# Patient Record
Sex: FEMALE | Race: BLACK OR AFRICAN AMERICAN | Employment: FULL TIME | URBAN - METROPOLITAN AREA
[De-identification: names, ages, dates, MRNs, and addresses within clinical notes are randomized per-mention and may not be internally consistent; named-entity substitution may affect disease eponyms.]

---

## 2017-10-03 ENCOUNTER — OFFICE VISIT (OUTPATIENT)
Dept: NEUROLOGY | Age: 42
End: 2017-10-03

## 2017-10-03 VITALS
HEIGHT: 60 IN | WEIGHT: 132 LBS | SYSTOLIC BLOOD PRESSURE: 122 MMHG | DIASTOLIC BLOOD PRESSURE: 84 MMHG | OXYGEN SATURATION: 98 % | HEART RATE: 76 BPM | BODY MASS INDEX: 25.91 KG/M2

## 2017-10-03 DIAGNOSIS — R20.0 LEFT LEG NUMBNESS: ICD-10-CM

## 2017-10-03 DIAGNOSIS — G43.009 MIGRAINE WITHOUT AURA AND WITHOUT STATUS MIGRAINOSUS, NOT INTRACTABLE: ICD-10-CM

## 2017-10-03 DIAGNOSIS — I49.8 FLUTTERING HEART: ICD-10-CM

## 2017-10-03 DIAGNOSIS — R00.2 PALPITATIONS: ICD-10-CM

## 2017-10-03 DIAGNOSIS — R20.0 FACIAL NUMBNESS: ICD-10-CM

## 2017-10-03 DIAGNOSIS — G43.719 INTRACTABLE CHRONIC MIGRAINE WITHOUT AURA AND WITHOUT STATUS MIGRAINOSUS: Primary | ICD-10-CM

## 2017-10-03 RX ORDER — PROPRANOLOL HYDROCHLORIDE 60 MG/1
120 CAPSULE, EXTENDED RELEASE ORAL DAILY
Qty: 60 CAP | Refills: 11 | Status: SHIPPED | OUTPATIENT
Start: 2017-10-10 | End: 2018-10-03 | Stop reason: SDUPTHER

## 2017-10-03 NOTE — MR AVS SNAPSHOT
Visit Information Date & Time Provider Department Dept. Phone Encounter #  
 10/3/2017 11:40 AM Jarad Del Valle MD [facility] at [facility] [phone] [id number] Follow-up Instructions Return in about 1 year (around 10/3/2018). Upcoming Health Maintenance Date Due DTaP/Tdap/Td series (1 - Tdap) 3/5/1996 PAP AKA CERVICAL CYTOLOGY 3/5/1996 INFLUENZA AGE 9 TO ADULT 8/1/2017 Allergies as of 10/3/2017  Review Complete On: 10/3/2017 By: Jarad Del Valle MD  
 No Known Allergies Current Immunizations  Never Reviewed No immunizations on file. Not reviewed this visit You Were Diagnosed With   
  
 Codes Comments Intractable chronic migraine without aura and without status migrainosus    -  Primary ICD-10-CM: H77.898 ICD-9-CM: 346.71 Fluttering heart     ICD-10-CM: I49.8 ICD-9-CM: 427.42 Palpitations     ICD-10-CM: R00.2 ICD-9-CM: 785.1 Migraine without aura and without status migrainosus, not intractable     ICD-10-CM: X16.382 ICD-9-CM: 346.10 Facial numbness     ICD-10-CM: R20.0 ICD-9-CM: 782.0 Left leg numbness     ICD-10-CM: R20.0 ICD-9-CM: 101. 0 Vitals BP Pulse Height(growth percentile) Weight(growth percentile) SpO2 BMI  
 122/84 76 5' (1.524 m) 132 lb (59.9 kg) 98% 25.78 kg/m2 OB Status Smoking Status Having regular periods Never Smoker Vitals History BMI and BSA Data Body Mass Index Body Surface Area 25.78 kg/m 2 1.59 m 2 Preferred Pharmacy Pharmacy Name Phone [facility] [address] [place] [address] [phone] Your Updated Medication List  
  
   
This list is accurate as of: 10/3/17 12:59 PM.  Always use your most recent med list.  
  
  
  
  
 norgestrel-ethinyl estradiol 0.3-30 mg-mcg Tab Commonly known as:  LO/OVRAL Take 1 Tab by mouth daily. propranolol LA 60 mg SR capsule Commonly known as:  INDERAL LA Take 2 Caps by mouth daily. Start taking on:  10/10/2017 Prescriptions Sent to Pharmacy Refills  
 propranolol LA (INDERAL LA) 60 mg SR capsule 11 Starting on: 10/10/2017 Sig: Take 2 Caps by mouth daily. Class: Normal  
 Pharmacy: [address] #: [phone] Route: Oral  
  
Follow-up Instructions Return in about 1 year (around 10/3/2018). Patient Instructions PRESCRIPTION REFILL POLICY [facility] Statement to Patients April 1, 2014 In an effort to ensure the large volume of patient prescription refills is processed in the most efficient and expeditious manner, we are asking our patients to assist us by calling your Pharmacy for all prescription refills, this will include also your  Mail Order Pharmacy. The pharmacy will contact our office electronically to continue the refill process. Please do not wait until the last minute to call your pharmacy. We need at least 48 hours (2days) to fill prescriptions. We also encourage you to call your pharmacy before going to  your prescription to make sure it is ready. With regard to controlled substance prescription refill requests (narcotic refills) that need to be picked up at our office, we ask your cooperation by providing us with at least 72 hours (3days) notice that you will need a refill. We will not refill narcotic prescription refill requests after 4:00pm on any weekday, Monday through Thursday, or after 2:00pm on Fridays, or on the weekends. We encourage everyone to explore another way of getting your prescription refill request processed using orderTopia, our patient web portal through our electronic medical record system.  Riskifiedt is an efficient and effective way to communicate your medication request directly to the office and downloadable as an henry on your smart phone . Evri also features a review functionality that allows you to view your medication list as well as leave messages for your physician. Are you ready to get connected? If so please review the attatched instructions or speak to any of our staff to get you set up right away! Thank you so much for your cooperation. Should you have any questions please contact our Practice Administrator. The Physicians and Staff,  [facility] A Healthy Lifestyle: Care Instructions Your Care Instructions A healthy lifestyle can help you feel good, stay at a healthy weight, and have plenty of energy for both work and play. A healthy lifestyle is something you can share with your whole family. A healthy lifestyle also can lower your risk for serious health problems, such as high blood pressure, heart disease, and diabetes. You can follow a few steps listed below to improve your health and the health of your family. Follow-up care is a key part of your treatment and safety. Be sure to make and go to all appointments, and call your doctor if you are having problems. Its also a good idea to know your test results and keep a list of the medicines you take. How can you care for yourself at home? · Do not eat too much sugar, fat, or fast foods. You can still have dessert and treats now and then. The goal is moderation. · Start small to improve your eating habits. Pay attention to portion sizes, drink less juice and soda pop, and eat more fruits and vegetables. ¨ Eat a healthy amount of food. A 3-ounce serving of meat, for example, is about the size of a deck of cards. Fill the rest of your plate with vegetables and whole grains. ¨ Limit the amount of soda and sports drinks you have every day. Drink more water when you are thirsty. ¨ Eat at least 5 servings of fruits and vegetables every day.  It may seem like a lot, but it is not hard to reach this goal. A serving or helping is 1 piece of fruit, 1 cup of vegetables, or 2 cups of leafy, raw vegetables. Have an apple or some carrot sticks as an afternoon snack instead of a candy bar. Try to have fruits and/or vegetables at every meal. 
· Make exercise part of your daily routine. You may want to start with simple activities, such as walking, bicycling, or slow swimming. Try to be active 30 to 60 minutes every day. You do not need to do all 30 to 60 minutes all at once. For example, you can exercise 3 times a day for 10 or 20 minutes. Moderate exercise is safe for most people, but it is always a good idea to talk to your doctor before starting an exercise program. 
· Keep moving. Nikolay Jimthers the lawn, work in the garden, or [employer]. Take the stairs instead of the elevator at work. · If you smoke, quit. People who smoke have an increased risk for heart attack, stroke, cancer, and other lung illnesses. Quitting is hard, but there are ways to boost your chance of quitting tobacco for good. ¨ Use nicotine gum, patches, or lozenges. ¨ Ask your doctor about stop-smoking programs and medicines. ¨ Keep trying. In addition to reducing your risk of diseases in the future, you will notice some benefits soon after you stop using tobacco. If you have shortness of breath or asthma symptoms, they will likely get better within a few weeks after you quit. · Limit how much alcohol you drink. Moderate amounts of alcohol (up to 2 drinks a day for men, 1 drink a day for women) are okay. But drinking too much can lead to liver problems, high blood pressure, and other health problems. Family health If you have a family, there are many things you can do together to improve your health. · Eat meals together as a family as often as possible. · Eat healthy foods. This includes fruits, vegetables, lean meats and dairy, and whole grains. · Include your family in your fitness plan. Most people think of activities such as jogging or tennis as the way to fitness, but there are many ways you and your family can be more active. Anything that makes you breathe hard and gets your heart pumping is exercise. Here are some tips: 
¨ Walk to do errands or to take your child to school or the bus. ¨ Go for a family bike ride after dinner instead of watching TV. Where can you learn more? Go to http://keily-tyson.info/. Enter G932 in the search box to learn more about \"A Healthy Lifestyle: Care Instructions. \" Current as of: July 26, 2016 Content Version: 11.3 © 5852-0048 [employer]. Care instructions adapted under license by [employer]Home (which disclaims liability or warranty for this information). If you have questions about a medical condition or this instruction, always ask your healthcare professional. [address] any warranty or liability for your use of this information. Introducing [facility] & HEALTH SERVICES! Araceli Jesus introduces WinBuyer patient portal. Now you can access parts of your medical record, email your doctor's office, and request medication refills online. 1. In your internet browser, go to [email or web address]/Osprey Spill Control 2. Click on the First Time User? Click Here link in the Sign In box. You will see the New Member Sign Up page. 3. Enter your WinBuyer Access Code exactly as it appears below. You will not need to use this code after youve completed the sign-up process. If you do not sign up before the expiration date, you must request a new code. · WinBuyer Access Code: [email or web address] Expires: 12/6/2017  9:47 AM 
 
4. Enter the last four digits of your Social Security Number (xxxx) and Date of Birth (mm/dd/yyyy) as indicated and click Submit. You will be taken to the next sign-up page. 5. Create a SafeBoot ID. This will be your SafeBoot login ID and cannot be changed, so think of one that is secure and easy to remember. 6. Create a SafeBoot password. You can change your password at any time. 7. Enter your Password Reset Question and Answer. This can be used at a later time if you forget your password. 8. Enter your e-mail address. You will receive e-mail notification when new information is available in [address] 9. Click Sign Up. You can now view and download portions of your medical record. 10. Click the Download Summary menu link to download a portable copy of your medical information. If you have questions, please visit the Frequently Asked Questions section of the SafeBoot website. Remember, SafeBoot is NOT to be used for urgent needs. For medical emergencies, dial 911. Now available from your iPhone and Android! Please provide this summary of care documentation to your next provider. Your primary care clinician is listed as POLLO Tavarez. If you have any questions after today's visit, please call [phone].

## 2017-10-03 NOTE — PATIENT INSTRUCTIONS
[facility]   Statement to Patients  April 1, 2014      In an effort to ensure the large volume of patient prescription refills is processed in the most efficient and expeditious manner, we are asking our patients to assist us by calling your Pharmacy for all prescription refills, this will include also your  Mail Order Pharmacy. The pharmacy will contact our office electronically to continue the refill process. Please do not wait until the last minute to call your pharmacy. We need at least 48 hours (2days) to fill prescriptions. We also encourage you to call your pharmacy before going to  your prescription to make sure it is ready. With regard to controlled substance prescription refill requests (narcotic refills) that need to be picked up at our office, we ask your cooperation by providing us with at least 72 hours (3days) notice that you will need a refill. We will not refill narcotic prescription refill requests after 4:00pm on any weekday, Monday through Thursday, or after 2:00pm on Fridays, or on the weekends. We encourage everyone to explore another way of getting your prescription refill request processed using [employer], our patient web portal through our electronic medical record system. [employer] is an efficient and effective way to communicate your medication request directly to the office and  downloadable as an henry on your smart phone . [employer] also features a review functionality that allows you to view your medication list as well as leave messages for your physician. Are you ready to get connected? If so please review the attatched instructions or speak to any of our staff to get you set up right away! Thank you so much for your cooperation. Should you have any questions please contact our Practice Administrator.     The Physicians and Staff,  [facility]          A Healthy Lifestyle: Care Instructions  Your Care Instructions  A healthy lifestyle can help you feel good, stay at a healthy weight, and have plenty of energy for both work and play. A healthy lifestyle is something you can share with your whole family. A healthy lifestyle also can lower your risk for serious health problems, such as high blood pressure, heart disease, and diabetes. You can follow a few steps listed below to improve your health and the health of your family. Follow-up care is a key part of your treatment and safety. Be sure to make and go to all appointments, and call your doctor if you are having problems. Its also a good idea to know your test results and keep a list of the medicines you take. How can you care for yourself at home? · Do not eat too much sugar, fat, or fast foods. You can still have dessert and treats now and then. The goal is moderation. · Start small to improve your eating habits. Pay attention to portion sizes, drink less juice and soda pop, and eat more fruits and vegetables. ¨ Eat a healthy amount of food. A 3-ounce serving of meat, for example, is about the size of a deck of cards. Fill the rest of your plate with vegetables and whole grains. ¨ Limit the amount of soda and sports drinks you have every day. Drink more water when you are thirsty. ¨ Eat at least 5 servings of fruits and vegetables every day. It may seem like a lot, but it is not hard to reach this goal. A serving or helping is 1 piece of fruit, 1 cup of vegetables, or 2 cups of leafy, raw vegetables. Have an apple or some carrot sticks as an afternoon snack instead of a candy bar. Try to have fruits and/or vegetables at every meal.  · Make exercise part of your daily routine. You may want to start with simple activities, such as walking, bicycling, or slow swimming. Try to be active 30 to 60 minutes every day. You do not need to do all 30 to 60 minutes all at once. For example, you can exercise 3 times a day for 10 or 20 minutes.  Moderate exercise is safe for most people, but it is always a good idea to talk to your doctor before starting an exercise program.  · Keep moving. Pinky Boys the lawn, work in the garden, or "[employer]. Take the stairs instead of the elevator at work. · If you smoke, quit. People who smoke have an increased risk for heart attack, stroke, cancer, and other lung illnesses. Quitting is hard, but there are ways to boost your chance of quitting tobacco for good. ¨ Use nicotine gum, patches, or lozenges. ¨ Ask your doctor about stop-smoking programs and medicines. ¨ Keep trying. In addition to reducing your risk of diseases in the future, you will notice some benefits soon after you stop using tobacco. If you have shortness of breath or asthma symptoms, they will likely get better within a few weeks after you quit. · Limit how much alcohol you drink. Moderate amounts of alcohol (up to 2 drinks a day for men, 1 drink a day for women) are okay. But drinking too much can lead to liver problems, high blood pressure, and other health problems. Family health  If you have a family, there are many things you can do together to improve your health. · Eat meals together as a family as often as possible. · Eat healthy foods. This includes fruits, vegetables, lean meats and dairy, and whole grains. · Include your family in your fitness plan. Most people think of activities such as jogging or tennis as the way to fitness, but there are many ways you and your family can be more active. Anything that makes you breathe hard and gets your heart pumping is exercise. Here are some tips:  ¨ Walk to do errands or to take your child to school or the bus. ¨ Go for a family bike ride after dinner instead of watching TV. Where can you learn more? Go to http://keily-tyson.info/. Enter J239 in the search box to learn more about \"A Healthy Lifestyle: Care Instructions. \"  Current as of: July 26, 2016  Content Version: 11.3  © 4902-4130 [employer]. Care instructions adapted under license by [employer] (which disclaims liability or warranty for this information). If you have questions about a medical condition or this instruction, always ask your healthcare professional. [address] any warranty or liability for your use of this information.

## 2017-10-03 NOTE — PROGRESS NOTES
Name: Ramone Feng    Chief Complaint: migraines    Mrs Alfred Oneal returns for a follow up visit. Since last being seen she  has been compliant with medications. No new health conditions have arisen. No new medications have been  prescribed. She  has no new symptoms to address today. Migraines are under good control. Assesment and Plan    1. Intractable chronic migraine without aura and without status migrainosus  proponalol    2. Fluttering heart  Advised to remain vigilant      Allergies  Review of patient's allergies indicates no known allergies. Medications  Current Outpatient Prescriptions   Medication Sig    propranolol LA (INDERAL LA) 60 mg SR capsule Take 2 Caps by mouth daily.  norgestrel-ethinyl estradiol (LO/OVRAL) 0.3-30 mg-mcg tab Take 1 Tab by mouth daily.  atenolol (TENORMIN) 50 mg tablet Take  by mouth daily. No current facility-administered medications for this visit. Medical History  Past Medical History:   Diagnosis Date    Headache      Review of Systems   Constitutional: Negative for chills and fever. HENT: Negative for ear pain. Eyes: Negative for pain and discharge. Respiratory: Negative for cough and hemoptysis. Cardiovascular: Negative for chest pain and claudication. Gastrointestinal: Negative for constipation and diarrhea. Genitourinary: Negative for flank pain and hematuria. Musculoskeletal: Negative for back pain and myalgias. Skin: Negative for itching and rash. Neurological: Negative for headaches. Endo/Heme/Allergies: Negative for environmental allergies. Does not bruise/bleed easily. Psychiatric/Behavioral: Negative for depression and hallucinations. Exam:    Visit Vitals    /84    Pulse 76    Ht 5' (1.524 m)    Wt 132 lb (59.9 kg)    SpO2 98%    BMI 25.78 kg/m2      General: Well developed, well nourished.  Patient in no apparent distress   Head: Normocephalic, atraumatic, anicteric sclera   Neck Normal ROM, No thyromegally   Lungs:  Clear to auscultation bilaterally, No wheezes or rubs   Cardiac: Regular rate and rhythm with no murmurs. Abd: Bowel sounds were audible. No tenderness on palpation   Ext: No pedal edema   Skin: Supple no rash     NeurologicExam:  Mental Status: Alert and oriented to person place and time   Speech: Fluent no aphasia or dysarthria. Cranial Nerves:  II - XII Inact   Motor:  Full and symmetric strength of upper and lower proximal and distal muscles. Normal bulk and tone. Reflexes:   Deep tendon reflexes 2+/4 and symmetric. Sensory:   Symmetric and intact with no perceived deficits modalities involving small or large fibers. Gait:  Gait is balanced and fluid with normal arm swing. Tremor:   No tremor noted. Cerebellar:  Coordination intact. Neurovascular: No carotid bruits.  No JVD

## 2018-02-14 ENCOUNTER — TELEPHONE (OUTPATIENT)
Dept: SURGERY | Age: 43
End: 2018-02-14

## 2018-02-14 ENCOUNTER — DOCUMENTATION ONLY (OUTPATIENT)
Dept: SURGERY | Age: 43
End: 2018-02-14

## 2018-02-14 ENCOUNTER — OFFICE VISIT (OUTPATIENT)
Dept: SURGERY | Age: 43
End: 2018-02-14

## 2018-02-14 VITALS
BODY MASS INDEX: 23.56 KG/M2 | SYSTOLIC BLOOD PRESSURE: 140 MMHG | HEIGHT: 60 IN | WEIGHT: 120 LBS | HEART RATE: 84 BPM | DIASTOLIC BLOOD PRESSURE: 77 MMHG

## 2018-02-14 DIAGNOSIS — Z17.0 MALIGNANT NEOPLASM OF UPPER-OUTER QUADRANT OF RIGHT BREAST IN FEMALE, ESTROGEN RECEPTOR POSITIVE (HCC): Primary | ICD-10-CM

## 2018-02-14 DIAGNOSIS — C50.411 MALIGNANT NEOPLASM OF UPPER-OUTER QUADRANT OF RIGHT BREAST IN FEMALE, ESTROGEN RECEPTOR POSITIVE (HCC): Primary | ICD-10-CM

## 2018-02-14 NOTE — PROGRESS NOTES
HISTORY OF PRESENT ILLNESS  America Zambrano is a 43 y.o. female. HPI NEW Patient presents for consultation at the request of Dr. Joslyn Plaza for newly diagnosed RIGHT breast cancer. The patient was able to feel a lump which led to diagnostic mammogram, ultrasound, and biopsy. This was the first time she had a breast biopsy done. Has minimal pain at biopsy site. FH: Maternal cousin diagnosed with breast cancer in her 66's. Screening mammogram in 11/2017: BI-RADS 1. Diagnostic pablo and US done 2/1/18 at [facility]: BI-RADS 4B. 1.2 cm hypoechoic mass 10:00 upper outer quadrant. No abnormal nodes. RIGHT breast biopsy done 2/6/18. Pathology revealed RIGHT breast IDC, grade 2, ER positive 80%, MD positive 5%, HER 2 equivocal on ihc    Past Medical History:   Diagnosis Date    Gestational diabetes     Headache     Palpitations     Prediabetes      Past Surgical History:   Procedure Laterality Date    HX GYN       Social History     Social History    Marital status:      Spouse name: N/A    Number of children: N/A    Years of education: N/A     Occupational History    Not on file. Social History Main Topics    Smoking status: Never Smoker    Smokeless tobacco: Never Used    Alcohol use No    Drug use: No    Sexual activity: Not on file     Other Topics Concern    Not on file     Social History Narrative     OB History     Obstetric Comments    Menarche:  15. LMP: 2/8/18. # of Children:  1. Age at Delivery of First Child:  25.   Hysterectomy/oophorectomy:  NO/NO. Breast Bx:  yes. Hx of Breast Feeding:  no. BCP:  yes. Hormone therapy:  no.               Current Outpatient Prescriptions:     propranolol LA (INDERAL LA) 60 mg SR capsule, Take 2 Caps by mouth daily. , Disp: 60 Cap, Rfl: 11    norgestrel-ethinyl estradiol (LO/OVRAL) 0.3-30 mg-mcg tab, Take 1 Tab by mouth daily. , Disp: , Rfl:   No Known Allergies      Review of Systems   Constitutional: Negative.          Heavy periods  Frequent yeast infection  Bleeding between periods  Irregular periods  Breast pain with periods   HENT: Negative. Eyes: Negative. Respiratory: Negative. Cardiovascular: Positive for palpitations. Gastrointestinal: Negative. Genitourinary: Negative. Skin: Negative. Neurological: Negative. Endo/Heme/Allergies: Negative. Psychiatric/Behavioral: Negative. Physical Exam   Constitutional: She is oriented to person, place, and time. She appears well-developed and well-nourished. HENT:   Head: Normocephalic. Eyes: EOM are normal.   Neck: Neck supple. No thyromegaly present. Cardiovascular: Normal rate, regular rhythm, normal heart sounds and intact distal pulses. Pulmonary/Chest: Effort normal and breath sounds normal. Right breast exhibits mass. Right breast exhibits no nipple discharge, no skin change and no tenderness. Inverted nipple: 1.2 cm mass tail right breast. Left breast exhibits no inverted nipple, no mass, no nipple discharge, no skin change and no tenderness. Breasts are symmetrical.       Abdominal: Soft. Musculoskeletal: Normal range of motion. Lymphadenopathy:     She has no cervical adenopathy. She has no axillary adenopathy. Neurological: She is alert and oriented to person, place, and time. Skin: Skin is warm and dry. Psychiatric: She has a normal mood and affect. Nursing note and vitals reviewed. ASSESSMENT and PLAN    ICD-10-CM ICD-9-CM    1. Malignant neoplasm of upper-outer quadrant of right breast in female, estrogen receptor positive [facility] C50.411 174.4     Z17.0 V86.0      42 yo female here today for second opinion. She has a T1 N0 IDC Grade 2 Er/Pr+ Her 2 foreign eq right breast cancer. She is here with her . I have reviewed the imaging and pathology with her and she was given copies of these reports.     90 minutes were spent face-to-face with the patient during this encounter and 90% of that time was spent on counseling and coordination of care. 1. Discussed lumpectomy and radiation vs mastectomy. Discussed reconstruction. MRI ordered to see if patient is a candidate for a lumpectomy. The was ordered already at [address] [place] [address]. 2. Discussed sentinel lymph node biopsy. 3. Discussed external beam radiation. 4. Discussed hormone therapy. 5. Discussed the possibility of chemotherapy. Her 2 foreign fish pending. 6. Discussed genetic testing. Had gene panel sent. She will let me know who she wants to do her breast surgery. Gene panel , mri pending. She has appt with Dr. Manjinder Baez to discuss reconstruction since the patient wants bilateral mastectomies. I think this is reasonable. She will let us know when mri is scheduled so we can f/u on that. She was happy with our discussion.

## 2018-02-14 NOTE — PATIENT INSTRUCTIONS
Breast Cancer: Care Instructions  Your Care Instructions    Breast cancer occurs when abnormal cells grow out of control in the breast. These cancer cells can spread within the breast, to nearby lymph nodes and other tissues, and to other parts of the body. Being treated for cancer can weaken your body, and you may feel very tired. Get the rest your body needs so you can feel better. Finding out that you have cancer is scary. You may feel many emotions and may need some help coping. Seek out family, friends, and counselors for support. You also can do things at home to make yourself feel better while you go through treatment. Call the [employer] (9-775.163.8517) or visit its website at "[employer] for more information. Follow-up care is a key part of your treatment and safety. Be sure to make and go to all appointments, and call your doctor if you are having problems. It's also a good idea to know your test results and keep a list of the medicines you take. How can you care for yourself at home? · Take your medicines exactly as prescribed. Call your doctor if you think you are having a problem with your medicine. You may get medicine for nausea and vomiting if you have these side effects. · Follow your doctor's instructions to relieve pain. Pain from cancer and surgery can almost always be controlled. Use pain medicine when you first notice pain, before it becomes severe. · Eat healthy food. If you do not feel like eating, try to eat food that has protein and extra calories to keep up your strength and prevent weight loss. Drink liquid meal replacements for extra calories and protein. Try to eat your main meal early. · Get some physical activity every day, but do not get too tired. Keep doing the hobbies you enjoy as your energy allows. · Do not smoke. Smoking can make your cancer worse. If you need help quitting, talk to your doctor about stop-smoking programs and medicines.  These can increase your chances of quitting for good. · Take steps to control your stress and workload. Learn relaxation techniques. ¨ Share your feelings. Stress and tension affect our emotions. By expressing your feelings to others, you may be able to understand and cope with them. ¨ Consider joining a support group. Talking about a problem with your spouse, a good friend, or other people with similar problems is a good way to reduce tension and stress. ¨ Express yourself through art. Try writing, crafts, dance, or art to relieve stress. Some dance, writing, or art groups may be available just for people who have cancer. ¨ Be kind to your body and mind. Getting enough sleep, eating a healthy diet, and taking time to do things you enjoy can contribute to an overall feeling of balance in your life and can help reduce stress. ¨ Get help if you need it. Discuss your concerns with your doctor or counselor. · If you are vomiting or have diarrhea:  ¨ Drink plenty of fluids (enough so that your urine is light yellow or clear like water) to prevent dehydration. Choose water and other caffeine-free clear liquids. If you have kidney, heart, or liver disease and have to limit fluids, talk with your doctor before you increase the amount of fluids you drink. ¨ When you are able to eat, try clear soups, mild foods, and liquids until all symptoms are gone for 12 to 48 hours. Other good choices include dry toast, crackers, cooked cereal, and gelatin dessert, such as Jell-O.  · If you have not already done so, prepare a list of advance directives. Advance directives are instructions to your doctor and family members about what kind of care you want if you become unable to speak or express yourself. When should you call for help? Call 911 anytime you think you may need emergency care. For example, call if:  ? · You passed out (lost consciousness). ?Call your doctor now or seek immediate medical care if:  ? · You have a fever. ? · You have abnormal bleeding. ? · You think you have an infection. ? · You have new or worse pain. ? · You have new symptoms, such as a cough, belly pain, vomiting, diarrhea, or a rash. ? Watch closely for changes in your health, and be sure to contact your doctor if:  ? · You are much more tired than usual.   ? · You have swollen glands in your armpits, groin, or neck. ? · You do not get better as expected. Where can you learn more? Go to http://keily-tyson.info/. Enter V321 in the search box to learn more about \"Breast Cancer: Care Instructions. \"  Current as of: May 12, 2017  Content Version: 11.4  © 6243-6774 [employer]. Care instructions adapted under license by [employer] (which disclaims liability or warranty for this information). If you have questions about a medical condition or this instruction, always ask your healthcare professional. [address] any warranty or liability for your use of this information.

## 2018-02-14 NOTE — LETTER
2/14/2018 1:09 PM 
 
Patient: Kary Curran YOB: 1975 Date of Visit: 2/14/2018 Dear Rashad Littlejohn MD 
[facility] [address] [id number] [facility] [phone] [id number] VIA In Basket 
 : Thank you for referring Ms. Tatiana Fay to me for evaluation/treatment. Below are the relevant portions of my assessment and plan of care. HISTORY OF PRESENT ILLNESS Kary Curran is a 43 y.o. female. HPI NEW Patient presents for consultation at the request of Dr. Acosta Prarish for newly diagnosed RIGHT breast cancer. The patient was able to feel a lump which led to diagnostic mammogram, ultrasound, and biopsy. This was the first time she had a breast biopsy done. Has minimal pain at biopsy site. FH: Maternal cousin diagnosed with breast cancer in her 66's. Screening mammogram in 11/2017: BI-RADS 1. Diagnostic pablo and US done 2/1/18 at [facility]: BI-RADS 4B. 1.2 cm hypoechoic mass 10:00 upper outer quadrant. No abnormal nodes. RIGHT breast biopsy done 2/6/18. Pathology revealed RIGHT breast IDC, grade 2, ER positive 80%, CO positive 5%, HER 2 equivocal on ihc 
 
Past Medical History:  
Diagnosis Date  Gestational diabetes  Headache  Palpitations  Prediabetes Past Surgical History:  
Procedure Laterality Date  HX GYN Social History Social History  Marital status:  Spouse name: N/A  
 Number of children: N/A  
 Years of education: N/A Occupational History  Not on file. Social History Main Topics  Smoking status: Never Smoker  Smokeless tobacco: Never Used  Alcohol use No  
 Drug use: No  
 Sexual activity: Not on file Other Topics Concern  Not on file Social History Narrative OB History Obstetric Comments Menarche:  15. LMP: 2/8/18. # of Children:  1. Age at Delivery of First Child:  25.   Hysterectomy/oophorectomy:  NO/NO. Breast Bx:  yes. Hx of Breast Feeding:  no. BCP:  yes. Hormone therapy:  no.  
 
  
  
 
 
Current Outpatient Prescriptions:  
  propranolol LA (INDERAL LA) 60 mg SR capsule, Take 2 Caps by mouth daily. , Disp: 60 Cap, Rfl: 11 
  norgestrel-ethinyl estradiol (LO/OVRAL) 0.3-30 mg-mcg tab, Take 1 Tab by mouth daily. , Disp: , Rfl:  
No Known Allergies Review of Systems Constitutional: Negative. Heavy periods Frequent yeast infection Bleeding between periods Irregular periods Breast pain with periods HENT: Negative. Eyes: Negative. Respiratory: Negative. Cardiovascular: Positive for palpitations. Gastrointestinal: Negative. Genitourinary: Negative. Skin: Negative. Neurological: Negative. Endo/Heme/Allergies: Negative. Psychiatric/Behavioral: Negative. Physical Exam  
Constitutional: She is oriented to person, place, and time. She appears well-developed and well-nourished. HENT:  
Head: Normocephalic. Eyes: EOM are normal.  
Neck: Neck supple. No thyromegaly present. Cardiovascular: Normal rate, regular rhythm, normal heart sounds and intact distal pulses. Pulmonary/Chest: Effort normal and breath sounds normal. Right breast exhibits mass. Right breast exhibits no nipple discharge, no skin change and no tenderness. Inverted nipple: 1.2 cm mass tail right breast. Left breast exhibits no inverted nipple, no mass, no nipple discharge, no skin change and no tenderness. Breasts are symmetrical.  
 
 
Abdominal: Soft. Musculoskeletal: Normal range of motion. Lymphadenopathy:  
  She has no cervical adenopathy. She has no axillary adenopathy. Neurological: She is alert and oriented to person, place, and time. Skin: Skin is warm and dry. Psychiatric: She has a normal mood and affect. Nursing note and vitals reviewed. ASSESSMENT and PLAN 
  ICD-10-CM ICD-9-CM 1. Malignant neoplasm of upper-outer quadrant of right breast in female, estrogen receptor positive (HCC) C50.411 174.4   
 Z17.0 V86.0   
 
44 yo female here today for second opinion. She has a T1 N0 IDC Grade 2 Er/Pr+ Her 2 foreign eq right breast cancer. She is here with her . I have reviewed the imaging and pathology with her and she was given copies of these reports. 90 minutes were spent face-to-face with the patient during this encounter and 90% of that time was spent on counseling and coordination of care. 1. Discussed lumpectomy and radiation vs mastectomy. Discussed reconstruction. MRI ordered to see if patient is a candidate for a lumpectomy. The was ordered already at [address] [place] [address]. 2. Discussed sentinel lymph node biopsy. 3. Discussed external beam radiation. 4. Discussed hormone therapy. 5. Discussed the possibility of chemotherapy. Her 2 foreign fish pending. 6. Discussed genetic testing. Had gene panel sent. She will let me know who she wants to do her breast surgery. Gene panel , mri pending. She has appt with Dr. Raquel Borja to discuss reconstruction since the patient wants bilateral mastectomies. I think this is reasonable. She will let us know when mri is scheduled so we can f/u on that. She was happy with our discussion. If you have questions, please do not hesitate to call me. I look forward to following Ms. Leona Best along with you. Sincerely, Cookie Morris MD

## 2018-02-14 NOTE — PROGRESS NOTES
Type of Film: [x] CD [] FILMS  Type of Test: [] MRI [x] MAMMO  From: [facility]  Given to: Dr. Aida Renner will bring CDs to [facility].   LOCATION  To be Downloaded into PACS:  YES

## 2018-02-14 NOTE — COMMUNICATION BODY
HISTORY OF PRESENT ILLNESS  America Lehman is a 43 y.o. female. HPI NEW Patient presents for consultation at the request of Dr. Glo Tsai for newly diagnosed RIGHT breast cancer. The patient was able to feel a lump which led to diagnostic mammogram, ultrasound, and biopsy. This was the first time she had a breast biopsy done. Has minimal pain at biopsy site. FH: Maternal cousin diagnosed with breast cancer in her 66's. Screening mammogram in 11/2017: BI-RADS 1. Diagnostic pablo and US done 2/1/18 at [address]: BI-RADS 4B. 1.2 cm hypoechoic mass 10:00 upper outer quadrant. No abnormal nodes. RIGHT breast biopsy done 2/6/18. Pathology revealed RIGHT breast IDC, grade 2, ER positive 80%, MI positive 5%, HER 2 equivocal on ihc    Past Medical History:   Diagnosis Date    Gestational diabetes     Headache     Palpitations     Prediabetes      Past Surgical History:   Procedure Laterality Date    HX GYN       Social History     Social History    Marital status:      Spouse name: N/A    Number of children: N/A    Years of education: N/A     Occupational History    Not on file. Social History Main Topics    Smoking status: Never Smoker    Smokeless tobacco: Never Used    Alcohol use No    Drug use: No    Sexual activity: Not on file     Other Topics Concern    Not on file     Social History Narrative     OB History     Obstetric Comments    Menarche:  15. LMP: 2/8/18. # of Children:  1. Age at Delivery of First Child:  25.   Hysterectomy/oophorectomy:  NO/NO. Breast Bx:  yes. Hx of Breast Feeding:  no. BCP:  yes. Hormone therapy:  no.               Current Outpatient Prescriptions:     propranolol LA (INDERAL LA) 60 mg SR capsule, Take 2 Caps by mouth daily. , Disp: 60 Cap, Rfl: 11    norgestrel-ethinyl estradiol (LO/OVRAL) 0.3-30 mg-mcg tab, Take 1 Tab by mouth daily. , Disp: , Rfl:   No Known Allergies      Review of Systems   Constitutional: Negative.          Heavy periods  Frequent yeast infection  Bleeding between periods  Irregular periods  Breast pain with periods   HENT: Negative. Eyes: Negative. Respiratory: Negative. Cardiovascular: Positive for palpitations. Gastrointestinal: Negative. Genitourinary: Negative. Skin: Negative. Neurological: Negative. Endo/Heme/Allergies: Negative. Psychiatric/Behavioral: Negative. Physical Exam   Constitutional: She is oriented to person, place, and time. She appears well-developed and well-nourished. HENT:   Head: Normocephalic. Eyes: EOM are normal.   Neck: Neck supple. No thyromegaly present. Cardiovascular: Normal rate, regular rhythm, normal heart sounds and intact distal pulses. Pulmonary/Chest: Effort normal and breath sounds normal. Right breast exhibits mass. Right breast exhibits no nipple discharge, no skin change and no tenderness. Inverted nipple: 1.2 cm mass tail right breast. Left breast exhibits no inverted nipple, no mass, no nipple discharge, no skin change and no tenderness. Breasts are symmetrical.       Abdominal: Soft. Musculoskeletal: Normal range of motion. Lymphadenopathy:     She has no cervical adenopathy. She has no axillary adenopathy. Neurological: She is alert and oriented to person, place, and time. Skin: Skin is warm and dry. Psychiatric: She has a normal mood and affect. Nursing note and vitals reviewed. ASSESSMENT and PLAN    ICD-10-CM ICD-9-CM    1. Malignant neoplasm of upper-outer quadrant of right breast in female, estrogen receptor positive [facility] C50.411 174.4     Z17.0 V86.0      42 yo female here today for second opinion. She has a T1 N0 IDC Grade 2 Er/Pr+ Her 2 foreign eq right breast cancer. She is here with her . I have reviewed the imaging and pathology with her and she was given copies of these reports.     90 minutes were spent face-to-face with the patient during this encounter and 90% of that time was spent on counseling and coordination of care. 1. Discussed lumpectomy and radiation vs mastectomy. Discussed reconstruction. MRI ordered to see if patient is a candidate for a lumpectomy. The was ordered already at [address] [place] [address]. 2. Discussed sentinel lymph node biopsy. 3. Discussed external beam radiation. 4. Discussed hormone therapy. 5. Discussed the possibility of chemotherapy. Her 2 foregin fish pending. 6. Discussed genetic testing. Had gene panel sent. She will let me know who she wants to do her breast surgery. Gene panel , mri pending. She has appt with Dr. Olga Groves to discuss reconstruction since the patient wants bilateral mastectomies. I think this is reasonable. She will let us know when mri is scheduled so we can f/u on that. She was happy with our discussion.

## 2018-02-14 NOTE — TELEPHONE ENCOUNTER
Patient wants to know fax # for fmla ppw. I called back and provided fax # [phone] Attn: Ej Romero.

## 2018-02-16 ENCOUNTER — TELEPHONE (OUTPATIENT)
Dept: SURGERY | Age: 43
End: 2018-02-16

## 2018-02-16 NOTE — TELEPHONE ENCOUNTER
Called patient after she left a message asking Amanda Gardner to call her. Gave her the fax # for [facility] so she can send her her LA ppw. She would like Hoskins to call her back and let her know she received the ppw. She wanted to let Dr. Palak Chamberlain and Amanda Gardner know when she is having her MRI and appointment with Dr. Dee Corbett. The MRI is at [facility] office on 2/26/18 at 7:30 am. Her appointment with Dr. Dee Corbett is the same day 2/26/18, at 10am.      She was appreciative of return phone call.

## 2018-02-19 ENCOUNTER — DOCUMENTATION ONLY (OUTPATIENT)
Dept: SURGERY | Age: 43
End: 2018-02-19

## 2018-02-19 NOTE — PROGRESS NOTES
[facility] have been uploaded into pacs. These cds will be placed into the shredder.

## 2018-02-26 DIAGNOSIS — C50.411 MALIGNANT NEOPLASM OF UPPER-OUTER QUADRANT OF RIGHT BREAST IN FEMALE, ESTROGEN RECEPTOR POSITIVE (HCC): Primary | ICD-10-CM

## 2018-02-26 DIAGNOSIS — Z17.0 MALIGNANT NEOPLASM OF UPPER-OUTER QUADRANT OF RIGHT BREAST IN FEMALE, ESTROGEN RECEPTOR POSITIVE (HCC): Primary | ICD-10-CM

## 2018-02-27 ENCOUNTER — DOCUMENTATION ONLY (OUTPATIENT)
Dept: SURGERY | Age: 43
End: 2018-02-27

## 2018-02-27 ENCOUNTER — TELEPHONE (OUTPATIENT)
Dept: SURGERY | Age: 43
End: 2018-02-27

## 2018-02-27 NOTE — TELEPHONE ENCOUNTER
Patient called wanting to know update and plan. I called patient back. She had breast MRI yesterday (2/26/18) at [facility]. I called [facility] and report has not been signed out yet. They requested films/cd from outside imaging for comparison and states this usually take a week to complete. She saw Dr. Aissatou Sorensen yesterday (2/26/18) and they discussed having bilateral mastectomy with implants. She states she wants to have surgery with Dr. Hubert Ramirez and Dr. Aissatou Sorensen. She does not want to go with Dr. Becky Goodson. She has not heard any update from Dr. Daryle Brunet office in regards to genetic testing results. I told her I would route this to Dr. Hubert Ramirez and I would let her know she was asking for an update. Answered many questions to the best of my ability.

## 2018-02-27 NOTE — TELEPHONE ENCOUNTER
Patient called and left a message asking for Samanta Stallworth and any updates. I called patient to see how I could help but there was no answer. I left a message asking her to call us back.

## 2018-02-27 NOTE — PROGRESS NOTES
Patient's  left voicemail for me. I called patient back and talked with her. I saw Joe Foote has also talked with her today. Patient would love to get a phone call from Dr. Phoebe García or nurse to go over a few things with her so she can get \"things straight in her head\". She feels Dr. Gary Sheridan office has worked too quickly and is worried they will be upset when she calls to tell them she is going with another doctor. She has questions regarding chemo, will she get it before surgery, after surgery, who will it be with? What happens about genetic testing cause she already had it done, what is the next step? She became very tearful as she spoke about her daughter coming home from school for Spring Break and needs to sit down and tell her about her cancer. She wants to be able to tell daughter what is going to be happening and when to help ease any anxiety it may cause to her daughter. She wants Dr. Phoebe García doing her surgery along with Dr. Santa Haji. Etc. Patient can be reached at [phone]. I told her Dr. Phoebe García was in surgery today, but assured her she would get a call from somebody tomorrow, 2/28/17. Patient is anxious and needs reassurance. She thanked me profusely for letting her talk.

## 2018-02-28 ENCOUNTER — TELEPHONE (OUTPATIENT)
Dept: SURGERY | Age: 43
End: 2018-02-28

## 2018-02-28 NOTE — TELEPHONE ENCOUNTER
I called the patient in response to her calls to our office yesterday and I explained to her that Dr. Tawanda Bennett would like to see the results of her breast MRI before scheduling surgery. The breast MRI results are not back yet, in fact, the facility in which she had it done [facility]) had to request prior mammo films [facility]) which may take more time for a read. Patient met with Dr. Joseph Fair on Monday 2/26. The patient says that she is \"scheduled for surgery\" with Dr. Aide Josue on 3/15/18. When I asked her what kind of surgery, she just said \"breast surgery. \" When I asked further: lumpectomy or mastectomy? She said that she didn't know. I advised that she call Dr. Nancy Zuluaga office to find out and also cancel the surgery if she wants to go with Dr. Tawanda Bennett. Patient also waiting to hear back from Dr. Nancy Zuluaga office about results of genetic test. She has not heard from them yet. I called [facility] and discovered that this result is still pending. I did get final result of HER 2 hormone result which is POSITIVE. I will have a copy of this scanned into chart and notify Dr. Tawanda Bennett.

## 2018-03-01 ENCOUNTER — TELEPHONE (OUTPATIENT)
Dept: SURGERY | Age: 43
End: 2018-03-01

## 2018-03-01 NOTE — TELEPHONE ENCOUNTER
I called the patient in response to a voicemail that I received from the patient's  this morning, asking for result of breast MRI. I called the patient to notify her that she needs to contact Dr. Bartolome Gomez office for this result. By the time I called her this afternoon, Dr. Claudette Rolon had already called the patient and told her that \"nothing else was seen besides the already known cancer. \" I will work on obtaining a copy of the breast MRI report. Additionally, the patient says that the result of her genetic test is back. I will obtain this from Dr. Bartolome Gomez office as well. First I need the signed release form from the patient who apparently refused to sign it when she was at our office and is going to fax us a copy this evening.

## 2018-03-02 ENCOUNTER — TELEPHONE (OUTPATIENT)
Dept: SURGERY | Age: 43
End: 2018-03-02

## 2018-03-02 NOTE — TELEPHONE ENCOUNTER
Received breast MRI report and Dr. Qamar Munoz reviewed this. She would like the patient to come into the office next week and view the actual images. I called the patient. Offered her next Wed. 3/7 at 4pm. Didn't answer. Requested that she call our office back to confirm this appointment date. Patient is aware that she needs to request breast mri CD and pick it up to bring with her to appointment with Dr. Qamar Munoz. Additionally, patient asked about vaginal yeast infection (since she stopped birth control pill). Per Dr. Qamar Munoz, patient is to ask her obgyn for recommendations about this. Note: Per [facility], genetic test results are still pending.

## 2018-03-07 ENCOUNTER — OFFICE VISIT (OUTPATIENT)
Dept: SURGERY | Age: 43
End: 2018-03-07

## 2018-03-07 ENCOUNTER — DOCUMENTATION ONLY (OUTPATIENT)
Dept: SURGERY | Age: 43
End: 2018-03-07

## 2018-03-07 VITALS
BODY MASS INDEX: 23.56 KG/M2 | WEIGHT: 120 LBS | HEIGHT: 60 IN | DIASTOLIC BLOOD PRESSURE: 77 MMHG | HEART RATE: 86 BPM | SYSTOLIC BLOOD PRESSURE: 135 MMHG

## 2018-03-07 DIAGNOSIS — C50.411 MALIGNANT NEOPLASM OF UPPER-OUTER QUADRANT OF RIGHT BREAST IN FEMALE, ESTROGEN RECEPTOR POSITIVE (HCC): Primary | ICD-10-CM

## 2018-03-07 DIAGNOSIS — Z17.0 MALIGNANT NEOPLASM OF UPPER-OUTER QUADRANT OF RIGHT BREAST IN FEMALE, ESTROGEN RECEPTOR POSITIVE (HCC): Primary | ICD-10-CM

## 2018-03-07 NOTE — PATIENT INSTRUCTIONS
Breast Cancer: Care Instructions  Your Care Instructions    Breast cancer occurs when abnormal cells grow out of control in the breast. These cancer cells can spread within the breast, to nearby lymph nodes and other tissues, and to other parts of the body. Being treated for cancer can weaken your body, and you may feel very tired. Get the rest your body needs so you can feel better. Finding out that you have cancer is scary. You may feel many emotions and may need some help coping. Seek out family, friends, and counselors for support. You also can do things at home to make yourself feel better while you go through treatment. Call the [facility] (2-947.725.4894) or visit its website at [employer] for more information. Follow-up care is a key part of your treatment and safety. Be sure to make and go to all appointments, and call your doctor if you are having problems. It's also a good idea to know your test results and keep a list of the medicines you take. How can you care for yourself at home? · Take your medicines exactly as prescribed. Call your doctor if you think you are having a problem with your medicine. You may get medicine for nausea and vomiting if you have these side effects. · Follow your doctor's instructions to relieve pain. Pain from cancer and surgery can almost always be controlled. Use pain medicine when you first notice pain, before it becomes severe. · Eat healthy food. If you do not feel like eating, try to eat food that has protein and extra calories to keep up your strength and prevent weight loss. Drink liquid meal replacements for extra calories and protein. Try to eat your main meal early. · Get some physical activity every day, but do not get too tired. Keep doing the hobbies you enjoy as your energy allows. · Do not smoke. Smoking can make your cancer worse. If you need help quitting, talk to your doctor about stop-smoking programs and medicines.  These can increase your chances of quitting for good. · Take steps to control your stress and workload. Learn relaxation techniques. ¨ Share your feelings. Stress and tension affect our emotions. By expressing your feelings to others, you may be able to understand and cope with them. ¨ Consider joining a support group. Talking about a problem with your spouse, a good friend, or other people with similar problems is a good way to reduce tension and stress. ¨ Express yourself through art. Try writing, crafts, dance, or art to relieve stress. Some dance, writing, or art groups may be available just for people who have cancer. ¨ Be kind to your body and mind. Getting enough sleep, eating a healthy diet, and taking time to do things you enjoy can contribute to an overall feeling of balance in your life and can help reduce stress. ¨ Get help if you need it. Discuss your concerns with your doctor or counselor. · If you are vomiting or have diarrhea:  ¨ Drink plenty of fluids (enough so that your urine is light yellow or clear like water) to prevent dehydration. Choose water and other caffeine-free clear liquids. If you have kidney, heart, or liver disease and have to limit fluids, talk with your doctor before you increase the amount of fluids you drink. ¨ When you are able to eat, try clear soups, mild foods, and liquids until all symptoms are gone for 12 to 48 hours. Other good choices include dry toast, crackers, cooked cereal, and gelatin dessert, such as Jell-O.  · If you have not already done so, prepare a list of advance directives. Advance directives are instructions to your doctor and family members about what kind of care you want if you become unable to speak or express yourself. When should you call for help? Call 911 anytime you think you may need emergency care. For example, call if:  ? · You passed out (lost consciousness). ?Call your doctor now or seek immediate medical care if:  ? · You have a fever. ? · You have abnormal bleeding. ? · You think you have an infection. ? · You have new or worse pain. ? · You have new symptoms, such as a cough, belly pain, vomiting, diarrhea, or a rash. ? Watch closely for changes in your health, and be sure to contact your doctor if:  ? · You are much more tired than usual.   ? · You have swollen glands in your armpits, groin, or neck. ? · You do not get better as expected. Where can you learn more? Go to http://keily-tyson.info/. Enter V321 in the search box to learn more about \"Breast Cancer: Care Instructions. \"  Current as of: May 12, 2017  Content Version: 11.4  © 4273-1214 [employer]. Care instructions adapted under license by [employer] (which disclaims liability or warranty for this information). If you have questions about a medical condition or this instruction, always ask your healthcare professional. [address] any warranty or liability for your use of this information.

## 2018-03-07 NOTE — MR AVS SNAPSHOT
[address] [facility] 
[phone] Patient: Venkata Canales MRN: [id number] SOV:0/2/6403 Visit Information Date & Time Provider Department Dept. Phone Encounter #  
 3/7/2018  4:00 PM Jone Huffman  E [facility] [id number] Your Appointments 3/7/2018  4:00 PM  
BREAST TALK 30 with MD Ely Norton 22 ([facility] Appt Note: discuss surgical options/ [address] [address]  
[facility] [facility]  
  
    
 4/10/2018  8:30 AM  
SURGERY with MD Ely Norton 22 [facility]) Appt Note: [facility]-BILATERAL BREAST MASTECTOMIES AND RIGHT BREAST SNBX-LYMPHO ON 4-9 AT 3 PM//RECON [address] [place] [facility]  
  
    
 4/19/2018  8:30 AM  
New Patient with Kylie Nj MD  
[facility]) Appt Note: NP, breast cancer, diabetes 02/19/18 [facility]  
 [address] [place] [address] [address] [id number] 145 [facility] Ii [address] [place] [address]  
  
    
 4/23/2018 11:20 AM  
POST OP with Jone Huffman MD  
[address] [facility]) Appt Note: BILATERAL Breast post op Cp$0 3/2/18 TT  
 [address] [facility] [address]  
[phone]  
  
    
 10/3/2018 11:40 AM  
Follow Up with Miguel Avalos MD  
[facility] Appt Note: f/u migraine,lsw,10/3/17  
 [address] [place] [address], 
[address] [place] [address], [address] [facility]  
[phone] [address], [address] [place] [address]. Upcoming Health Maintenance Date Due Pneumococcal 19-64 Highest Risk (1 of 3 - PCV13) 3/5/1994 DTaP/Tdap/Td series (1 - Tdap) 3/5/1996 PAP AKA CERVICAL CYTOLOGY 3/5/1996 Influenza Age 5 to Adult 8/1/2017 Allergies as of 3/7/2018  Review Complete On: 2/14/2018 By: Cookie Morris MD  
 No Known Allergies Current Immunizations  Never Reviewed No immunizations on file. Not reviewed this visit Vitals LMP OB Status Smoking Status 02/08/2018 Having regular periods Never Smoker Your Updated Medication List  
  
   
This list is accurate as of 3/7/18  3:57 PM.  Always use your most recent med list.  
  
  
  
  
 norgestrel-ethinyl estradiol 0.3-30 mg-mcg Tab Commonly known as:  LO/OVRAL Take 1 Tab by mouth daily. propranolol LA 60 mg SR capsule Commonly known as:  INDERAL LA Take 2 Caps by mouth daily. To-Do List   
 04/03/2018 11:30 AM  
  Appointment with [facility] PAT EXAM RM 2 at [address] [place] [address] ([phone]) 04/09/2018 3:00 PM  
  Appointment with [facility] NM RM 3 at [address] ([phone]) Please bring any recent X-rays with you to the procedure. You must bring a LIST or BAG of all current medication you are taking with you to your appointment. Introducing [facility]! Dusty Valle introduces [employer] patient portal. Now you can access parts of your medical record, email your doctor's office, and request medication refills online. 1. In your internet browser, go to [email or web address]. [employer]Note[email or web address] 2. Click on the First Time User? Click Here link in the Sign In box. You will see the New Member Sign Up page. 3. Enter your [employer] Access Code exactly as it appears below. You will not need to use this code after youve completed the sign-up process.  If you do not sign up before the expiration date, you must request a new code. · [employer] Access Code: [email or web address] Expires: 5/15/2018 10:57 AM 
 
4. Enter the last four digits of your Social Security Number (xxxx) and Date of Birth (mm/dd/yyyy) as indicated and click Submit. You will be taken to the next sign-up page. 5. Create a [employer] ID. This will be your [employer] login ID and cannot be changed, so think of one that is secure and easy to remember. 6. Create a [employer] password. You can change your password at any time. 7. Enter your Password Reset Question and Answer. This can be used at a later time if you forget your password. 8. Enter your e-mail address. You will receive e-mail notification when new information is available in [address] 9. Click Sign Up. You can now view and download portions of your medical record. 10. Click the Download Summary menu link to download a portable copy of your medical information. If you have questions, please visit the Frequently Asked Questions section of the [employer] website. Remember, [employer] is NOT to be used for urgent needs. For medical emergencies, dial 911. Now available from your iPhone and Android! Please provide this summary of care documentation to your next provider. Your primary care clinician is listed as POLLO Valdivia 21. If you have any questions after today's visit, please call [phone].

## 2018-03-07 NOTE — PROGRESS NOTES
Patient is scheduled for a PRE-ADMISSION TESTING ON 04/03/18 @ 11:30 AM.  She should arrive at 11:00 am. report to [address] [place] [address], [address]; [address] [place] [address] [phone] following instructions:  NPO after Midnight the night before  Shower in AM, no lotion, deodorant, powder,perfume or makeup  Will need  morning of the surgery

## 2018-03-07 NOTE — PROGRESS NOTES
HISTORY OF PRESENT ILLNESS  America Aleman is a 37 y.o. female. HPI  ESTABLISHED patient here to discuss plan of care for RIGHT breast cancer. She and her  have multiple questions about her care. Denies pain. 44 yo female here today for second opinion. She has a T1 N0 IDC Grade 2 Er/Pr+ Her 2 foreign eq right breast cancer. HER2 positive by FISH    FH: Maternal cousin diagnosed with breast cancer in her 66's.     Screening mammogram in 11/2017: BI-RADS 1. Diagnostic pablo and US done 2/1/18 at [address]: BI-RADS 4B. 1.2 cm hypoechoic mass 10:00 upper outer quadrant. No abnormal nodes. Breast MRI, [facility], 2/26/18 BIRADS 6. No additional disease. Nodes normal.   Review of Systems   All other systems reviewed and are negative. Physical Exam   Pulmonary/Chest:   Exam unchanged. Nursing note and vitals reviewed. ASSESSMENT and PLAN    ICD-10-CM ICD-9-CM    1. Malignant neoplasm of upper-outer quadrant of right breast in female, estrogen receptor positive (HCC) C50.411 174.4     Z17.0 V86.0      38 yo female with T1 N0 IDC Grade 2 Er/Pr+ Her 2 foreign eq right breast cancer. HER2 positive by FISH  Gene panel negative for mutation. She has elected for bilateral mastectomies, right sln biopsy, reconstruction. Since her Her 2 is positive will have her see medical oncology preop for discussion of adjuvant chemo and her 2 blockade. Will get her this appointment. Will place port at surgery. She is already scheduled. I have answered all of her questions regarding postop care and activities/restrictions. They were happy with plan. Total discussion time 30 minutes.

## 2018-03-09 DIAGNOSIS — Z17.0 MALIGNANT NEOPLASM OF UPPER-OUTER QUADRANT OF RIGHT BREAST IN FEMALE, ESTROGEN RECEPTOR POSITIVE (HCC): Primary | ICD-10-CM

## 2018-03-09 DIAGNOSIS — C50.411 MALIGNANT NEOPLASM OF UPPER-OUTER QUADRANT OF RIGHT BREAST IN FEMALE, ESTROGEN RECEPTOR POSITIVE (HCC): Primary | ICD-10-CM

## 2018-03-13 ENCOUNTER — TELEPHONE (OUTPATIENT)
Dept: SURGERY | Age: 43
End: 2018-03-13

## 2018-03-13 NOTE — TELEPHONE ENCOUNTER
Patient L/M with the answering service asking about her appointment with Dr. Indu Dubois. I checked the chart and saw that her records have been sent to Dr. Indu Dubois, however there is no appointment in the chart. I called Dr. Valerie Arriola office. They do have her records, but an appointment has not been made yet. They will call her in the next day or so. I called patient to let her know this. I asked her to call us back in a couple of days if she still has not heard from them. She was very appreciative of the phone call.

## 2018-03-14 ENCOUNTER — DOCUMENTATION ONLY (OUTPATIENT)
Dept: SURGERY | Age: 43
End: 2018-03-14

## 2018-03-14 NOTE — PROGRESS NOTES
PATIENT LEFT [facility] MESSAGE THAT HER PAPERWORK FOR Talya Rockwell HAD NOT BEEN FILLED OUT COMPLETELY AND WAS DUE BY 3-18-18. I COMPLETED THE FORM AND FAXED TODAY. COMPLETED. ALSO RECEIVED DISABILITY PAPERWORK VIA FAX YESTERDAY WHICH IS DUE BY April 30. I RETURNED PATIENTS PHONE CALL AND LEFT A VOICEMAIL THAT EVERYTHING HAD BEEN TAKEN CARE OF AND THAT THE 2ND SET OF PAPERS WOULD BE DONE WHEN TOSIN GOT BACK FROM HER TIME OFF AND BEFORE April 30.

## 2018-03-16 ENCOUNTER — DOCUMENTATION ONLY (OUTPATIENT)
Dept: SURGERY | Age: 43
End: 2018-03-16

## 2018-03-16 NOTE — PROGRESS NOTES
Received a call from Fransico Sauer, nurse navigator at [facility]. She ordered the patient a camisole and bra from when she had attended a class with her and needs for us to send a prescription to [facility] in order for them to get paid. I faxed a prescription along with an office note to [facility] with confirmation.

## 2018-03-22 DIAGNOSIS — C50.411 MALIGNANT NEOPLASM OF UPPER-OUTER QUADRANT OF RIGHT BREAST IN FEMALE, ESTROGEN RECEPTOR POSITIVE (HCC): Primary | ICD-10-CM

## 2018-03-22 DIAGNOSIS — Z17.0 MALIGNANT NEOPLASM OF UPPER-OUTER QUADRANT OF RIGHT BREAST IN FEMALE, ESTROGEN RECEPTOR POSITIVE (HCC): Primary | ICD-10-CM

## 2018-04-03 ENCOUNTER — HOSPITAL ENCOUNTER (OUTPATIENT)
Dept: PREADMISSION TESTING | Age: 43
Discharge: HOME | End: 2018-04-03
Payer: COMMERCIAL

## 2018-04-03 ENCOUNTER — TELEPHONE (OUTPATIENT)
Dept: SURGERY | Age: 43
End: 2018-04-03

## 2018-04-03 ENCOUNTER — HOSPITAL ENCOUNTER (OUTPATIENT)
Dept: GENERAL RADIOLOGY | Age: 43
Discharge: HOME | End: 2018-04-03
Payer: COMMERCIAL

## 2018-04-03 VITALS
HEART RATE: 87 BPM | TEMPERATURE: 98 F | SYSTOLIC BLOOD PRESSURE: 144 MMHG | WEIGHT: 114.5 LBS | BODY MASS INDEX: 22.48 KG/M2 | RESPIRATION RATE: 20 BRPM | DIASTOLIC BLOOD PRESSURE: 79 MMHG | HEIGHT: 60 IN

## 2018-04-03 LAB
ANION GAP SERPL CALC-SCNC: 6 MMOL/L (ref 5–15)
ATRIAL RATE: 70 BPM
BASOPHILS # BLD: 0 K/UL (ref 0–0.1)
BASOPHILS NFR BLD: 0 % (ref 0–1)
BUN SERPL-MCNC: 12 MG/DL (ref 6–20)
BUN/CREAT SERPL: 14 (ref 12–20)
CALCIUM SERPL-MCNC: 9.5 MG/DL (ref 8.5–10.1)
CALCULATED P AXIS, ECG09: 64 DEGREES
CALCULATED R AXIS, ECG10: 25 DEGREES
CALCULATED T AXIS, ECG11: 18 DEGREES
CHLORIDE SERPL-SCNC: 106 MMOL/L (ref 97–108)
CO2 SERPL-SCNC: 26 MMOL/L (ref 21–32)
CREAT SERPL-MCNC: 0.85 MG/DL (ref 0.55–1.02)
DIAGNOSIS, 93000: NORMAL
DIFFERENTIAL METHOD BLD: ABNORMAL
EOSINOPHIL # BLD: 0 K/UL (ref 0–0.4)
EOSINOPHIL NFR BLD: 1 % (ref 0–7)
ERYTHROCYTE [DISTWIDTH] IN BLOOD BY AUTOMATED COUNT: 12.5 % (ref 11.5–14.5)
GLUCOSE SERPL-MCNC: 106 MG/DL (ref 65–100)
HCT VFR BLD AUTO: 39.1 % (ref 35–47)
HGB BLD-MCNC: 13.6 G/DL (ref 11.5–16)
IMM GRANULOCYTES # BLD: 0 K/UL (ref 0–0.04)
IMM GRANULOCYTES NFR BLD AUTO: 1 % (ref 0–0.5)
LYMPHOCYTES # BLD: 1.1 K/UL (ref 0.8–3.5)
LYMPHOCYTES NFR BLD: 17 % (ref 12–49)
MCH RBC QN AUTO: 31.4 PG (ref 26–34)
MCHC RBC AUTO-ENTMCNC: 34.8 G/DL (ref 30–36.5)
MCV RBC AUTO: 90.3 FL (ref 80–99)
MONOCYTES # BLD: 0.4 K/UL (ref 0–1)
MONOCYTES NFR BLD: 5 % (ref 5–13)
NEUTS SEG # BLD: 5 K/UL (ref 1.8–8)
NEUTS SEG NFR BLD: 76 % (ref 32–75)
NRBC # BLD: 0 K/UL (ref 0–0.01)
NRBC BLD-RTO: 0 PER 100 WBC
P-R INTERVAL, ECG05: 154 MS
PLATELET # BLD AUTO: 133 K/UL (ref 150–400)
PMV BLD AUTO: 12.9 FL (ref 8.9–12.9)
POTASSIUM SERPL-SCNC: 4.2 MMOL/L (ref 3.5–5.1)
Q-T INTERVAL, ECG07: 374 MS
QRS DURATION, ECG06: 70 MS
QTC CALCULATION (BEZET), ECG08: 403 MS
RBC # BLD AUTO: 4.33 M/UL (ref 3.8–5.2)
SODIUM SERPL-SCNC: 138 MMOL/L (ref 136–145)
VENTRICULAR RATE, ECG03: 70 BPM
WBC # BLD AUTO: 6.5 K/UL (ref 3.6–11)

## 2018-04-03 PROCEDURE — 93005 ELECTROCARDIOGRAM TRACING: CPT

## 2018-04-03 PROCEDURE — 85025 COMPLETE CBC W/AUTO DIFF WBC: CPT | Performed by: SURGERY

## 2018-04-03 PROCEDURE — 71046 X-RAY EXAM CHEST 2 VIEWS: CPT

## 2018-04-03 PROCEDURE — 80048 BASIC METABOLIC PNL TOTAL CA: CPT | Performed by: SURGERY

## 2018-04-03 RX ORDER — ASPIRIN 325 MG
325 TABLET ORAL AS NEEDED
COMMUNITY
End: 2018-04-11

## 2018-04-03 RX ORDER — BISMUTH SUBSALICYLATE 262 MG
1 TABLET,CHEWABLE ORAL DAILY
COMMUNITY

## 2018-04-03 NOTE — TELEPHONE ENCOUNTER
Corin from Dr. Kwong [facility] office called and left message on nurse voicemail stating that patient's needs a port-a-cath.

## 2018-04-04 DIAGNOSIS — Z17.0 MALIGNANT NEOPLASM OF UPPER-OUTER QUADRANT OF RIGHT BREAST IN FEMALE, ESTROGEN RECEPTOR POSITIVE (HCC): Primary | ICD-10-CM

## 2018-04-04 DIAGNOSIS — C50.411 MALIGNANT NEOPLASM OF UPPER-OUTER QUADRANT OF RIGHT BREAST IN FEMALE, ESTROGEN RECEPTOR POSITIVE (HCC): Primary | ICD-10-CM

## 2018-04-04 NOTE — TELEPHONE ENCOUNTER
Elvira-I just noticed the patient is having surgery on April 10th with you, did you want the port placement added to that surgery? Or does she need it put in before that surgery?

## 2018-04-09 ENCOUNTER — ANESTHESIA EVENT (OUTPATIENT)
Dept: MEDSURG UNIT | Age: 43
End: 2018-04-09
Payer: COMMERCIAL

## 2018-04-09 ENCOUNTER — HOSPITAL ENCOUNTER (OUTPATIENT)
Dept: NUCLEAR MEDICINE | Age: 43
Discharge: HOME | End: 2018-04-09
Attending: SURGERY
Payer: COMMERCIAL

## 2018-04-09 DIAGNOSIS — C50.919 BREAST CANCER (HCC): ICD-10-CM

## 2018-04-09 PROCEDURE — 78195 LYMPH SYSTEM IMAGING: CPT

## 2018-04-10 ENCOUNTER — APPOINTMENT (OUTPATIENT)
Dept: GENERAL RADIOLOGY | Age: 43
End: 2018-04-10
Attending: SURGERY
Payer: COMMERCIAL

## 2018-04-10 ENCOUNTER — ANESTHESIA (OUTPATIENT)
Dept: MEDSURG UNIT | Age: 43
End: 2018-04-10
Payer: COMMERCIAL

## 2018-04-10 ENCOUNTER — HOSPITAL ENCOUNTER (OUTPATIENT)
Age: 43
Setting detail: OBSERVATION
Discharge: HOME | End: 2018-04-11
Attending: SURGERY | Admitting: PLASTIC SURGERY
Payer: COMMERCIAL

## 2018-04-10 DIAGNOSIS — Z17.0 MALIGNANT NEOPLASM OF UPPER-OUTER QUADRANT OF RIGHT BREAST IN FEMALE, ESTROGEN RECEPTOR POSITIVE (HCC): ICD-10-CM

## 2018-04-10 DIAGNOSIS — C50.411 MALIGNANT NEOPLASM OF UPPER-OUTER QUADRANT OF RIGHT BREAST IN FEMALE, ESTROGEN RECEPTOR POSITIVE (HCC): ICD-10-CM

## 2018-04-10 LAB — HCG UR QL: NEGATIVE

## 2018-04-10 PROCEDURE — 77030011264 HC ELECTRD BLD EXT COVD -A: Performed by: SURGERY

## 2018-04-10 PROCEDURE — 77030002916 HC SUT ETHLN J&J -A: Performed by: SURGERY

## 2018-04-10 PROCEDURE — 74011000258 HC RX REV CODE- 258: Performed by: PLASTIC SURGERY

## 2018-04-10 PROCEDURE — 88331 PATH CONSLTJ SURG 1 BLK 1SPC: CPT | Performed by: SURGERY

## 2018-04-10 PROCEDURE — 74011250636 HC RX REV CODE- 250/636

## 2018-04-10 PROCEDURE — 77030010514 HC APPL CLP LIG COVD -B: Performed by: SURGERY

## 2018-04-10 PROCEDURE — 99218 HC RM OBSERVATION: CPT

## 2018-04-10 PROCEDURE — 77030012935 HC DRSG AQUACEL BMS -B: Performed by: SURGERY

## 2018-04-10 PROCEDURE — C9290 INJ, BUPIVACAINE LIPOSOME: HCPCS | Performed by: SURGERY

## 2018-04-10 PROCEDURE — C1788 PORT, INDWELLING, IMP: HCPCS | Performed by: SURGERY

## 2018-04-10 PROCEDURE — 77030012407 HC DRN WND BARD -B: Performed by: SURGERY

## 2018-04-10 PROCEDURE — 88307 TISSUE EXAM BY PATHOLOGIST: CPT | Performed by: SURGERY

## 2018-04-10 PROCEDURE — 77030013674 HC FIL EXPND TISS ALGN -A: Performed by: SURGERY

## 2018-04-10 PROCEDURE — 77030031139 HC SUT VCRL2 J&J -A: Performed by: SURGERY

## 2018-04-10 PROCEDURE — 77030020255 HC SOL INJ LR 1000ML BG: Performed by: SURGERY

## 2018-04-10 PROCEDURE — 74011000258 HC RX REV CODE- 258

## 2018-04-10 PROCEDURE — 77030032490 HC SLV COMPR SCD KNE COVD -B: Performed by: SURGERY

## 2018-04-10 PROCEDURE — 77030026438 HC STYL ET INTUB CARD -A: Performed by: NURSE ANESTHETIST, CERTIFIED REGISTERED

## 2018-04-10 PROCEDURE — 74011250636 HC RX REV CODE- 250/636: Performed by: ANESTHESIOLOGY

## 2018-04-10 PROCEDURE — 76030000009 HC AMB SURG OR TIME 4.5 TO 5: Performed by: SURGERY

## 2018-04-10 PROCEDURE — 74011250637 HC RX REV CODE- 250/637: Performed by: PLASTIC SURGERY

## 2018-04-10 PROCEDURE — 77030010507 HC ADH SKN DERMBND J&J -B: Performed by: SURGERY

## 2018-04-10 PROCEDURE — 77030034850: Performed by: SURGERY

## 2018-04-10 PROCEDURE — 77030033138 HC SUT PGA STRATFX J&J -B: Performed by: SURGERY

## 2018-04-10 PROCEDURE — 88342 IMHCHEM/IMCYTCHM 1ST ANTB: CPT | Performed by: SURGERY

## 2018-04-10 PROCEDURE — 77030019908 HC STETH ESOPH SIMS -A: Performed by: NURSE ANESTHETIST, CERTIFIED REGISTERED

## 2018-04-10 PROCEDURE — 77030013567 HC DRN WND RESERV BARD -A: Performed by: SURGERY

## 2018-04-10 PROCEDURE — 81025 URINE PREGNANCY TEST: CPT

## 2018-04-10 PROCEDURE — 74011000250 HC RX REV CODE- 250: Performed by: SURGERY

## 2018-04-10 PROCEDURE — 71045 X-RAY EXAM CHEST 1 VIEW: CPT

## 2018-04-10 PROCEDURE — 77030011825 HC SUPP SURG PSTOP S2SG -B: Performed by: SURGERY

## 2018-04-10 PROCEDURE — 74011250636 HC RX REV CODE- 250/636: Performed by: PLASTIC SURGERY

## 2018-04-10 PROCEDURE — 77030020782 HC GWN BAIR PAWS FLX 3M -B

## 2018-04-10 PROCEDURE — 77030008684 HC TU ET CUF COVD -B: Performed by: NURSE ANESTHETIST, CERTIFIED REGISTERED

## 2018-04-10 PROCEDURE — 77030008467 HC STPLR SKN COVD -B: Performed by: SURGERY

## 2018-04-10 PROCEDURE — 77030002996 HC SUT SLK J&J -A: Performed by: SURGERY

## 2018-04-10 PROCEDURE — 77030002966 HC SUT PDS J&J -A: Performed by: SURGERY

## 2018-04-10 PROCEDURE — 76210000037 HC AMBSU PH I REC 2 TO 2.5 HR: Performed by: SURGERY

## 2018-04-10 PROCEDURE — 77030002933 HC SUT MCRYL J&J -A: Performed by: SURGERY

## 2018-04-10 PROCEDURE — 76060000069 HC AMB SURG ANES 4.5 TO 5 HR: Performed by: SURGERY

## 2018-04-10 PROCEDURE — 77030018836 HC SOL IRR NACL ICUM -A: Performed by: SURGERY

## 2018-04-10 PROCEDURE — 77030019702 HC WRP THER MENM -C: Performed by: SURGERY

## 2018-04-10 PROCEDURE — 77030011640 HC PAD GRND REM COVD -A: Performed by: SURGERY

## 2018-04-10 PROCEDURE — 74011000250 HC RX REV CODE- 250

## 2018-04-10 PROCEDURE — 74011250636 HC RX REV CODE- 250/636: Performed by: SURGERY

## 2018-04-10 PROCEDURE — 76000 FLUOROSCOPY <1 HR PHYS/QHP: CPT

## 2018-04-10 PROCEDURE — 77030018719 HC DRSG PTCH ANTIMIC J&J -A: Performed by: SURGERY

## 2018-04-10 PROCEDURE — 77030011267 HC ELECTRD BLD COVD -A: Performed by: SURGERY

## 2018-04-10 PROCEDURE — C1789 PROSTHESIS, BREAST, IMP: HCPCS | Performed by: SURGERY

## 2018-04-10 PROCEDURE — 77030034626 HC LIGASURE SM JAW SEAL OPN SURG COVD -E: Performed by: SURGERY

## 2018-04-10 DEVICE — Z DISCONTINUED SEE COMMENTS IMPLANT HUM TISS W96XH1.04-2.28XL193MM ACELLULAR DERM TISS: Type: IMPLANTABLE DEVICE | Site: BREAST | Status: FUNCTIONAL

## 2018-04-10 DEVICE — TEXTURED, HIGH PROFILE, SUTURE TABS, INTEGRAL INJECTION DOME, 475CC
Type: IMPLANTABLE DEVICE | Site: BREAST | Status: FUNCTIONAL
Brand: ARTOURA BREAST TISSUE EXPANDER

## 2018-04-10 DEVICE — SYSTEM INFUS PRT CATH 8FR L66CM INTRO 8FR CHST TI SGL LUMN: Type: IMPLANTABLE DEVICE | Site: CHEST | Status: FUNCTIONAL

## 2018-04-10 RX ORDER — BUPIVACAINE HYDROCHLORIDE 5 MG/ML
INJECTION, SOLUTION EPIDURAL; INTRACAUDAL AS NEEDED
Status: DISCONTINUED | OUTPATIENT
Start: 2018-04-10 | End: 2018-04-10 | Stop reason: HOSPADM

## 2018-04-10 RX ORDER — MIDAZOLAM HYDROCHLORIDE 1 MG/ML
1 INJECTION, SOLUTION INTRAMUSCULAR; INTRAVENOUS AS NEEDED
Status: DISCONTINUED | OUTPATIENT
Start: 2018-04-10 | End: 2018-04-10 | Stop reason: HOSPADM

## 2018-04-10 RX ORDER — HYDROMORPHONE HYDROCHLORIDE 2 MG/1
2 TABLET ORAL
Status: DISCONTINUED | OUTPATIENT
Start: 2018-04-10 | End: 2018-04-11 | Stop reason: HOSPADM

## 2018-04-10 RX ORDER — ROCURONIUM BROMIDE 10 MG/ML
INJECTION, SOLUTION INTRAVENOUS AS NEEDED
Status: DISCONTINUED | OUTPATIENT
Start: 2018-04-10 | End: 2018-04-10 | Stop reason: HOSPADM

## 2018-04-10 RX ORDER — DEXAMETHASONE SODIUM PHOSPHATE 4 MG/ML
INJECTION, SOLUTION INTRA-ARTICULAR; INTRALESIONAL; INTRAMUSCULAR; INTRAVENOUS; SOFT TISSUE AS NEEDED
Status: DISCONTINUED | OUTPATIENT
Start: 2018-04-10 | End: 2018-04-10 | Stop reason: HOSPADM

## 2018-04-10 RX ORDER — ACETAMINOPHEN 10 MG/ML
INJECTION, SOLUTION INTRAVENOUS AS NEEDED
Status: DISCONTINUED | OUTPATIENT
Start: 2018-04-10 | End: 2018-04-10 | Stop reason: HOSPADM

## 2018-04-10 RX ORDER — PROPRANOLOL HYDROCHLORIDE 120 MG/1
120 CAPSULE, EXTENDED RELEASE ORAL
Status: DISCONTINUED | OUTPATIENT
Start: 2018-04-10 | End: 2018-04-11 | Stop reason: HOSPADM

## 2018-04-10 RX ORDER — ONDANSETRON 2 MG/ML
INJECTION INTRAMUSCULAR; INTRAVENOUS AS NEEDED
Status: DISCONTINUED | OUTPATIENT
Start: 2018-04-10 | End: 2018-04-10 | Stop reason: HOSPADM

## 2018-04-10 RX ORDER — NALOXONE HYDROCHLORIDE 0.4 MG/ML
0.4 INJECTION, SOLUTION INTRAMUSCULAR; INTRAVENOUS; SUBCUTANEOUS AS NEEDED
Status: DISCONTINUED | OUTPATIENT
Start: 2018-04-10 | End: 2018-04-11 | Stop reason: HOSPADM

## 2018-04-10 RX ORDER — DIPHENHYDRAMINE HYDROCHLORIDE 50 MG/ML
12.5 INJECTION, SOLUTION INTRAMUSCULAR; INTRAVENOUS
Status: ACTIVE | OUTPATIENT
Start: 2018-04-10 | End: 2018-04-11

## 2018-04-10 RX ORDER — SODIUM CHLORIDE 0.9 % (FLUSH) 0.9 %
5-10 SYRINGE (ML) INJECTION EVERY 8 HOURS
Status: DISCONTINUED | OUTPATIENT
Start: 2018-04-10 | End: 2018-04-11 | Stop reason: HOSPADM

## 2018-04-10 RX ORDER — SODIUM CHLORIDE 0.9 % (FLUSH) 0.9 %
5-10 SYRINGE (ML) INJECTION AS NEEDED
Status: DISCONTINUED | OUTPATIENT
Start: 2018-04-10 | End: 2018-04-10 | Stop reason: HOSPADM

## 2018-04-10 RX ORDER — GLYCOPYRROLATE 0.2 MG/ML
INJECTION INTRAMUSCULAR; INTRAVENOUS AS NEEDED
Status: DISCONTINUED | OUTPATIENT
Start: 2018-04-10 | End: 2018-04-10 | Stop reason: HOSPADM

## 2018-04-10 RX ORDER — SODIUM CHLORIDE, SODIUM LACTATE, POTASSIUM CHLORIDE, CALCIUM CHLORIDE 600; 310; 30; 20 MG/100ML; MG/100ML; MG/100ML; MG/100ML
50 INJECTION, SOLUTION INTRAVENOUS CONTINUOUS
Status: DISCONTINUED | OUTPATIENT
Start: 2018-04-10 | End: 2018-04-11 | Stop reason: HOSPADM

## 2018-04-10 RX ORDER — PROPOFOL 10 MG/ML
INJECTION, EMULSION INTRAVENOUS AS NEEDED
Status: DISCONTINUED | OUTPATIENT
Start: 2018-04-10 | End: 2018-04-10 | Stop reason: HOSPADM

## 2018-04-10 RX ORDER — LIDOCAINE HYDROCHLORIDE 20 MG/ML
INJECTION, SOLUTION EPIDURAL; INFILTRATION; INTRACAUDAL; PERINEURAL AS NEEDED
Status: DISCONTINUED | OUTPATIENT
Start: 2018-04-10 | End: 2018-04-10 | Stop reason: HOSPADM

## 2018-04-10 RX ORDER — FENTANYL CITRATE 50 UG/ML
50 INJECTION, SOLUTION INTRAMUSCULAR; INTRAVENOUS AS NEEDED
Status: DISCONTINUED | OUTPATIENT
Start: 2018-04-10 | End: 2018-04-10 | Stop reason: HOSPADM

## 2018-04-10 RX ORDER — FENTANYL CITRATE 50 UG/ML
25 INJECTION, SOLUTION INTRAMUSCULAR; INTRAVENOUS
Status: DISCONTINUED | OUTPATIENT
Start: 2018-04-10 | End: 2018-04-10 | Stop reason: HOSPADM

## 2018-04-10 RX ORDER — MIDAZOLAM HYDROCHLORIDE 1 MG/ML
INJECTION, SOLUTION INTRAMUSCULAR; INTRAVENOUS AS NEEDED
Status: DISCONTINUED | OUTPATIENT
Start: 2018-04-10 | End: 2018-04-10 | Stop reason: HOSPADM

## 2018-04-10 RX ORDER — SODIUM CHLORIDE 9 MG/ML
25 INJECTION, SOLUTION INTRAVENOUS CONTINUOUS
Status: DISCONTINUED | OUTPATIENT
Start: 2018-04-10 | End: 2018-04-10 | Stop reason: HOSPADM

## 2018-04-10 RX ORDER — SODIUM CHLORIDE 0.9 % (FLUSH) 0.9 %
5-10 SYRINGE (ML) INJECTION AS NEEDED
Status: DISCONTINUED | OUTPATIENT
Start: 2018-04-10 | End: 2018-04-11 | Stop reason: HOSPADM

## 2018-04-10 RX ORDER — DIPHENHYDRAMINE HYDROCHLORIDE 50 MG/ML
12.5 INJECTION, SOLUTION INTRAMUSCULAR; INTRAVENOUS AS NEEDED
Status: DISCONTINUED | OUTPATIENT
Start: 2018-04-10 | End: 2018-04-10 | Stop reason: HOSPADM

## 2018-04-10 RX ORDER — HYDROMORPHONE HYDROCHLORIDE 2 MG/ML
INJECTION, SOLUTION INTRAMUSCULAR; INTRAVENOUS; SUBCUTANEOUS AS NEEDED
Status: DISCONTINUED | OUTPATIENT
Start: 2018-04-10 | End: 2018-04-10 | Stop reason: HOSPADM

## 2018-04-10 RX ORDER — ACETAMINOPHEN 325 MG/1
650 TABLET ORAL
Status: DISCONTINUED | OUTPATIENT
Start: 2018-04-10 | End: 2018-04-11 | Stop reason: HOSPADM

## 2018-04-10 RX ORDER — NEOSTIGMINE METHYLSULFATE 1 MG/ML
INJECTION INTRAVENOUS AS NEEDED
Status: DISCONTINUED | OUTPATIENT
Start: 2018-04-10 | End: 2018-04-10 | Stop reason: HOSPADM

## 2018-04-10 RX ORDER — LIDOCAINE HYDROCHLORIDE 10 MG/ML
0.1 INJECTION, SOLUTION EPIDURAL; INFILTRATION; INTRACAUDAL; PERINEURAL AS NEEDED
Status: DISCONTINUED | OUTPATIENT
Start: 2018-04-10 | End: 2018-04-10 | Stop reason: HOSPADM

## 2018-04-10 RX ORDER — EPHEDRINE SULFATE 50 MG/ML
INJECTION, SOLUTION INTRAVENOUS AS NEEDED
Status: DISCONTINUED | OUTPATIENT
Start: 2018-04-10 | End: 2018-04-10 | Stop reason: HOSPADM

## 2018-04-10 RX ORDER — ONDANSETRON 2 MG/ML
4 INJECTION INTRAMUSCULAR; INTRAVENOUS
Status: DISCONTINUED | OUTPATIENT
Start: 2018-04-10 | End: 2018-04-11 | Stop reason: HOSPADM

## 2018-04-10 RX ORDER — SODIUM CHLORIDE, SODIUM LACTATE, POTASSIUM CHLORIDE, CALCIUM CHLORIDE 600; 310; 30; 20 MG/100ML; MG/100ML; MG/100ML; MG/100ML
125 INJECTION, SOLUTION INTRAVENOUS CONTINUOUS
Status: DISPENSED | OUTPATIENT
Start: 2018-04-10 | End: 2018-04-11

## 2018-04-10 RX ORDER — ONDANSETRON 2 MG/ML
4 INJECTION INTRAMUSCULAR; INTRAVENOUS AS NEEDED
Status: DISCONTINUED | OUTPATIENT
Start: 2018-04-10 | End: 2018-04-10 | Stop reason: HOSPADM

## 2018-04-10 RX ORDER — SODIUM CHLORIDE, SODIUM LACTATE, POTASSIUM CHLORIDE, CALCIUM CHLORIDE 600; 310; 30; 20 MG/100ML; MG/100ML; MG/100ML; MG/100ML
125 INJECTION, SOLUTION INTRAVENOUS CONTINUOUS
Status: DISCONTINUED | OUTPATIENT
Start: 2018-04-10 | End: 2018-04-10 | Stop reason: HOSPADM

## 2018-04-10 RX ORDER — SODIUM CHLORIDE, SODIUM LACTATE, POTASSIUM CHLORIDE, CALCIUM CHLORIDE 600; 310; 30; 20 MG/100ML; MG/100ML; MG/100ML; MG/100ML
INJECTION, SOLUTION INTRAVENOUS
Status: DISCONTINUED | OUTPATIENT
Start: 2018-04-10 | End: 2018-04-10 | Stop reason: HOSPADM

## 2018-04-10 RX ORDER — CEFAZOLIN SODIUM IN 0.9 % NACL 2 G/100 ML
PLASTIC BAG, INJECTION (ML) INTRAVENOUS AS NEEDED
Status: DISCONTINUED | OUTPATIENT
Start: 2018-04-10 | End: 2018-04-10 | Stop reason: HOSPADM

## 2018-04-10 RX ORDER — MIDAZOLAM HYDROCHLORIDE 1 MG/ML
0.5 INJECTION, SOLUTION INTRAMUSCULAR; INTRAVENOUS
Status: DISCONTINUED | OUTPATIENT
Start: 2018-04-10 | End: 2018-04-10 | Stop reason: HOSPADM

## 2018-04-10 RX ORDER — DIAZEPAM 5 MG/1
5 TABLET ORAL
Status: DISCONTINUED | OUTPATIENT
Start: 2018-04-10 | End: 2018-04-11 | Stop reason: HOSPADM

## 2018-04-10 RX ORDER — MORPHINE SULFATE 10 MG/ML
2 INJECTION, SOLUTION INTRAMUSCULAR; INTRAVENOUS
Status: DISCONTINUED | OUTPATIENT
Start: 2018-04-10 | End: 2018-04-10 | Stop reason: HOSPADM

## 2018-04-10 RX ORDER — PROPOFOL 10 MG/ML
INJECTION, EMULSION INTRAVENOUS
Status: DISCONTINUED | OUTPATIENT
Start: 2018-04-10 | End: 2018-04-10 | Stop reason: HOSPADM

## 2018-04-10 RX ORDER — HYDROMORPHONE HYDROCHLORIDE 2 MG/ML
0.5 INJECTION, SOLUTION INTRAMUSCULAR; INTRAVENOUS; SUBCUTANEOUS
Status: DISCONTINUED | OUTPATIENT
Start: 2018-04-10 | End: 2018-04-11 | Stop reason: HOSPADM

## 2018-04-10 RX ORDER — OXYCODONE AND ACETAMINOPHEN 5; 325 MG/1; MG/1
1 TABLET ORAL AS NEEDED
Status: DISCONTINUED | OUTPATIENT
Start: 2018-04-10 | End: 2018-04-10 | Stop reason: HOSPADM

## 2018-04-10 RX ADMIN — ROCURONIUM BROMIDE 20 MG: 10 INJECTION, SOLUTION INTRAVENOUS at 10:43

## 2018-04-10 RX ADMIN — Medication 10 ML: at 20:43

## 2018-04-10 RX ADMIN — EPHEDRINE SULFATE 10 MG: 50 INJECTION, SOLUTION INTRAVENOUS at 11:09

## 2018-04-10 RX ADMIN — GLYCOPYRROLATE 0.3 MG: 0.2 INJECTION INTRAMUSCULAR; INTRAVENOUS at 13:09

## 2018-04-10 RX ADMIN — SODIUM CHLORIDE, SODIUM LACTATE, POTASSIUM CHLORIDE, CALCIUM CHLORIDE: 600; 310; 30; 20 INJECTION, SOLUTION INTRAVENOUS at 11:22

## 2018-04-10 RX ADMIN — NEOSTIGMINE METHYLSULFATE 3 MG: 1 INJECTION INTRAVENOUS at 13:09

## 2018-04-10 RX ADMIN — ONDANSETRON 4 MG: 2 INJECTION INTRAMUSCULAR; INTRAVENOUS at 17:48

## 2018-04-10 RX ADMIN — DEXAMETHASONE SODIUM PHOSPHATE 12 MG: 4 INJECTION, SOLUTION INTRA-ARTICULAR; INTRALESIONAL; INTRAMUSCULAR; INTRAVENOUS; SOFT TISSUE at 09:16

## 2018-04-10 RX ADMIN — Medication 2 G: at 09:25

## 2018-04-10 RX ADMIN — EPHEDRINE SULFATE 10 MG: 50 INJECTION, SOLUTION INTRAVENOUS at 11:47

## 2018-04-10 RX ADMIN — SODIUM CHLORIDE, SODIUM LACTATE, POTASSIUM CHLORIDE, CALCIUM CHLORIDE: 600; 310; 30; 20 INJECTION, SOLUTION INTRAVENOUS at 10:13

## 2018-04-10 RX ADMIN — Medication 2 G: at 13:14

## 2018-04-10 RX ADMIN — MIDAZOLAM HYDROCHLORIDE 2 MG: 1 INJECTION, SOLUTION INTRAMUSCULAR; INTRAVENOUS at 09:14

## 2018-04-10 RX ADMIN — EPHEDRINE SULFATE 10 MG: 50 INJECTION, SOLUTION INTRAVENOUS at 12:51

## 2018-04-10 RX ADMIN — PROPOFOL 150 MG: 10 INJECTION, EMULSION INTRAVENOUS at 09:16

## 2018-04-10 RX ADMIN — SODIUM CHLORIDE 1000 MG: 900 INJECTION, SOLUTION INTRAVENOUS at 20:41

## 2018-04-10 RX ADMIN — EPHEDRINE SULFATE 10 MG: 50 INJECTION, SOLUTION INTRAVENOUS at 10:14

## 2018-04-10 RX ADMIN — EPHEDRINE SULFATE 20 MG: 50 INJECTION, SOLUTION INTRAVENOUS at 13:23

## 2018-04-10 RX ADMIN — MIDAZOLAM HYDROCHLORIDE 3 MG: 1 INJECTION, SOLUTION INTRAMUSCULAR; INTRAVENOUS at 09:08

## 2018-04-10 RX ADMIN — HYDROMORPHONE HYDROCHLORIDE 0.5 MG: 2 INJECTION INTRAMUSCULAR; INTRAVENOUS; SUBCUTANEOUS at 22:45

## 2018-04-10 RX ADMIN — HYDROMORPHONE HYDROCHLORIDE 2 MG: 2 INJECTION, SOLUTION INTRAMUSCULAR; INTRAVENOUS; SUBCUTANEOUS at 09:16

## 2018-04-10 RX ADMIN — SODIUM CHLORIDE, SODIUM LACTATE, POTASSIUM CHLORIDE, AND CALCIUM CHLORIDE 125 ML/HR: 600; 310; 30; 20 INJECTION, SOLUTION INTRAVENOUS at 08:20

## 2018-04-10 RX ADMIN — PROPRANOLOL HYDROCHLORIDE 120 MG: 120 CAPSULE, EXTENDED RELEASE ORAL at 20:41

## 2018-04-10 RX ADMIN — SODIUM CHLORIDE, SODIUM LACTATE, POTASSIUM CHLORIDE, CALCIUM CHLORIDE: 600; 310; 30; 20 INJECTION, SOLUTION INTRAVENOUS at 13:20

## 2018-04-10 RX ADMIN — ROCURONIUM BROMIDE 50 MG: 10 INJECTION, SOLUTION INTRAVENOUS at 09:17

## 2018-04-10 RX ADMIN — ACETAMINOPHEN 1000 MG: 10 INJECTION, SOLUTION INTRAVENOUS at 09:37

## 2018-04-10 RX ADMIN — PROCHLORPERAZINE EDISYLATE 5 MG: 5 INJECTION INTRAMUSCULAR; INTRAVENOUS at 18:52

## 2018-04-10 RX ADMIN — ONDANSETRON 4 MG: 2 INJECTION INTRAMUSCULAR; INTRAVENOUS at 09:16

## 2018-04-10 RX ADMIN — EPHEDRINE SULFATE 10 MG: 50 INJECTION, SOLUTION INTRAVENOUS at 10:33

## 2018-04-10 RX ADMIN — SODIUM CHLORIDE, SODIUM LACTATE, POTASSIUM CHLORIDE, CALCIUM CHLORIDE: 600; 310; 30; 20 INJECTION, SOLUTION INTRAVENOUS at 08:00

## 2018-04-10 RX ADMIN — EPHEDRINE SULFATE 10 MG: 50 INJECTION, SOLUTION INTRAVENOUS at 12:00

## 2018-04-10 RX ADMIN — EPHEDRINE SULFATE 20 MG: 50 INJECTION, SOLUTION INTRAVENOUS at 13:32

## 2018-04-10 RX ADMIN — Medication 10 ML: at 17:53

## 2018-04-10 RX ADMIN — PROPOFOL 25 MCG/KG/MIN: 10 INJECTION, EMULSION INTRAVENOUS at 09:31

## 2018-04-10 RX ADMIN — LIDOCAINE HYDROCHLORIDE 40 MG: 20 INJECTION, SOLUTION EPIDURAL; INFILTRATION; INTRACAUDAL; PERINEURAL at 09:16

## 2018-04-10 NOTE — H&P
HISTORY OF PRESENT ILLNESS  America Ceron is a 37 y.o. female. HPI  ESTABLISHED patient here to discuss plan of care for RIGHT breast cancer. She and her  have multiple questions about her care. Denies pain. 44 yo female here today for second opinion. She has a T1 N0 IDC Grade 2 Er/Pr+ Her 2 foreign eq right breast cancer. HER2 positive by FISH     FH: Maternal cousin diagnosed with breast cancer in her 66's.      Screening mammogram in 11/2017: BI-RADS 1. Diagnostic pablo and US done 2/1/18 at [facility]: BI-RADS 4B. 1.2 cm hypoechoic mass 10:00 upper outer quadrant. No abnormal nodes.     Breast MRI, [facility], 2/26/18 BIRADS 6. No additional disease. Nodes normal.   Review of Systems   All other systems reviewed and are negative.        Physical Exam   Pulmonary/Chest:   Exam unchanged. Nursing note and vitals reviewed.        ASSESSMENT and PLAN      ICD-10-CM ICD-9-CM     1. Malignant neoplasm of upper-outer quadrant of right breast in female, estrogen receptor positive ([facility] C50.411 174. 4       Z17.0 V86.0        36 yo female with T1 N0 IDC Grade 2 Er/Pr+ Her 2 foreign eq right breast cancer. HER2 positive by FISH  Gene panel negative for mutation. She has elected for bilateral mastectomies, right sln biopsy, reconstruction. Since her Her 2 is positive will have her see medical oncology preop for discussion of adjuvant chemo and her 2 blockade. Will get her this appointment. Will place port at surgery. She is already scheduled. I have answered all of her questions regarding postop care and activities/restrictions. They were happy with plan. Total discussion time 30 minutes.

## 2018-04-10 NOTE — PROGRESS NOTES
TRANSFER - IN REPORT:    Verbal report received from Jose F Gómez RN(name) on Indra Baker  being received from Ambulatory Surg.(unit) for routine post - op      Report consisted of patients Situation, Background, Assessment and   Recommendations(SBAR). Information from the following report(s) SBAR, Kardex, OR Summary, Procedure Summary, Intake/Output, MAR, Accordion, Recent Results and Med Rec Status was reviewed with the receiving nurse. Opportunity for questions and clarification was provided. Assessment completed upon patients arrival to unit and care assumed.

## 2018-04-10 NOTE — PROGRESS NOTES
TRANSFER - OUT REPORT:    Verbal report given to Evon(name) on [facility] Room  being transferred to [address] [place] [address](unit) for routine post - op       Report consisted of patients Situation, Background, Assessment and   Recommendations(SBAR). Information from the following report(s) SBAR, Kardex and MAR was reviewed with the receiving nurse. Lines:   Peripheral IV 04/10/18 Left Hand (Active)   Site Assessment Clean, dry, & intact 4/10/2018  1:45 PM   Phlebitis Assessment 0 4/10/2018  1:45 PM   Infiltration Assessment 0 4/10/2018  1:45 PM   Dressing Status Clean, dry, & intact 4/10/2018  1:45 PM   Dressing Type Transparent 4/10/2018  1:45 PM   Hub Color/Line Status Infusing 4/10/2018  1:45 PM        Opportunity for questions and clarification was provided.       Patient transported with:   Registered Nurse

## 2018-04-10 NOTE — OP NOTES
[address] [place]   ACUTE CARE OP NOTE    Parrish Nazario  MR#: [id number]  : 1975  ACCOUNT #: [de-identified]   DATE OF SERVICE: 04/10/2018    PREOPERATIVE DIAGNOSIS:  Right breast cancer. POSTOPERATIVE DIAGNOSIS:  Right breast cancer. PROCEDURE PERFORMED:  By Dr. Sailaja Gordon is bilateral skin-sparing mastectomies and right sentinel lymph node biopsy, Port-A-Cath placement, as well as completion right lymphadenectomy. This was all dictated in a separate note. The procedure performed by Dr. Julia Simons is placement of immediate bilateral breast reconstruction using prepectoral tissue expanders and AlloDerm x4. SURGEON:  Julia Simons MD    ASSISTANT:  OR staff    ANESTHESIA:  General.    ESTIMATED BLOOD LOSS:  For my portion is minimal.    DRAINS AND TUBES:  Rolando Valdes drain x3; 2 in the right breast and 1 in the left breast.    SPECIMENS REMOVED: none    COMPLICATIONS: none    IMPLANTS:  artoura tissue expanders      INDICATIONS:  The patient is a 70-year-old -American female who has right-sided breast cancer. She is here to have bilateral mastectomies. DESCRIPTION OF PROCEDURE:  Prior to procedure, risks and benefits were discussed with the patient. Informed consent was obtained. I saw the patient yesterday in my office and marked her and had informed consent done at that time. She was taken this morning to the operating room by Dr. Igor Worrell, who performed bilateral total mastectomies, placed a Port-A-Cath and did lymph node surgeries. Upon completion, the case was turned over to me. The chest was reprepped and draped. Following this, I began the case, starting on the right side. I placed a tissue expander in place, I measured it out and the best size fitting was a Pr-21 [place] . I marked the midline as well as the lateral where the lateral and medial suture tabs would be.   I then secured a sheet of AlloDerm along the inframammary fold with interrupted and running 2-0 PDS and did the same thing along the cranial margin. I then injected the chest wall with Exparel on both sides. Following this, I placed two drains on the right side because an axillary dissection had taken place. I then brought the 475 mL tissue expander onto the field deflated, secured it with suture tabs on the midline in the bottom part of it, as well as the medial and lateral aspects. These were secured to the chest wall with 2-0 PDS. I then secured the AlloDerm to itself, the cranial and caudal pieces with a running 2-0 PDS. This whole construct was irrigated out copiously with triple antibiotic irrigation. I then placed 2 drains and closed the mastectomy flaps in layers with 3-0 Vicryl subcuticular for the deep tissue as well as a 2-0 Stratafix for the skin. I then performed the same procedure on the left side, namely injecting the chest wall with Exparel, securing the AlloDerm along the caudal and cranial borders of the inframammary fold and the infraclavicular space with interrupted running 2-0 PDS suture, placing the tissue expander and securing to the chest wall with 2-0 PDS, closing the cranial and caudal pieces of AlloDerm with a running 2-0 PDS, placing a drain and closing in layers. Both tissue expanders were insufflated to 175 mL. Symmetry was good. Dressings were applied. The patient was then placed in a postoperative bra and brought from the operating room to the PACU in stable condition.       Bradley Ha MD       [facility].Speak  D: 04/10/2018 13:24     T: 04/10/2018 14:15  JOB #: [id number]

## 2018-04-10 NOTE — IP AVS SNAPSHOT
[address] [place] [address] 
[phone] Patient: Lenis Homans MRN: [id number] BON:[birth year] A check rachell indicates which time of day the medication should be taken. My Medications START taking these medications Instructions Each Dose to Equal  
 Morning Noon Evening Bedtime  
 cephALEXin 250 mg capsule Commonly known as:  Geoffrey Chaidez Your last dose was: Your next dose is: Take 1 Cap by mouth three (3) times daily for 10 days. Indications: tissue expanders 250 mg HYDROmorphone 2 mg tablet Commonly known as:  DILAUDID Your last dose was: Your next dose is: Take 1 Tab by mouth every four (4) hours as needed for Pain. Max Daily Amount: 12 mg.  
 2 mg CHANGE how you take these medications Instructions Each Dose to Equal  
 Morning Noon Evening Bedtime  
 propranolol LA 60 mg SR capsule Commonly known as:  INDERAL LA What changed:  when to take this Your last dose was: Your next dose is: Take 2 Caps by mouth daily. 120 mg CONTINUE taking these medications Instructions Each Dose to Equal  
 Morning Noon Evening Bedtime  
 multivitamin tablet Commonly known as:  ONE A DAY Your last dose was: Your next dose is: Take 1 Tab by mouth daily. 1 Tab STOP taking these medications   
 aspirin 325 mg tablet Commonly known as:  ASPIRIN Where to Get Your Medications These medications were sent to [address] [place] [address] 12, [address] [place] [address] Phone:  [phone]  
  cephALEXin 250 mg capsule Information on where to get these meds will be given to you by the nurse or doctor. ! Ask your nurse or doctor about these medications HYDROmorphone 2 mg tablet

## 2018-04-10 NOTE — DISCHARGE INSTRUCTIONS
Brandee Jones. Fahad Melendez M.D. POSTOPERATIVE INSTRUCTIONS    BREAST RECONSTRUCTION    1. You must have a responsible adult with you for the first 24 hours after surgery. 2. Please wear comfortable clothing the day of surgery. Do not wear clothing that pulls over your head. 3. You may start clear liquids once you have arrived home and continue until any nausea has subsided. Then you may advance to resume a normal diet. 4. Bed rest is recommended for the first 24 hours. You may be up for meals or go to the bathroom with assistance. Minimize arm extension and lifting for the first two weeks. 5. Wear the JULIETH hose until you are moving about normally, in 2-3 days. 6. Take your prescription medications as directed for the first 2-3 days. Then you may take it as needed. You have been prescribed________________________. Do not use any prescriptions with Aspirin or ibuprofen for 2 wdays. You may take non aspirin or non ibuprofen headache medications such as Tylenol Extra Strength. You may resume normal medications. 7. Do not drive or operate machinery while on pain medication. You may return to work ___________. You may drive___________. 8. Do not drink alcohol or take tranquilizers or take sleeping medications while on pain medication. 9. You may remove the bra and dressings in 24 hours and may shower. Wear the bra 24 hours a day for _____ days. You may remove it and wash and dry it and then reapply. 10. No lifting over 10 pounds for 2 weeks. No underwire bras. 11. You may find it more comfortable to sleep in a recliner for the first couple of nights. Do not sleep on your sides until your postop visit. 12. You may apply ice to the incision for the first 24 hours. If you have increasing pain and pain on one side or the other, with the feeling of tightness call the office at [phone].  You will need to return to the office for your postop appointment and suture removal. Your appointment is ___________. 14. You will be asked to begin massaging your breasts daily starting two weeks after surgery to keep the implant pockets loose and implants soft. This gives a good self exam also. Your physician will explain this procedure to you on postop visit. 15. Please call the office at [phone] if you have any questions after your surgery. 16. COMPLICATIONS:  Call the office at [phone] if any of the following occur-  Fever over 101 degrees taken by mouth or 100 degrees rectally. Pain not relieved by medications prescribed. Blood soaked dressing (small amounts of oozing may be normal.)  Unable to urinate or completely empty your bladder. Nausea or vomiting lasting longer than 24 hours. Numbness, tingling or cold fingers or toes. Swelling around incision. Increasing and progressive drainage from incision or exam site. Increased redness, warmth or hardness around incision or exam site  If you experience shortness of breath, chest pain or swelling of legs or feet, go immediately to the emergency room    17. Make sure you stay hydrated and take stool softeners while you are on the narcotic pain medications  Take a Colace daily and you may supplement with Miralax to help you have a bowel movement. Increase the miralax if you haven't had a bowel movement in 2 days    IF YOU NEED IMMEDIATE ATTENTION, GO TO [address]. I acknowledge receipt of the discharge instructions:    Patient/Guardian Signature _______________________________________ Date___________________    Tin Raring Signature_______________________________________________ Date___________________          [facility]    HOME CARE INSTRUCTIONS             You are going home with a drain called a Telly (sometimes called a J. P.). You will need to empty and measure the drainage during the first few days of recovery.   The following instructions reinforce the information you received prior to your discharge from our office-based surgical center. In the picture below, # 1 indicates where your drain is connected to the reservoir. You will not need to work with this part of the drain. See the instructions below for references to # 3 and #4. Empty the drain whenever it is close to full or at least 3 times per day  [facility] your hands prior to emptying the drain    Un-pin the drainage reservoir (the lined container) from your   clothing     Hold the reservoir with the emptying port at the top (#3)     Remove the drainage plug (#4) from the emptying port (#3)     Estimate the amount of drainage by the gradation   marks on the side of the reservoir     Record the drainage amount on the Drainage Record  on the reverse side of this form     Empty the drainage into the toilet     Squeeze the reservoir and replug it (recharge)     Remember, every time the drainage reservoir is fully expanded it is considered uncharged and it should be emptied, squeezed and replugged (recharged)     To prevent infection, avoid touching the end of the emptying port (#3) and the drainage plug (#4) with your hands (see diagram). For the drain to work it needs to be in the [address] position. Keep the area around the tube dry and covered with a dressing. The color and amount of drainage may change. When you return for your post operative visit please bring the Drainage Record       with you.   When the drainage is less than 30 - 40 cc in a 24-hour period OR when the drains have been in for 2 weeks, please call the office ([phone]) to schedule removal.  If you have any questions or concerns about your drain or the above instructions       please call our office at [address]    Patient Name:  __________________________________________________    Adolfo Pillow # 1    Date   Amount   Amount   Amount Daily Total Amount Drain # 2    Date   Amount   Amount   Amount Daily Total Amount

## 2018-04-10 NOTE — BRIEF OP NOTE
BRIEF OPERATIVE NOTE    Date of Procedure: 4/10/2018   Preoperative Diagnosis: RIGHT BREAST CANCER UPPER OUTER QUADRANT  Postoperative Diagnosis: SAME  Procedure(s):  BILATERAL SKIN SPARING MASTECTOMIES  RIGHT SLN BIOPSY  LEFT SUBCLAVIAN PORT PLACEMENT  Completion alnd right   Surgeon(s) and Role:  Panel 1:     * Elvira Taylor MD - Primary    Panel 2:     * Yaquelin Cuadra MD - Primary         Surgical Assistant: Mike Humphreys    Surgical Staff:  Circ-1: Carroll Bo RN  Radiology Technician: Kiko Guerrero RT  Registered Nurse Assistant: eBnny Crump RN  Scrub Tech-1: Keri Hernandez  Event Time In   Incision Start 4487   Incision Close 1320     Anesthesia: General   Estimated Blood Loss: 200 ml  Specimens:   ID Type Source Tests Collected by Time Destination   1 : LEFT BREAST PROPHYLACTIC MASTECTOMY Fresh Breast  Juhi Verdugo MD 4/10/2018 1037 Pathology   2 : SENTINEL NODE #1 Shay Mandujano MD 4/10/2018 1046 Pathology   3 : SENTINEL NODE #2 RIGHT AXILLA Frozen Section Lymph Node  Juhi Verdugo MD 4/10/2018 1105 Pathology   4 : RIGHT BREAST MASTECTOMY Fresh Breast  Juhi Verdugo MD 4/10/2018 1112 Pathology   5 : RIGHT AXILLARY CONTENTS Fresh Tissue  Juhi Verdugo MD 4/10/2018 1140 Pathology      Findings: 1 sln + for tumor  Complications: none  Implants:   Implant Name Type Inv.  Item Serial No.  Lot No. LRB No. Used Action   PORT VASC INFUS SET 8FR TI -- SMART PORT CT - SNONE  PORT VASC INFUS SET 8FR TI -- SMART PORT CT NONE ANGIODYNAMICS [id number] Left 1 Implanted   EXPNDR BRST TISS HP 475ML --  - [id number]  EXPNDR BRST TISS HP 475ML --  [id number] [employer] [id number] Right 1 Implanted   EXPNDR BRST TISS HP 475ML --  - [id number]  EXPNDR BRST TISS HP 475ML --  [id number] [employer] [id number] Left 1 Implanted   GRAFT TISS ALLODERM PERF MED -- 9.6X19.3CM 132SQ CM - [id number]  GRAFT TISS ALLODERM PERF MED -- 9.6X19.3CM 132SQ CM [id number] Alley Wilson [id number] Right 1 Implanted   GRAFT TISS ALLODERM PERF MED -- 9.6X19.3CM 132SQ CM - [id number]  GRAFT TISS ALLODERM PERF MED -- 9.6X19.3CM 132SQ CM [id number] FlowBelow Aero [id number] Right 1 Implanted   GRAFT TISS ALLODERM PERF MED -- 9.6X19.3CM 132SQ CM - [id number]  GRAFT TISS ALLODERM PERF MED -- 9.6X19.3CM 132SQ CM [id number] FlowBelow Aero [id number] Right 1 Implanted   GRAFT TISS ALLODERM PERF MED -- 9.6X19.3CM 132SQ CM - [id number]   GRAFT TISS ALLODERM PERF MED -- 9.6X19.3CM 132SQ CM [id number] FlowBelow Aero [id number] Left 1 Implanted       Dictated [id number]

## 2018-04-10 NOTE — ANESTHESIA POSTPROCEDURE EVALUATION
Post-Anesthesia Evaluation and Assessment    Patient: Ramone Dobbs MRN: [id number]  SSN: [id number]    YOB: 1975  Age: 37 y.o. Sex: female       Cardiovascular Function/Vital Signs  Visit Vitals    /68    Pulse 74    Temp 36.4 °C (97.6 °F)    Resp 10    Ht 5' (1.524 m)    Wt (P) 51.7 kg (114 lb)    SpO2 100%    BMI (P) 22.26 kg/m2       Patient is status post general anesthesia for Procedure(s):  BILATERAL BREAST MASTECTOMIES, RIGHT BREAST SENTINEL NODE BIOPSY (SN 4-9 3:00), BILATERAL PRE-PEC VS SUB-PEC TISSUE EXPANDERS AND ALLODERM WITH DERMAL MATRIX, PORTACATH INSERTION  SENTINEL NODE BIOPSY  INFUSAPORT INSERTION  BREAST RECONSTRUCTION. Nausea/Vomiting: None    Postoperative hydration reviewed and adequate. Pain:  Pain Scale 1: Numeric (0 - 10) (04/10/18 0740)  Pain Intensity 1: 0 (04/10/18 0740)   Managed    Neurological Status:   Neuro (WDL): Within Defined Limits (04/10/18 0826)   At baseline    Mental Status and Level of Consciousness: Arousable    Pulmonary Status:   O2 Device: Nasal cannula;Nasal airway (04/10/18 1350)   Adequate oxygenation and airway patent    Complications related to anesthesia: None    Post-anesthesia assessment completed.  No concerns    Signed By: Faby Orta DO     April 10, 2018

## 2018-04-10 NOTE — OP NOTES
[address]   ACUTE CARE OP NOTE    Ciro Ramirez  MR#: [id number]  : 1975  ACCOUNT #: [de-identified]   DATE OF SERVICE: 04/10/2018    PREOPERATIVE DIAGNOSIS:  Right breast cancer, upper outer quadrant. POSTOPERATIVE DIAGNOSIS:  Right breast cancer, upper outer quadrant. PROCEDURES PERFORMED:  Bilateral skin-sparing mastectomies, right sentinel lymph node biopsy, left subclavian port placement, completion axillary lymph node dissection on the right. SURGEON:  Mary Gonzalez MD.      ASSISTANT:  Margarita Keller. ANESTHESIA:  General.    ESTIMATED BLOOD LOSS:  200 mL. SPECIMENS REMOVED:  1. Left breast prophylactic mastectomy. 2.  Right axillary sentinel node #1.  3.  Right axillary sentinel node #2.  4.  Right breast mastectomy. 5.  Right axillary contents. FINDINGS:  One sentinel lymph node positive for tumor. COMPLICATIONS:  None. IMPLANTS:  Per Dr. Susi Ramirez. INDICATIONS FOR PROCEDURE:  This is a 45-year-old female who was found to have a right breast T1N0 cancer that was HER2 positive. She was evaluated by medical oncology. Preoperative decision was made to give adjuvant HER2 blockade and chemotherapy. The patient opted to have mastectomies with reconstruction. She was scheduled for left subclavian port placement, bilateral skin-sparing mastectomies and right sentinel node biopsy. PROCEDURE IN DETAIL:  The patient initially went to nuclear medicine, where technetium 99 was injected into the right breast.  She tolerated this well. She went to the preop holding area, where surgical site was marked by surgeons and informed consent was obtained. She was taken to the operating room and laid in supine position, where general endotracheal anesthesia was induced. A Nuno catheter was placed without complication. A timeout was performed.   The patient's arms were tucked in preparation for the port to be done first.  This was prepped and draped in a sterile manner. The patient was placed in Trendelenburg position. The left axillary vein was accessed with an 18 gauge introducer needle. This syringe was removed and the wire was threaded through the needle, shown to go toward the SVC and right atrium on fluoroscopy. A 15 blade was used to make an incision at the wire. Bovie cautery was used to create a port pocket. The dilator sheath was threaded over the wire. The inner cannula and wire were removed. The port catheter was clamped and placed into the chest wall at 18 cm. This was cut and placed on the port, and the port was placed in the port pocket. The tip of the port was at the junction of SVC and right atrium. Next, the port was secured on either side with 3-0 Prolene. The injectable saline aspirated and flushed well through the port as well as the final heparin solution flush. The incision was closed with interrupted 3-0 Vicryl and 4-0 subcuticular Monocryl, and Dermabond was placed on the skin. The patient was then undraped, and arms were placed down and she was reprepped and draped for the mastectomies and reconstructive part of the procedure. Attention was turned to a left prophylactic mastectomy, and a Wheeler pattern incision was made with a 10 blade around the nipple areolar complex. A plasma blade was used to dissect superior, medial, inferior and lateral skin flaps. The breast was removed in total from the chest wall in a medial to lateral fashion using Bovie cautery. This was marked short superior and long stitch lateral.  Attention was turned now to the right axilla, where an inferior axillary hairline incision was made with a 10 blade. Bovie cautery was used to dissect down to the axillary fascia. [place] node #1 was identified at this point using Neoprobe guidance and found to be negative. Next, attention was turned to the right breast and a Wise pattern incision was made with a 10 blade.   Bovie cautery was used to dissect superior, medial, inferior and lateral skin flaps. The breast was removed in total from the chest wall in a medial to lateral fashion. This was marked short superior and long stitch lateral.  While doing the lateral skin flap dissection, I came across what was palpated to almost feel like a lymph node and had some radioactivity in it and I excised this with a LigaSure and sent this for frozen section labeled sentinel node #2, and it was found to be positive. Attention was turned to the right axilla, and a completion lymphadenectomy was performed as follows:  Bovie cautery was used to dissect free the pectoralis minor and latissimus muscle. Blunt dissection was used to identify the axillary vein. The thoracodorsal bundle and long thoracic nerve were identified and preserved. Axillary contents were removed using a LigaSure device. A moist lap was packed in each breast cavity in preparation for Dr. Gunnar Gan portion of the procedure. All sponge, needle and instrument counts were correct. The patient went to recovery in stable condition.       MD MARTINEZ Ahuja / MN  D: 04/10/2018 14:17     T: 04/10/2018 15:36  JOB #: [id number]

## 2018-04-10 NOTE — PERIOP NOTES
Patient: Caty Zamudio MRN: [id number]  SSN: [id number]   YOB: 1975  Age: 37 y.o. Sex: female     Patient is status post Procedure(s):  BILATERAL BREAST MASTECTOMIES, RIGHT BREAST SENTINEL NODE BIOPSY (SN 4-9 3:00), BILATERAL PRE-PEC VS SUB-PEC TISSUE EXPANDERS AND ALLODERM WITH DERMAL MATRIX, PORTACATH INSERTION  SENTINEL NODE BIOPSY  INFUSAPORT INSERTION  BREAST RECONSTRUCTION.     Surgeon(s) and Role:  Panel 1:     * Jone Huffman MD - Primary    Panel 2:     * Remy Dey MD - Primary    Local/Dose/Irrigation:  [facility]                  Peripheral IV 04/10/18 Left Hand (Active)            Airway - Endotracheal Tube 04/10/18 Oral (Active)                   Dressing/Packing:  Wound Chest Left-DRESSING TYPE: Topical skin adhesive/glue (04/10/18 0900)  Splint/Cast:  ]    Other:

## 2018-04-10 NOTE — ANESTHESIA PREPROCEDURE EVALUATION
Anesthetic History   No history of anesthetic complications            Review of Systems / Medical History  Patient summary reviewed, nursing notes reviewed and pertinent labs reviewed    Pulmonary  Within defined limits                 Neuro/Psych   Within defined limits           Cardiovascular    Hypertension        Dysrhythmias            GI/Hepatic/Renal  Within defined limits              Endo/Other        Cancer     Other Findings              Physical Exam    Airway  Mallampati: II  TM Distance: > 6 cm  Neck ROM: normal range of motion   Mouth opening: Normal     Cardiovascular  Regular rate and rhythm,  S1 and S2 normal,  no murmur, click, rub, or gallop             Dental  No notable dental hx       Pulmonary  Breath sounds clear to auscultation               Abdominal  GI exam deferred       Other Findings            Anesthetic Plan    ASA: 2  Anesthesia type: general          Induction: Intravenous  Anesthetic plan and risks discussed with: Patient

## 2018-04-10 NOTE — BRIEF OP NOTE
BRIEF OPERATIVE NOTE    Date of Procedure: 4/10/2018   Preoperative Diagnosis: RIGHT BREAST CANCER  Postoperative Diagnosis: RIGHT BREAST CANCER    Procedure(s):  BILATERAL BREAST MASTECTOMIES, RIGHT BREAST SENTINEL NODE BIOPSY (SN 4-9 3:00), BILATERAL PRE-PEC VS SUB-PEC TISSUE EXPANDERS AND ALLODERM WITH DERMAL MATRIX, PORTACATH INSERTION  SENTINEL NODE BIOPSY  INFUSAPORT INSERTION  BREAST RECONSTRUCTION  Surgeon(s) and Role:  Panel 1:     * Narcisa Lambert MD - Primary    Panel 2:     * Liz Rees MD - Primary         Assistant Staff: None      Surgical Staff:  Circ-1: Ashley Murrieta RN  Radiology Technician: Satya Fagan RT  Registered Nurse Assistant: Sarah Key RN  Scrub Tech-1: [employer]  Event Time In   Incision Start 0421   Incision Close      Anesthesia: General   Estimated Blood Loss: minimal  Specimens:   ID Type Source Tests Collected by Time Destination   1 : LEFT BREAST PROPHYLACTIC MASTECTOMY Fresh Breast  Narcisa Lambert MD 4/10/2018 1037 Pathology   2 : SENTINEL NODE #1 RIGHT AXILLA Frozen Section Lymph Node  Narcisa Lambert MD 4/10/2018 1046 Pathology   3 : SENTINEL NODE #2 RIGHT AXILLA Frozen Section Lymph Node  Narcisa Lambert MD 4/10/2018 1105 Pathology   4 : RIGHT BREAST MASTECTOMY Fresh Breast  Narcisa Lambert MD 4/10/2018 1112 Pathology   5 : Abram Kidd MD 4/10/2018 1140 Pathology      Findings: well perfused skin flaps   Complications: none  Implants:   Implant Name Type Inv.  Item Serial No.  Lot No. LRB No. Used Action   PORT VASC INFUS SET 8FR TI -- SMART PORT CT - SNONE  PORT VASC INFUS SET 8FR TI -- SMART PORT CT NONE ANGIODYNAMICS [id number] Left 1 Implanted   EXPNDR BRST TISS HP 475ML --  - [id number]  EXPNDR BRST TISS HP 475ML --  [id number] [employer] [id number] Right 1 Implanted   EXPNDR BRST TISS HP 475ML --  - [id number]  EXPNDR BRST TISS HP 475ML --  [id number] [employer] [id number] Left 1 Implanted GRAFT TISS ALLODERM PERF MED -- 9.6X19.3CM 132SQ CM - [id number]  GRAFT TISS ALLODERM PERF MED -- 9.6X19.3CM 132SQ CM [id number] Folkstr [id number] Right 1 Implanted   GRAFT TISS ALLODERM PERF MED -- 9.6X19.3CM 132SQ CM - [id number]  GRAFT TISS ALLODERM PERF MED -- 9.6X19.3CM 132SQ CM [id number] Folkstr [id number] Right 1 Implanted   GRAFT TISS ALLODERM PERF MED -- 9.6X19.3CM 132SQ CM - [id number]  GRAFT TISS ALLODERM PERF MED -- 9.6X19.3CM 132SQ CM [id number] Folkstr [id number] Right 1 Implanted   GRAFT TISS ALLODERM PERF MED -- 9.6X19.3CM 132SQ CM - [id number]   GRAFT TISS ALLODERM PERF MED -- 9.6X19.3CM 132SQ CM [id number] Folkstr [id number] Left 1 Implanted     475 cc tissue exp filled to 175cc

## 2018-04-11 VITALS
DIASTOLIC BLOOD PRESSURE: 70 MMHG | TEMPERATURE: 98.4 F | HEIGHT: 60 IN | BODY MASS INDEX: 22.38 KG/M2 | WEIGHT: 114 LBS | OXYGEN SATURATION: 100 % | SYSTOLIC BLOOD PRESSURE: 111 MMHG | RESPIRATION RATE: 18 BRPM | HEART RATE: 95 BPM

## 2018-04-11 PROCEDURE — 99218 HC RM OBSERVATION: CPT

## 2018-04-11 PROCEDURE — 74011000258 HC RX REV CODE- 258: Performed by: PLASTIC SURGERY

## 2018-04-11 PROCEDURE — 77030012890

## 2018-04-11 PROCEDURE — 74011250636 HC RX REV CODE- 250/636: Performed by: PLASTIC SURGERY

## 2018-04-11 PROCEDURE — 74011250637 HC RX REV CODE- 250/637: Performed by: SURGERY

## 2018-04-11 RX ORDER — CEPHALEXIN 250 MG/1
250 CAPSULE ORAL 3 TIMES DAILY
Qty: 30 CAP | Refills: 0 | Status: SHIPPED | OUTPATIENT
Start: 2018-04-11 | End: 2018-04-21

## 2018-04-11 RX ORDER — HYDROMORPHONE HYDROCHLORIDE 2 MG/1
2 TABLET ORAL
Qty: 50 TAB | Refills: 0 | Status: SHIPPED | OUTPATIENT
Start: 2018-04-11 | End: 2019-04-08 | Stop reason: ALTCHOICE

## 2018-04-11 RX ADMIN — HYDROMORPHONE HYDROCHLORIDE 2 MG: 2 TABLET ORAL at 07:29

## 2018-04-11 RX ADMIN — SODIUM CHLORIDE 1000 MG: 900 INJECTION, SOLUTION INTRAVENOUS at 05:02

## 2018-04-11 NOTE — PROGRESS NOTES
I have reviewed discharge instructions with the patient and spouse. The patient and spouse verbalized understanding. Pt. Spouse demonstrated emptying of JAZZMINE drains. All questions reviewed and answered.

## 2018-04-11 NOTE — PROGRESS NOTES
Bedside and Verbal shift change report given to [place] (oncoming nurse) by Angelina (offgoing nurse). Report included the following information SBAR, Kardex and Intake/Output.

## 2018-04-11 NOTE — PROGRESS NOTES
Bedside and Verbal shift change report given to KARISSA Alfaro (oncoming nurse) by Kayla Paul RN (offgoing nurse). Report included the following information SBAR, Kardex, Procedure Summary, Intake/Output, MAR, Accordion, Recent Results and Med Rec Status.

## 2018-04-23 ENCOUNTER — OFFICE VISIT (OUTPATIENT)
Dept: SURGERY | Age: 43
End: 2018-04-23

## 2018-04-23 VITALS
HEIGHT: 60 IN | BODY MASS INDEX: 22.38 KG/M2 | DIASTOLIC BLOOD PRESSURE: 48 MMHG | HEART RATE: 100 BPM | WEIGHT: 114 LBS | SYSTOLIC BLOOD PRESSURE: 112 MMHG

## 2018-04-23 DIAGNOSIS — C50.411 MALIGNANT NEOPLASM OF UPPER-OUTER QUADRANT OF RIGHT BREAST IN FEMALE, ESTROGEN RECEPTOR POSITIVE (HCC): Primary | ICD-10-CM

## 2018-04-23 DIAGNOSIS — C50.411 MALIGNANT NEOPLASM OF UPPER-OUTER QUADRANT OF RIGHT FEMALE BREAST, UNSPECIFIED ESTROGEN RECEPTOR STATUS (HCC): ICD-10-CM

## 2018-04-23 DIAGNOSIS — Z17.0 MALIGNANT NEOPLASM OF UPPER-OUTER QUADRANT OF RIGHT BREAST IN FEMALE, ESTROGEN RECEPTOR POSITIVE (HCC): Primary | ICD-10-CM

## 2018-04-23 NOTE — PROGRESS NOTES
HISTORY OF PRESENT ILLNESS  Threeatt I Joice Osgood is a 37 y.o. female. HPI   ESTABLISHED patient here today for post op follow up. S/p BILATERAL mastectomy, RIGHT SLNB, tissue expanders by Dr. Marshal Rock and port-a-cath insertion on 4/10/18. Denies any breast or incisional problems. She saw Dr. Marshal Rock today and he removed 3 out of her 3 drains. She will follow up with him again on 4/30/18 to have expanders filled. She has also met with Dr. Zackery Loco and follows up with her on 5/1/18. She had her ECHO at [facility] on [address] History of breast cancer-   RIGHT T1 N0 IDC Grade 2 Er/Pr+ Her 2 foreign eq right breast cancer. HER2 positive by [facility]  4/10/18- BILATERAL mastectomy, RIGHT SLNB, tissue expanders and port insertion  Followed by Dr. Zackery Loco - med onc    FINAL PATHOLOGIC DIAGNOSIS from 4/10/18-   1. [place] node #1 right axilla, sentinel lymph node biopsy:   One benign lymph node with dermatopathic lymphadenopathy, no tumor seen (0/1)   2. Left breast, prophylactic mastectomy:   Benign nipple, no histopathologic changes   Benign breast tissue and skin   3. Right axilla sentinel node #2, sentinel lymph node biopsy:   Infiltrating duct carcinoma, Modified Estevez Atkins Grade 2 of 3 (Tubules=3, Nuclei=3, Mitoses= 1= 7) in the tail of the breast with adjacent normal breast tissue, and dense stromal lymphocytic infiltrate (see comment)   Tumor = 1.2 cm in greatest dimension within tail of breast tissue   Surgical margins are indeterminate   BREAST, INVASIVE CARCINOMA: SYNOPTIC REPORT   CLINICAL   Clinical History: Palpable mass   SPECIMEN   Procedure: Total mastectomy   Specimen Laterality: Right   TUMOR   Histologic Type:  Invasive carcinoma of no special type (ductal, not otherwise specified)   Glandular (Acinar) / Tubular Differentiation: Score 3   Nuclear Pleomorphism: Score 3   Mitotic Rate: Score 1 (<=3 mitoses per mm2)   Overall Grade: Grade 2 (scores of 6 or 7)   Tumor Size: 12 x 9 Millimeters (mm) Ductal Carcinoma In Situ (DCIS): Not identified   Tumor Extent   Nipple DCIS: DCIS does not involve the nipple epidermis   Accessory Findings   Treatment Effect in the Breast: No definite response to presurgical therapy in the invasive carcinoma   Treatment Effect in the Lymph Nodes: No definite response to presurgical therapy in metastatic carcinoma   MARGINS   Invasive Carcinoma Margins: Cannot be assessed   LYMPH NODES   Regional Lymph Nodes: Uninvolved by tumor cells   Number of Lymph Nodes Examined: 3   Number of [place] Nodes Examined: 1   PATHOLOGIC STAGE CLASSIFICATION (pTNM, AJCC 8th Edition)   Primary Tumor (Invasive Carcinoma) (pT): pT1c   Regional Lymph Nodes (pN)   Category (pN): pN0   4. Right breast, mastectomy:   Fibrocystic changes   Small fibroadenoma   Benign nipple, no histopathologic changes   Deep margin, no tumor seen   5. Right axillary contents, axillary dissection:   Two benign lymph nodes, no tumor seen (0/2)   Comment   Due to the dense lymphoid infiltrate, the presence of intermingled benign breast lobules, no definitive lymph node architecture, and a biopsy clip, the specimen labelled Right Axilla [place] Node #2 is most consistent with a primary ductal carcinoma arising in the tail of the breast, and not a lymph node. Due to the nature of the specimen, margins cannot be determined from this sample. Clinical and radiologic correlation is recommended. The case is discussed with Dr. Lucila Greenberg on 4/13/2018 at approximately 12:00pm.    FH: Maternal cousin diagnosed with breast cancer in her 66's. Patient has had genetic testing- negative    Review of Systems   All other systems reviewed and are negative. Physical Exam   Pulmonary/Chest:       Bilateral incisions c/d/i. Expanders intact. Port site healing well. Nursing note and vitals reviewed.       ASSESSMENT and PLAN    ICD-10-CM ICD-9-CM    1. Malignant neoplasm of upper-outer quadrant of right breast in female, estrogen receptor positive (HCC) C50.411 174.4     Z17.0 V86.0    2. Malignant neoplasm of upper-outer quadrant of right female breast, unspecified estrogen receptor status (HCC) C50.411 174.4    History of breast cancer-   RIGHT T1 N0 IDC Grade 2 Er/Pr+ Her 2 foreign eq right breast cancer. HER2 positive by [facility]  4/10/18- BILATERAL mastectomy, RIGHT SLNB, tissue expanders and port insertion  Followed by Dr. Giovanni Juarez - med onc    - healing well. - she has f/u appt with Dr. Giovanni Juarez to start chemo/herceptin  - no radiation needed  - f/u with me 3 months.

## 2018-04-23 NOTE — PATIENT INSTRUCTIONS
Managing Side Effects of Chemotherapy: Care Instructions  Your Care Instructions    Cancer is often treated with medicines that destroy the cancer cells (chemotherapy). These medicines may slow cancer growth and prevent or stop the spread of cancer. Chemotherapy also can affect healthy cells and cause side effects. Most people can work and do their normal activities after and even during chemotherapy, but they may need to limit their schedules. Side effects of chemotherapy may include nausea and vomiting, loss of appetite, pain, and being tired. Some medicines can cause diarrhea or mouth sores. Your doctor may prescribe medicines to treat the side effects. Your doctor will advise you to take extra care to prevent illnesses and infections, because chemotherapy weakens your natural defenses. Follow-up care is a key part of your treatment and safety. Be sure to make and go to all appointments, and call your doctor if you are having problems. It's also a good idea to know your test results and keep a list of the medicines you take. How can you care for yourself at home? Medicines  ? · Take your medicines exactly as prescribed. Call your doctor if you think you are having a problem with your medicine. You may get medicine for nausea and vomiting if you have these side effects. ?Nausea and vomiting  ? · A light meal or snack before chemotherapy may help prevent nausea. If you do have nausea during your treatment, try eating earlier-at least an hour or two before your next treatment. After your treatment, you may want to wait one or more hours before you eat again. ? · Drink fluids with your meals and an hour before or after meals. ? · After vomiting has stopped for 1 hour, sip a rehydration drink, such as Powerade or Gatorade. ? · Drink plenty of fluids to prevent dehydration. Choose water and other caffeine-free clear liquids until you feel better.  Try clear fluids, such as apple or grape juice mixed to half strength with water, rehydration drinks, weak tea with sugar, clear broth, and gelatin dessert. Do not drink citrus juices. If you have kidney, heart, or liver disease and have to limit fluids, talk with your doctor before you increase the amount of fluids you drink. ? · When you are feeling better, begin eating clear soups and mild foods until all symptoms are gone for 12 to 48 hours. Other good choices include dry toast, crackers, cooked cereal, and gelatin dessert, such as Jell-O.   ? · If your vomiting is not getting better or is getting worse, call your doctor right away. ? Loss of appetite  ? · It's important to eat healthy food. If you do not feel like eating, try to eat food that has protein and extra calories to keep up your strength and prevent weight loss. You can drink liquid meal replacements for extra calories and protein. ? · Try eating several smaller meals throughout the day. Set a schedule for meals and snacks, and plan for times when it feels best to eat. Try to eat your main meal early. ? · After treatment, you may want to wait for a while to eat. You can also try eating earlier before treatment. ? · Try to eat more of the foods you like during the days and times when your appetite is good. ? · When you don't feel like eating your normal foods, try clear broths/soups and mild foods like toast, crackers, cooked cereal like oatmeal, and gelatin dessert. Eating soft, bland foods may help. ?Pain control  ? · If your doctor prescribes medicines to control pain, take them as directed. Often your doctor will have you take these medicines regularly to keep your pain under control. Medicine for pain may cause side effects. Let your doctor know if you feel constipated, have trouble urinating, or have nausea. ? · Try using relaxation exercises to lower your anxiety and stress, which can increase pain. ? · Keep track of your pain so you can tell your doctor what your pain is like.  Write down where you feel pain, how long it lasts, what seems to bring it on, and how it feels. Also note what makes the pain feel better or worse. ? · If you have mouth pain, your doctor may prescribe a special mouth rinse that can help relieve the pain. ?Weakness and feeling tired  ? · Get extra rest. Plan ahead so you can take breaks or naps. ? · Save your energy for the most important things you want to do. ? · Try to get some exercise, such as walking, but stop if you are too tired. ? · Eat a balanced diet. Do not skip meals, especially breakfast.   ? · Do something you enjoy. Do you like to listen to music? Spend some time listening to your favorite music. Or find another way to relax by reading, watching a movie, or playing games. ? · Ask family and friends to help with home chores and other tasks. ? To prevent infections  ? · Wash your hands often during the day, especially before you eat and after you use the bathroom. ? · Stay away from people who have illnesses that you might catch, such as the flu or a cold. ? · Try to stay out of crowds. ? · Clean cuts and scrapes right away with warm water and soap. Clean them daily until they are healed. ? · Keep track of your temperature, if your doctor recommends it. You can do this by taking your temperature at regular times and writing it down. ? Hair loss  ? · Use a mild shampoo and a soft hair brush. ? · Use a low setting on your hair dryer. Do not color or perm your hair. ? · Have your hair cut short. It will look thicker and garvin, and it will not be such a shock if you lose hair. ? · Use sunscreen and a hat, scarf, or turban to protect your scalp from the sun. ? · Ask your doctor about other treatments that you may try to prevent or minimize hair loss. These may include the use of a cooling cap. When should you call for help? Call 911 anytime you think you may need emergency care.  For example, call if:  ? · You passed out (lost consciousness). ?Call your doctor now or seek immediate medical care if:  ? · You have a fever. ? · You have abnormal bleeding. ? · You have new or worse pain. ? · You think you have an infection. ? · You have new symptoms, such as a cough, belly pain, vomiting, diarrhea, or a rash. ? Watch closely for changes in your health, and be sure to contact your doctor if:  ? · You are much more tired than usual.   ? · You have swollen glands in your armpits, groin, or neck. ? · You do not get better as expected. Where can you learn more? Go to http://keily-tyson.info/. Enter [phone] in the search box to learn more about \"Managing Side Effects of Chemotherapy: Care Instructions. \"  Current as of: May 12, 2017  Content Version: 11.4  © 8309-2195 [employer]. Care instructions adapted under license by [employer] (which disclaims liability or warranty for this information). If you have questions about a medical condition or this instruction, always ask your healthcare professional. [address] any warranty or liability for your use of this information.

## 2018-07-23 ENCOUNTER — OFFICE VISIT (OUTPATIENT)
Dept: SURGERY | Age: 43
End: 2018-07-23

## 2018-07-23 VITALS
DIASTOLIC BLOOD PRESSURE: 72 MMHG | SYSTOLIC BLOOD PRESSURE: 115 MMHG | WEIGHT: 115 LBS | HEART RATE: 86 BPM | BODY MASS INDEX: 22.58 KG/M2 | HEIGHT: 60 IN

## 2018-07-23 DIAGNOSIS — Z90.13 S/P MASTECTOMY, BILATERAL: ICD-10-CM

## 2018-07-23 DIAGNOSIS — C50.411 MALIGNANT NEOPLASM OF UPPER-OUTER QUADRANT OF RIGHT BREAST IN FEMALE, ESTROGEN RECEPTOR POSITIVE (HCC): Primary | ICD-10-CM

## 2018-07-23 DIAGNOSIS — Z17.0 MALIGNANT NEOPLASM OF UPPER-OUTER QUADRANT OF RIGHT BREAST IN FEMALE, ESTROGEN RECEPTOR POSITIVE (HCC): Primary | ICD-10-CM

## 2018-07-23 DIAGNOSIS — C50.411 MALIGNANT NEOPLASM OF UPPER-OUTER QUADRANT OF RIGHT FEMALE BREAST, UNSPECIFIED ESTROGEN RECEPTOR STATUS (HCC): ICD-10-CM

## 2018-10-03 ENCOUNTER — OFFICE VISIT (OUTPATIENT)
Dept: NEUROLOGY | Age: 43
End: 2018-10-03

## 2018-10-03 VITALS
HEIGHT: 60 IN | SYSTOLIC BLOOD PRESSURE: 128 MMHG | HEART RATE: 74 BPM | DIASTOLIC BLOOD PRESSURE: 62 MMHG | WEIGHT: 127 LBS | BODY MASS INDEX: 24.94 KG/M2 | OXYGEN SATURATION: 97 %

## 2018-10-03 DIAGNOSIS — R00.2 PALPITATIONS: ICD-10-CM

## 2018-10-03 DIAGNOSIS — G43.709 CHRONIC MIGRAINE WITHOUT AURA WITHOUT STATUS MIGRAINOSUS, NOT INTRACTABLE: ICD-10-CM

## 2018-10-03 DIAGNOSIS — R20.0 LEFT LEG NUMBNESS: ICD-10-CM

## 2018-10-03 DIAGNOSIS — Z90.13 S/P MASTECTOMY, BILATERAL: Primary | ICD-10-CM

## 2018-10-03 DIAGNOSIS — R20.0 FACIAL NUMBNESS: ICD-10-CM

## 2018-10-03 DIAGNOSIS — G43.009 MIGRAINE WITHOUT AURA AND WITHOUT STATUS MIGRAINOSUS, NOT INTRACTABLE: ICD-10-CM

## 2018-10-03 RX ORDER — PROPRANOLOL HYDROCHLORIDE 60 MG/1
120 CAPSULE, EXTENDED RELEASE ORAL DAILY
Qty: 60 CAP | Refills: 11 | Status: SHIPPED | OUTPATIENT
Start: 2018-10-03 | End: 2019-02-20 | Stop reason: SDUPTHER

## 2018-10-03 NOTE — PROGRESS NOTES
Name: Venancio Yeboah    Chief Complaint: migraines  Diagnosed with breast cancer. Had a double mastectomy. The left mastectomy was elective. She underwent chemotherapy. She is currently on chemotherapy. She did not undergo radiation. She did get a some symptoms of neuropathy in her distal extremities but this has improved with time. She has no weakness. She has some fatigue. She sees her cardiologist every three months for an echo but no specific treatment has been advised. She  has no new symptoms to address today. Migraines are under good control. Assesment and Plan    1. Intractable chronic migraine without aura and without status migrainosus  proponalol - continue    2. Fluttering heart  Advised to remain vigilant    3. Breast cancer  Currently on chemotherapy  S/p double mastectomy. 4. Sensory loss  Not an issue. Allergies  Review of patient's allergies indicates no known allergies. Medications  Current Outpatient Prescriptions   Medication Sig    HYDROmorphone (DILAUDID) 2 mg tablet Take 1 Tab by mouth every four (4) hours as needed for Pain. Max Daily Amount: 12 mg.    multivitamin (ONE A DAY) tablet Take 1 Tab by mouth daily.  propranolol LA (INDERAL LA) 60 mg SR capsule Take 2 Caps by mouth daily. (Patient taking differently: Take 120 mg by mouth nightly.)     No current facility-administered medications for this visit. Medical History  Past Medical History:   Diagnosis Date    Arrhythmia     PALPITATIONS    Cancer ([facility] [address] 2018    BREAST, Right    Gestational diabetes     Headache     Hypertension     Ill-defined condition     MIGRAINES    Palpitations     Prediabetes      Review of Systems   Constitutional: Positive for malaise/fatigue. Negative for chills and fever. HENT: Negative for ear pain. Eyes: Negative for pain and discharge. Respiratory: Negative for cough and hemoptysis. Cardiovascular: Negative for chest pain and claudication. Gastrointestinal: Negative for constipation and diarrhea. Genitourinary: Negative for flank pain and hematuria. Musculoskeletal: Negative for back pain and myalgias. Skin: Negative for itching and rash. Neurological: Positive for sensory change and headaches. Endo/Heme/Allergies: Negative for environmental allergies. Does not bruise/bleed easily. Psychiatric/Behavioral: Negative for depression and hallucinations. Exam:    Visit Vitals    /62    Pulse 74    Ht 5' (1.524 m)    Wt 127 lb (57.6 kg)    SpO2 97%    BMI 24.8 kg/m2      General: Well developed, well nourished. Patient in no apparent distress   Head: Normocephalic, atraumatic, anicteric sclera   Neck Normal ROM, No thyromegally   Lungs:  Clear to auscultation bilaterally, No wheezes or rubs   Cardiac: Regular rate and rhythm with no murmurs. Abd: Bowel sounds were audible. No tenderness on palpation   Ext: No pedal edema   Skin: Supple no rash     NeurologicExam:  Mental Status: Alert and oriented to person place and time   Speech: Fluent no aphasia or dysarthria. Cranial Nerves:  II - XII Inact   Motor:  Full and symmetric strength of upper and lower proximal and distal muscles. Normal bulk and tone. Reflexes:   Deep tendon reflexes 2+/4 and symmetric. Sensory:   Symmetric and intact with no perceived deficits modalities involving small or large fibers. Gait:  Gait is balanced and fluid with normal arm swing. Tremor:   No tremor noted. Cerebellar:  Coordination intact. Neurovascular: No carotid bruits.  No JVD

## 2018-10-03 NOTE — MR AVS SNAPSHOT
[address], 
[address], [address] [facility] 
[phone] Patient: Meredith Lambert MRN: [id number] GMI:[birth year] Visit Information Date & Time Provider Department Dept. Phone Encounter #  
 10/3/2018 11:40 AM Dwayne Jo MD [facility] [phone] [id number] Follow-up Instructions Return in about 1 year (around 10/3/2019) for migraine. Your Appointments 1/21/2019  3:55 PM  
Follow Up with Hafsa Amado MD  
[address] [facility]) Appt Note: 6 month f/u cp$ 7/23/18 TT  
 [address] [facility] [address]  
  
   
 [address] Upcoming Health Maintenance Date Due Pneumococcal 19-64 Highest Risk (1 of 3 - PCV13) 3/5/1994 DTaP/Tdap/Td series (1 - Tdap) 3/5/1996 PAP AKA CERVICAL CYTOLOGY 3/5/1996 Influenza Age 5 to Adult 8/1/2018 Allergies as of 10/3/2018  Review Complete On: 10/3/2018 By: Dwayne Jo MD  
 No Known Allergies Current Immunizations  Never Reviewed No immunizations on file. Not reviewed this visit You Were Diagnosed With   
  
 Codes Comments S/P mastectomy, bilateral    -  Primary ICD-10-CM: Z90.13 ICD-9-CM: V45.71 Chronic migraine without aura without status migrainosus, not intractable     ICD-10-CM: A62.430 ICD-9-CM: 346.70 Palpitations     ICD-10-CM: R00.2 ICD-9-CM: 785.1 Migraine without aura and without status migrainosus, not intractable     ICD-10-CM: C63.606 ICD-9-CM: 346.10 Facial numbness     ICD-10-CM: R20.0 ICD-9-CM: 782.0 Left leg numbness     ICD-10-CM: R20.0 ICD-9-CM: 734. 0 Vitals BP Pulse Height(growth percentile) Weight(growth percentile) SpO2 BMI  
 128/62 74 5' (1.524 m) 127 lb (57.6 kg) 97% 24.8 kg/m2 OB Status Smoking Status Having regular periods Never Smoker Vitals History BMI and BSA Data Body Mass Index Body Surface Area  
 24.8 kg/m 2 1.56 m 2 Preferred Pharmacy Pharmacy Name Phone [facility] [address] [facility] [phone] Your Updated Medication List  
  
   
This list is accurate as of 10/3/18 12:51 PM.  Always use your most recent med list.  
  
  
  
  
 HYDROmorphone 2 mg tablet Commonly known as:  DILAUDID Take 1 Tab by mouth every four (4) hours as needed for Pain. Max Daily Amount: 12 mg.  
  
 multivitamin tablet Commonly known as:  ONE A DAY Take 1 Tab by mouth daily. propranolol LA 60 mg SR capsule Commonly known as:  INDERAL LA Take 2 Caps by mouth daily. Prescriptions Sent to Pharmacy Refills  
 propranolol LA (INDERAL LA) 60 mg SR capsule 11 Sig: Take 2 Caps by mouth daily. Class: Normal  
 Pharmacy: [facility] [address] [facility] [address] [address] [facility] #: [phone] Route: Oral  
  
Follow-up Instructions Return in about 1 year (around 10/3/2019) for migraine. To-Do List   
 02/07/2019 2:00 PM  
  Appointment with [facility] PAT EXAM RM 4 at [address] [facility] ([phone]) Patient Instructions Office Policies · Phone calls/patient messages: Please allow up to 24 hours for someone in the office to contact you about your call or message. Be mindful your provider may be out of the office or your message may require further review. We encourage you to use [email or web address] for your messages as this is a faster, more efficient way to communicate with our office · Medication Refills: 
Prescription medications require up to 48 business hours to process. We encourage you to use [email or web address] for your refills. For controlled medications: Please allow up to 72 business hours to process. Certain medications may require you to  a written prescription at our office. NO narcotic/controlled medications will be prescribed after 4pm Monday through Friday or on weekends · Form/Paperwork Completion: 
Please note there is a $25 fee for all paperwork completed by our providers. We ask that you allow 7-14 business days. Pre-payment is due prior to picking up/faxing the completed form. You may also download your forms to [employer] to have your doctor print off. Introducing \[facility] [place] [facility]\"" & HEALTH SERVICES! [facility] introduces [employer] patient portal. Now you can access parts of your medical record, email your doctor's office, and request medication refills online. 1. In your internet browser, go to [email or web address] 2. Click on the First Time User? Click Here link in the Sign In box. You will see the New Member Sign Up page. 3. Enter your [employer] Access Code exactly as it appears below. You will not need to use this code after youve completed the sign-up process. If you do not sign up before the expiration date, you must request a new code. · [employer] Access Code: [email or web address] Expires: 10/21/2018  3:36 PM 
 
4. Enter the last four digits of your Social Security Number (xxxx) and Date of Birth (mm/dd/yyyy) as indicated and click Submit. You will be taken to the next sign-up page. 5. Create a [employer] ID. This will be your [employer] login ID and cannot be changed, so think of one that is secure and easy to remember. 6. Create a [employer] password. You can change your password at any time. 7. Enter your Password Reset Question and Answer. This can be used at a later time if you forget your password. 8. Enter your e-mail address. You will receive e-mail notification when new information is available in [address] 9. Click Sign Up. You can now view and download portions of your medical record. 10. Click the Download Summary menu link to download a portable copy of your medical information. If you have questions, please visit the Frequently Asked Questions section of the [employer] website. Remember, [email or web address] is NOT to be used for urgent needs. For medical emergencies, dial 911. Now available from your iPhone and Android! Please provide this summary of care documentation to your next provider. Your primary care clinician is listed as POLLO Tavarez. If you have any questions after today's visit, please call [phone].

## 2018-10-03 NOTE — PATIENT INSTRUCTIONS

## 2018-10-03 NOTE — PROGRESS NOTES
Name: Nikole Salvador    Chief Complaint: migraines    Mrs Brandi Back returns for a follow up visit. Since last being seen she  has been compliant with medications. No new health conditions have arisen. No new medications have been  prescribed. She  has no new symptoms to address today. Migraines are under good control. Assesment and Plan    1. Intractable chronic migraine without aura and without status migrainosus  proponalol    2. Fluttering heart  Advised to remain vigilant      Allergies  Review of patient's allergies indicates no known allergies. Medications  Current Outpatient Prescriptions   Medication Sig    HYDROmorphone (DILAUDID) 2 mg tablet Take 1 Tab by mouth every four (4) hours as needed for Pain. Max Daily Amount: 12 mg.    multivitamin (ONE A DAY) tablet Take 1 Tab by mouth daily.  propranolol LA (INDERAL LA) 60 mg SR capsule Take 2 Caps by mouth daily. (Patient taking differently: Take 120 mg by mouth nightly.)     No current facility-administered medications for this visit. Medical History  Past Medical History:   Diagnosis Date    Arrhythmia     PALPITATIONS    Cancer ([facility] [address] 2018    BREAST, Right    Gestational diabetes     Headache     Hypertension     Ill-defined condition     MIGRAINES    Palpitations     Prediabetes      Review of Systems   Constitutional: Negative for chills and fever. HENT: Negative for ear pain. Eyes: Negative for pain and discharge. Respiratory: Negative for cough and hemoptysis. Cardiovascular: Negative for chest pain and claudication. Gastrointestinal: Negative for constipation and diarrhea. Genitourinary: Negative for flank pain and hematuria. Musculoskeletal: Negative for back pain and myalgias. Skin: Negative for itching and rash. Neurological: Negative for headaches. Endo/Heme/Allergies: Negative for environmental allergies. Does not bruise/bleed easily.    Psychiatric/Behavioral: Negative for depression and hallucinations. Exam:    Visit Vitals    Ht 5' (1.524 m)      General: Well developed, well nourished. Patient in no apparent distress   Head: Normocephalic, atraumatic, anicteric sclera   Neck Normal ROM, No thyromegally   Lungs:  Clear to auscultation bilaterally, No wheezes or rubs   Cardiac: Regular rate and rhythm with no murmurs. Abd: Bowel sounds were audible. No tenderness on palpation   Ext: No pedal edema   Skin: Supple no rash     NeurologicExam:  Mental Status: Alert and oriented to person place and time   Speech: Fluent no aphasia or dysarthria. Cranial Nerves:  II - XII Inact   Motor:  Full and symmetric strength of upper and lower proximal and distal muscles. Normal bulk and tone. Reflexes:   Deep tendon reflexes 2+/4 and symmetric. Sensory:   Symmetric and intact with no perceived deficits modalities involving small or large fibers. Gait:  Gait is balanced and fluid with normal arm swing. Tremor:   No tremor noted. Cerebellar:  Coordination intact. Neurovascular: No carotid bruits.  No JVD

## 2018-11-06 ENCOUNTER — DOCUMENTATION ONLY (OUTPATIENT)
Dept: ONCOLOGY | Age: 43
End: 2018-11-06

## 2018-11-06 ENCOUNTER — TELEPHONE (OUTPATIENT)
Dept: ONCOLOGY | Age: 43
End: 2018-11-06

## 2018-11-06 NOTE — TELEPHONE ENCOUNTER
I Unable to reach her today, but left message describing what is included in the survivorship care plan and encouraging her to call me with any questions she has once she has reviewed the information or to set up a more formal opportunity to go over this and receive additional resources--in a clinic visit or by phone  Also, encouraged her to take this with her for review with her doctors if she prefers. Left my contact phone #s in message for her return call now or when she has received the packet.

## 2018-12-04 ENCOUNTER — PATIENT OUTREACH (OUTPATIENT)
Dept: ONCOLOGY | Age: 43
End: 2018-12-04

## 2018-12-04 NOTE — PROGRESS NOTES
[employer] Cancer Survivorship Program 
 
 
Voicemail message from patient. She had received information from Ulises Flores NP regarding her SCP and had not received the paperwork. Patient requests a call to discuss and determine when to expect her copy of SCP. Chart reviewed NP left message for patient 11/8/18. Incomplete SCP noted in chart. Reached out to patient by phone. Left voicemail message for her explaining role and that SCP not yet complete. I assured her I would complete it and mail it to her this week. Directed patient to take SCP with her to her upcoming appointment with Dr. Obey Aguirre or Dr. Cintia Gallegos to review. Encouraged her to contact me with any further questions.

## 2019-01-18 ENCOUNTER — PATIENT OUTREACH (OUTPATIENT)
Dept: ONCOLOGY | Age: 44
End: 2019-01-18

## 2019-01-18 NOTE — PROGRESS NOTES
[facility]   Cancer Survivorship Program      Returned patient's call. She is anxious to get her SCP and information about nutrition for cancer patients. She has not completed Herceptin therapy with Dr. Wilmar Vicente yet (will complete in April). Patient is feeling well and managing work and treatment well. Patient will see Dr. Yudy Yañez next week and follows with Dr. Wilmar Vicente through Herceptin treatments. She agrees to call me once therapy is completed or if she needs resources or support. Encouraged patient to connect with PCP as they will be a resource for her with preventative care and follow up. She verbalized understanding. We also discussed online resources that are listed on her SCP. I will mail her SCP and nutrition resources.

## 2019-01-18 NOTE — PROGRESS NOTES
This Survivorship Care Plan is a cancer treatment summary and follow up plan and is provided to you to keep with your health plan records and to share with your primary care provider or any of your doctors and nurses. This summary is a brief record of major aspects of your cancer treatment and not a detailed or comprehensive record of your care. You should review this with your cancer provider. General Information   Patient Name: Yari Pina   Patient [facility] Providers (Including Names, Institution)   Primary Care Erica Pineda MD   Surgeon: Irma Zepeda MD, [id number] Oncologist: Lashay Almeida MD  [facility]   Other Providers:  Rosi Brunson MD    Treatment Summary   Diagnosis   Cancer Type/Histology Subtype:  Right Invasive Ductal Carcinoma---Breast Cancer  Receptors: ?Estrogen positive; ? Progesterone Positive; ?HER2 positive by FISH Diagnosis Date (year):  2018   Stage: I (T1 N0)    Treatment Completed   Surgery: Yes Surgery Date(s) (year): 2018   Surgical procedure/findings: BILATERAL SKIN SPARING MASTECTOMIES  RIGHT [place] Lymph Node BIOPSY  LEFT SUBCLAVIAN PORT PLACEMENT  Completion axillary lymph node dissection right   Lymph node removal: ? Axillary Dissection ? [place] Biopsy   Systemic Therapy (chemotherapy, hormonal therapy, other): Yes   ? After surgery   Names of Agents Used End Dates (year)   ? Paclitaxel  2018   ? Trastuzumab  Completes in 4/2019       Treatment Ongoing   Additional treatment name Planned duration Possible Side effects   ? Tamoxifen  Hot flashes and vaginal discharge (common); endometrial cancer, serious blood clots and eye problems (all very rare). Other rare side effects may occur.    Persistent symptoms or side effects at completion of treatment:  Fatigue: No                                                                   Menopausal symptoms: No  Numbness:No Pain:No  Psychosocial/Depression: No                                           Other (enter type(s)):joint achiness     Familial Cancer Risk Assessment   Breast and or ovarian cancer in 1st or 2nd degree relatives: Yes   Received Genetic counseling: No   Genetic testing:  Yes  Genetic testing results: Negative       Follow-up Care Plan   Your follow-up care plan is design to inform you and primary care providers regarding the recommended and required follow-up, cancer screening and routine health maintenance that is needed to maintain optimal health. Possible late- and long-term effects that someone with this type of cancer and treatment may experience:  Weakening of the heart presenting as shortness of breath and swelling of legs (rare < 5%); and bones become weak and at risk for fracture (osteoporosis). It is important to remember that these symptoms can be due to other causes like diabetes or with normal aging. If these or any other new symptoms occur bring these to attention of your health care provider. These symptoms should be brought to the attention of your provider:   1. Anything that represents a brand new symptom;  2. Anything that represents a persistent symptom;  3. Anything you are worried about that might be related to the cancer coming back. Please continue to see your primary care provider for all general health care recommended for a woman your age such as routine immunizations, and routine non-breast cancer screening like colonoscopy or bone density exams. Consult with your health care provider about prevention and screening for bone loss using bone density tests.    Schedule for Clinical Visits   Coordinating Provider When/How often   Vernell Morelos Ongoing with Herceptin will complete in April   Dr. Jeromy Kan  1/21/19 and as directed by her       Cancer Surveillance Or Other Recommended Tests   Coordinating Provider TEST How often   Dr. Jeromy Kan Mammogram Annually    MRI breast As indicated by provider   PCP/Gyn Pap/pelvic exam As indicated by provider   PCP/GI Colonoscopy As indicated by provider   Dr. Zelaya Agent Every 2 years if on an aromatase inhibitor or as indicated by your provider   Breast cancer survivors may experience issues with the areas listed below. If you have any concerns in these or other areas, please speak with your doctors or nurses to find out how you can get help with them. Anxiety or depression   Insurance     Sexual Functioning  Emotional and mental health  Memory or concentration loss         Stopping Smoking   Fatigue    Parenting     Weight changes   Fertility                Physical functioning    Other   Financial advice or assistance  School/work       A number of lifestyle/behaviors can affect your ongoing health, including the risk for the cancer coming back or developing another cancer. Discuss these recommendations with your doctor or nurse:  Alcohol use    Physical activity               Other  Diet     Sun screen use      Management of my medications  Tobacco use/cessation       Management of my other illnesses  Weight management (loss/gain)                              Resources you may be interested in:      www.cancer. net   Other: cancercare. org       Other comments:   Prepared by:   Jose Manuel Lehman RN , OCN                                                                                      Delivered on: 1/18/19

## 2019-01-21 ENCOUNTER — OFFICE VISIT (OUTPATIENT)
Dept: SURGERY | Age: 44
End: 2019-01-21

## 2019-01-21 VITALS
SYSTOLIC BLOOD PRESSURE: 148 MMHG | HEIGHT: 60 IN | DIASTOLIC BLOOD PRESSURE: 88 MMHG | BODY MASS INDEX: 24.94 KG/M2 | WEIGHT: 127 LBS | HEART RATE: 89 BPM

## 2019-01-21 DIAGNOSIS — Z85.3 HISTORY OF BREAST CANCER: Primary | ICD-10-CM

## 2019-01-21 RX ORDER — TAMOXIFEN CITRATE 20 MG/1
20 TABLET ORAL
COMMUNITY
Start: 2019-01-11

## 2019-01-21 NOTE — PATIENT INSTRUCTIONS

## 2019-01-21 NOTE — PROGRESS NOTES
HISTORY OF PRESENT ILLNESS Ramone Dobbs is a 37 y.o. female. HPI 
 
ROS Physical Exam 
 
ASSESSMENT and PLAN 
{ASSESSMENT/PLAN:75153}

## 2019-01-21 NOTE — PROGRESS NOTES
HISTORY OF PRESENT ILLNESS  America Kong is a 37 y.o. female. HPI   ESTABLISHED patient here today for follow up RIGHT breast cancer. Denies any breast concerns at this time. She is having surgery with Dr. Radha Green on 2/19/19 for removal and replacement of bilateral breast. She completes Herceptin in 4/2019. She is currently taking Tamoxifen and tolerating well. History of breast cancer-   T1 N0 IDC Grade 2 Er/Pr+ Her 2 foreign eq right breast cancer. Camella Blade positive by [facility]  4/2018- bilateral mastectomy, RIGHT SLNB with reconstruction and port insertion  Continuing chemotherapy. Completes Herceptin in 4/2019. Currently taking Tamoxifen. Followed by Dr. Em Carrasquillo. FH: Maternal cousin diagnosed with breast cancer in her 66's. Patient has had genetic testing- negative. Review of Systems   All other systems reviewed and are negative. Physical Exam   Pulmonary/Chest:       Bilateral expanders intact  No masses  Port intact   Nursing note and vitals reviewed. ASSESSMENT and PLAN    ICD-10-CM ICD-9-CM    1.  History of breast cancer Z85.3 V10.3      - doing well  - spoke with dr. Radha Green who will delay recon until herceptin finished so can remove port  - f/u 6 months

## 2019-02-20 DIAGNOSIS — G43.009 MIGRAINE WITHOUT AURA AND WITHOUT STATUS MIGRAINOSUS, NOT INTRACTABLE: ICD-10-CM

## 2019-02-20 NOTE — TELEPHONE ENCOUNTER
Received refill request for propranolol from [facility]   Last filled 10/3/18   Dr. Tirado Wilsey please refill

## 2019-02-21 RX ORDER — PROPRANOLOL HYDROCHLORIDE 60 MG/1
120 CAPSULE, EXTENDED RELEASE ORAL DAILY
Qty: 180 CAP | Refills: 3 | Status: SHIPPED | OUTPATIENT
Start: 2019-02-21 | End: 2019-10-16 | Stop reason: SDUPTHER

## 2019-02-25 ENCOUNTER — TELEPHONE (OUTPATIENT)
Dept: NEUROLOGY | Age: 44
End: 2019-02-25

## 2019-04-08 ENCOUNTER — OFFICE VISIT (OUTPATIENT)
Dept: INTERNAL MEDICINE CLINIC | Age: 44
End: 2019-04-08

## 2019-04-08 VITALS
SYSTOLIC BLOOD PRESSURE: 120 MMHG | TEMPERATURE: 98.4 F | BODY MASS INDEX: 25.8 KG/M2 | WEIGHT: 131.4 LBS | HEART RATE: 78 BPM | RESPIRATION RATE: 18 BRPM | DIASTOLIC BLOOD PRESSURE: 80 MMHG | HEIGHT: 60 IN | OXYGEN SATURATION: 100 %

## 2019-04-08 DIAGNOSIS — R51.9 NONINTRACTABLE HEADACHE, UNSPECIFIED CHRONICITY PATTERN, UNSPECIFIED HEADACHE TYPE: ICD-10-CM

## 2019-04-08 DIAGNOSIS — I10 ESSENTIAL HYPERTENSION: Primary | Chronic | ICD-10-CM

## 2019-04-08 NOTE — PROGRESS NOTES
Rosita Brown is a 40 y.o. female presenting for Elevated Blood Pressure Tiffany Carter 1. Have you been to the ER, urgent care clinic since your last visit? Hospitalized since your last visit? Yes-4/2018 double mastectomy- [facility] 2. Have you seen or consulted any other health care providers outside of the [address] [place] [address] since your last visit? Include any pap smears or colon screening. Dr Jenni Marroquin- Oncology, Dr Lavinia Zapata- Surgeon, Dr Merline Schmid No flowsheet data found. Abuse Screening Questionnaire 9/22/2016 Do you ever feel afraid of your partner? Myrtle Torres Are you in a relationship with someone who physically or mentally threatens you? Myrtle Torres Is it safe for you to go home? Y  
 
 
3 most recent PHQ Screens 9/22/2016 Little interest or pleasure in doing things Not at all Feeling down, depressed, irritable, or hopeless Not at all Total Score PHQ 2 0 There are no discontinued medications.

## 2019-04-08 NOTE — PROGRESS NOTES
This note will not be viewable in [address] Subjective:  
 
Mrs. Dalia Kelly presents to the office today as a walk-in after almost a 3-year absence for blood pressure evaluation. The patient has a history of labile hypertension. In addition she has migraine headaches and is followed by neurology. She currently takes propranolol on a twice a day basis which has helped prevent a lot of her migraine headaches. The patient notes that her blood pressure will fluctuate quite a bit especially when seen in doctor's offices. She follows a no added salt diet and her body mass index is close to 25. The patient denies any current headaches, numbness, tingling or focal neurological problems. She does note a family history of hypertension in her mother and in her maternal grandmother. Past Medical History:  
Diagnosis Date  Arrhythmia PALPITATIONS  Cancer ([address] 2018 BREAST, Right  Gestational diabetes  Headache   
 Headache 4/8/2019  Hypertension  Ill-defined condition MIGRAINES  
 Palpitations  Prediabetes Past Surgical History:  
Procedure Laterality Date  HX BREAST BIOPSY Right 02/2018  HX BREAST RECONSTRUCTION Bilateral 4/10/2018 BREAST RECONSTRUCTION performed by Angel Garcia MD at [address] [place] [address] HX HEENT    
 [address]  HX MASTECTOMY Bilateral 4/10/2018 BILATERAL BREAST MASTECTOMIES, RIGHT BREAST SENTINEL NODE BIOPSY (SN 4-9 3:00), BILATERAL PRE-PEC VS SUB-PEC TISSUE EXPANDERS AND ALLODERM WITH DERMAL MATRIX, PORTACATH INSERTION performed by Brandan Choe MD at [place] [address] No Known Allergies Current Outpatient Medications Medication Sig Dispense Refill  propranolol LA (INDERAL LA) 60 mg SR capsule Take 2 Caps by mouth daily. 180 Cap 3  
 tamoxifen (NOLVADEX) 20 mg tablet Take 20 mg by mouth daily.  multivitamin (ONE A DAY) tablet Take 1 Tab by mouth daily. Social History Socioeconomic History  Marital status:  Spouse name: Not on file  Number of children: Not on file  Years of education: Not on file  Highest education level: Not on file Tobacco Use  Smoking status: Never Smoker  Smokeless tobacco: Never Used Substance and Sexual Activity  Alcohol use: No  
 Drug use: No  
 
Family History Problem Relation Age of Onset  Diabetes Mother  Asthma Mother  Hypertension Mother  Diabetes Father  Heart Disease Father  Glaucoma Father  No Known Problems Sister  Diabetes Brother  Hypertension Brother  No Known Problems Sister  Diabetes Brother  Hypertension Brother  Breast Cancer Other   
     70's  Anesth Problems Neg Hx Review of Systems: 
GEN: no weight loss, weight gain, fatigue or night sweats CV: no PND, orthopnea, or palpitations Resp: no dyspnea on exertion, no cough Abd: no nausea, vomiting or diarrhea EXT: denies edema, claudication Endocrine: no hair loss, excessive thirst or polyuria Neurological ROS: no TIA or stroke symptoms ROS otherwise negative Objective:  
 
Visit Vitals /80 Pulse 78 Temp 98.4 °F (36.9 °C) (Oral) Resp 18 Ht 5' (1.524 m) Wt 131 lb 6.4 oz (59.6 kg) SpO2 100% BMI 25.66 kg/m² Body mass index is 25.66 kg/m². General:   alert, cooperative and no distress Eyes: conjunctivae/sclerae clear. PERRL, EOM's intact Mouth:  No oral lesions, no pharyngeal erythema, no exudates Neck: Trachea midline, no thyromegaly, no bruits Heart: S1 and S2 normal,no murmurs noted Lungs: Clear to auscultation bilaterally, no increased work of breathing Abdomen: Soft, nontender. Normal bowel sounds Extremities: No edema or cyanosis Neuro: ..alert, oriented x3,speech normal in context and clarity, cranial nerves II-XII intact,motor strength: full proximally and distally,gait: normal 
reflexes: full and symmetric Physical exam otherwise negative Assessment/Plan:  
 
Diagnoses and all orders for this visit: 
 
Essential hypertension Nonintractable headache, unspecified chronicity pattern, unspecified headache type Other instructions: The patient's medications were reviewed and reconciled. No change in her current medical regimen is made. She is taking propranolol and most likely this is helping to control her blood pressure as well. No added salt diet is enforced She is to return in early May as she has appointment for a checkup at that time. Follow-up and Dispositions · Return for As previously scheduled.  
  
 
 
Shannon Chavez MD

## 2019-04-08 NOTE — PATIENT INSTRUCTIONS
DASH Diet: Care Instructions Your Care Instructions The DASH diet is an eating plan that can help lower your blood pressure. DASH stands for Dietary Approaches to Stop Hypertension. Hypertension is high blood pressure. The DASH diet focuses on eating foods that are high in calcium, potassium, and magnesium. These nutrients can lower blood pressure. The foods that are highest in these nutrients are fruits, vegetables, low-fat dairy products, nuts, seeds, and legumes. But taking calcium, potassium, and magnesium supplements instead of eating foods that are high in those nutrients does not have the same effect. The DASH diet also includes whole grains, fish, and poultry. The DASH diet is one of several lifestyle changes your doctor may recommend to lower your high blood pressure. Your doctor may also want you to decrease the amount of sodium in your diet. Lowering sodium while following the DASH diet can lower blood pressure even further than just the DASH diet alone. Follow-up care is a key part of your treatment and safety. Be sure to make and go to all appointments, and call your doctor if you are having problems. It's also a good idea to know your test results and keep a list of the medicines you take. How can you care for yourself at home? Following the DASH diet · Eat 4 to 5 servings of fruit each day. A serving is 1 medium-sized piece of fruit, ½ cup chopped or canned fruit, 1/4 cup dried fruit, or 4 ounces (½ cup) of fruit juice. Choose fruit more often than fruit juice. · Eat 4 to 5 servings of vegetables each day. A serving is 1 cup of lettuce or raw leafy vegetables, ½ cup of chopped or cooked vegetables, or 4 ounces (½ cup) of vegetable juice. Choose vegetables more often than vegetable juice. · Get 2 to 3 servings of low-fat and fat-free dairy each day. A serving is 8 ounces of milk, 1 cup of yogurt, or 1 ½ ounces of cheese. · Eat 6 to 8 servings of grains each day. A serving is 1 slice of bread, 1 ounce of dry cereal, or ½ cup of cooked rice, pasta, or cooked cereal. Try to choose whole-grain products as much as possible. · Limit lean meat, poultry, and fish to 2 servings each day. A serving is 3 ounces, about the size of a deck of cards. · Eat 4 to 5 servings of nuts, seeds, and legumes (cooked dried beans, lentils, and split peas) each week. A serving is 1/3 cup of nuts, 2 tablespoons of seeds, or ½ cup of cooked beans or peas. · Limit fats and oils to 2 to 3 servings each day. A serving is 1 teaspoon of vegetable oil or 2 tablespoons of salad dressing. · Limit sweets and added sugars to 5 servings or less a week. A serving is 1 tablespoon jelly or jam, ½ cup sorbet, or 1 cup of lemonade. · Eat less than 2,300 milligrams (mg) of sodium a day. If you limit your sodium to 1,500 mg a day, you can lower your blood pressure even more. Tips for success · Start small. Do not try to make dramatic changes to your diet all at once. You might feel that you are missing out on your favorite foods and then be more likely to not follow the plan. Make small changes, and stick with them. Once those changes become habit, add a few more changes. · Try some of the following: ? Make it a goal to eat a fruit or vegetable at every meal and at snacks. This will make it easy to get the recommended amount of fruits and vegetables each day. ? Try yogurt topped with fruit and nuts for a snack or healthy dessert. ? Add lettuce, tomato, cucumber, and onion to sandwiches. ? Combine a ready-made pizza crust with low-fat mozzarella cheese and lots of vegetable toppings. Try using tomatoes, squash, spinach, broccoli, carrots, cauliflower, and onions. ? Have a variety of cut-up vegetables with a low-fat dip as an appetizer instead of chips and dip. ? Sprinkle sunflower seeds or chopped almonds over salads.  Or try adding chopped walnuts or almonds to cooked vegetables. ? Try some vegetarian meals using beans and peas. Add garbanzo or kidney beans to salads. Make burritos and tacos with mashed sandoval beans or black beans. Where can you learn more? Go to http://keily-tyson.info/. Enter I407 in the search box to learn more about \"DASH Diet: Care Instructions. \" Current as of: July 22, 2018 Content Version: 11.9 © 9070-1079 [employer]. Care instructions adapted under license by [employer] (which disclaims liability or warranty for this information). If you have questions about a medical condition or this instruction, always ask your healthcare professional. [address] any warranty or liability for your use of this information.

## 2019-05-01 ENCOUNTER — OFFICE VISIT (OUTPATIENT)
Dept: INTERNAL MEDICINE CLINIC | Age: 44
End: 2019-05-01

## 2019-05-01 VITALS
OXYGEN SATURATION: 100 % | WEIGHT: 131 LBS | TEMPERATURE: 98.5 F | SYSTOLIC BLOOD PRESSURE: 110 MMHG | HEART RATE: 76 BPM | HEIGHT: 60 IN | BODY MASS INDEX: 25.72 KG/M2 | DIASTOLIC BLOOD PRESSURE: 80 MMHG

## 2019-05-01 DIAGNOSIS — I10 ESSENTIAL HYPERTENSION: Chronic | ICD-10-CM

## 2019-05-01 DIAGNOSIS — R51.9 NONINTRACTABLE HEADACHE, UNSPECIFIED CHRONICITY PATTERN, UNSPECIFIED HEADACHE TYPE: ICD-10-CM

## 2019-05-01 DIAGNOSIS — C50.411 MALIGNANT NEOPLASM OF UPPER-OUTER QUADRANT OF RIGHT BREAST IN FEMALE, ESTROGEN RECEPTOR POSITIVE (HCC): ICD-10-CM

## 2019-05-01 DIAGNOSIS — Z00.00 ROUTINE PHYSICAL EXAMINATION: Primary | ICD-10-CM

## 2019-05-01 DIAGNOSIS — Z17.0 MALIGNANT NEOPLASM OF UPPER-OUTER QUADRANT OF RIGHT BREAST IN FEMALE, ESTROGEN RECEPTOR POSITIVE (HCC): ICD-10-CM

## 2019-05-01 DIAGNOSIS — Z90.13 S/P MASTECTOMY, BILATERAL: ICD-10-CM

## 2019-05-01 LAB
A-G RATIO,AGRAT: 1.4 RATIO
ALBUMIN SERPL-MCNC: 4.3 G/DL (ref 3.9–5.4)
ALP SERPL-CCNC: 44 U/L (ref 38–126)
ALT SERPL-CCNC: 20 U/L (ref 9–52)
ANION GAP SERPL CALC-SCNC: 17 MMOL/L
AST SERPL W P-5'-P-CCNC: 27 U/L (ref 14–36)
BILIRUB SERPL-MCNC: 0.6 MG/DL (ref 0.2–1.3)
BILIRUB UR QL: NEGATIVE
BUN SERPL-MCNC: 11 MG/DL (ref 7–17)
BUN/CREATININE RATIO,BUCR: 12 RATIO
CALCIUM SERPL-MCNC: 10 MG/DL (ref 8.4–10.2)
CHLORIDE SERPL-SCNC: 104 MMOL/L (ref 98–107)
CHOL/HDL RATIO,CHHD: 3 RATIO (ref 0–4)
CHOLEST SERPL-MCNC: 156 MG/DL (ref 0–200)
CLARITY: CLEAR
CO2 SERPL-SCNC: 23 MMOL/L (ref 22–32)
COLOR UR: ABNORMAL
CREAT SERPL-MCNC: 0.9 MG/DL (ref 0.7–1.2)
ERYTHROCYTE [DISTWIDTH] IN BLOOD BY AUTOMATED COUNT: 14.3 %
GLOBULIN,GLOB: 3
GLUCOSE 24H UR-MRATE: NEGATIVE G/(24.H)
GLUCOSE SERPL-MCNC: 98 MG/DL (ref 65–105)
HCT VFR BLD AUTO: 40.8 % (ref 37–51)
HDLC SERPL-MCNC: 55 MG/DL (ref 35–130)
HGB BLD-MCNC: 13.9 G/DL (ref 12–18)
HGB UR QL STRIP: ABNORMAL
KETONES UR QL STRIP.AUTO: NEGATIVE
LDL/HDL RATIO,LDHD: 1 RATIO
LDLC SERPL CALC-MCNC: 81 MG/DL (ref 0–130)
LEUKOCYTE ESTERASE: NEGATIVE
LYMPHOCYTES ABSOLUTE: 1.8 K/UL (ref 0.6–4.1)
LYMPHOCYTES NFR BLD: 25.9 % (ref 10–58.5)
MCH RBC QN AUTO: 31.7 PG (ref 26–32)
MCHC RBC AUTO-ENTMCNC: 34.1 G/DL (ref 30–36)
MCV RBC AUTO: 92.9 FL (ref 80–97)
MONOCYTES ABS-DIF,2141: 0.5 K/UL (ref 0–1.8)
MONOCYTES NFR BLD: 7.3 % (ref 0.1–24)
NEUTROPHILS # BLD: 66.8 % (ref 37–92)
NEUTROPHILS ABS,2156: 4.7 K/UL (ref 2–7.8)
NITRITE UR QL STRIP.AUTO: NEGATIVE
PH UR STRIP: 6 [PH] (ref 5–7)
PLATELET # BLD AUTO: 174 K/UL (ref 140–440)
PMV BLD AUTO: 9.8 FL
POTASSIUM SERPL-SCNC: 4.3 MMOL/L (ref 3.6–5)
PROT SERPL-MCNC: 7.3 G/DL (ref 6.3–8.2)
PROT UR STRIP-MCNC: NEGATIVE MG/DL
RBC # BLD AUTO: 4.39 M/UL (ref 4.2–6.3)
RBC #/AREA URNS HPF: ABNORMAL #/HPF
SODIUM SERPL-SCNC: 144 MMOL/L (ref 137–145)
SP GR UR REFRACTOMETRY: 1.01 (ref 1–1.03)
SQUAMOUS EPITHELIAL CELLS: ABNORMAL
TRIGL SERPL-MCNC: 98 MG/DL (ref 0–200)
UROBILINOGEN UR QL STRIP.AUTO: NEGATIVE
VLDLC SERPL CALC-MCNC: 20 MG/DL
WBC # BLD AUTO: 7.1 K/UL (ref 4.1–10.9)
WBC URNS QL MICRO: ABNORMAL #/HPF

## 2019-05-01 NOTE — PROGRESS NOTES
Subjective: This note will not be viewable in [address] 40 y.o. female for annual Comprehensive personal healthcare examination. Mrs. Vanessa Chase is a 68-year-old  female mother of 1 child who works at [employer] in [place]. She presents to the office today for complete checkup. The patient has a history of hypertension. It has been somewhat labile. She was seen a month ago and blood pressure was normal and she states her blood pressure was normal a week ago when she saw her gynecologist.  She is on propranolol LA 60 mg which she takes for migraine headache prevention. Patient denies any palpitations, headaches, numbness, tingling or focal neurological problems. The patient also has a history of right sided breast cancer for which she has undergone bilateral mastectomy with reconstruction. She is followed by Dr. Sreedhar Lee who she last saw in April and currently is on tamoxifen. She has had no recurrence of disease. History of present illness: This patient has multiple medical problems. These include:  
Patient Active Problem List  
Diagnosis Code  Fluttering heart I49.8  Malignant neoplasm of upper-outer quadrant of right breast in female, estrogen receptor positive ([facility] [address] C50.411, Z17.0  Breast cancer of upper-outer quadrant of right female breast ([address] C50.411  S/P mastectomy, bilateral Z90.13  
 Hypertension I10  
 Headache R51 Past Medical History:  
Diagnosis Date  Arrhythmia PALPITATIONS  Cancer ([address] 2018 BREAST, Right  Gestational diabetes  Headache   
 Headache 4/8/2019  Hypertension  Ill-defined condition MIGRAINES  
 Palpitations  Prediabetes Past Surgical History:  
Procedure Laterality Date  HX BREAST BIOPSY Right 02/2018  HX BREAST RECONSTRUCTION Bilateral 4/10/2018 BREAST RECONSTRUCTION performed by Jayce Randall MD at [address] HX MASTECTOMY Bilateral 4/10/2018 BILATERAL BREAST MASTECTOMIES, RIGHT BREAST SENTINEL NODE BIOPSY (SN 4-9 3:00), BILATERAL PRE-PEC VS SUB-PEC TISSUE EXPANDERS AND ALLODERM WITH DERMAL MATRIX, PORTACATH INSERTION performed by Timmie Aase, MD at [address] HX WISDOM TEETH EXTRACTION No Known Allergies Current Outpatient Medications Medication Sig Dispense Refill  propranolol LA (INDERAL LA) 60 mg SR capsule Take 2 Caps by mouth daily. 180 Cap 3  
 tamoxifen (NOLVADEX) 20 mg tablet Take 20 mg by mouth daily.  multivitamin (ONE A DAY) tablet Take 1 Tab by mouth daily. Social History Socioeconomic History  Marital status:  Spouse name: Not on file  Number of children: 1  Years of education: Not on file  Highest education level: Not on file Occupational History  Occupation:  Tobacco Use  Smoking status: Never Smoker  Smokeless tobacco: Never Used Substance and Sexual Activity  Alcohol use: No  
 Drug use: No  
 
Family History Problem Relation Age of Onset  Diabetes Mother  Asthma Mother  Hypertension Mother  Diabetes Father  Heart Disease Father  Glaucoma Father  No Known Problems Sister  Diabetes Brother  Hypertension Brother  No Known Problems Sister  Diabetes Brother  Hypertension Brother  Breast Cancer Other   
     70's  Anesth Problems Neg Hx Health Maintenance Topic Date Due  
 DTaP/Tdap/Td series (1 - Tdap) 03/05/1996  PAP AKA CERVICAL CYTOLOGY  03/05/1996  Influenza Age 5 to Adult  08/01/2019  Pneumococcal 0-64 years  Aged Out Review of Systems Constitutional:  She denies fever, weight loss, sweats or fatigue. HEENT:  No blurred or double vision, headache or dizziness. No difficulty with swallowing, taste, speech or smell. Respiratory:  No cough, wheezing or shortness of breath. No sputum production. Cardiac:  Denies chest pain, palpitations, unexplained indigestion, syncope, edema, PND or orthopnea. GI:  No changes in bowel movements, no abdominal pain, no bloating, anorexia, nausea, vomiting or heartburn. :  No frequency or dysuria. Denies incontinence. Extremities:  No joint pain, stiffness or swelling. Skin:  No recent rashes or mole changes. Neurological:  No numbness, tingling, burning paresthesias or loss of motor strength. No syncope, dizziness, frequent headaches or memory loss. ROS otherwise negative Objective:  
 
Vitals:  
 05/01/19 1406 05/01/19 1431 BP: (!) 138/96 110/80 Pulse: 76 Temp: 98.5 °F (36.9 °C) TempSrc: Oral   
SpO2: 100% Weight: 131 lb (59.4 kg) Height: 5' (1.524 m) PainSc:   0 - No pain Body mass index is 25.58 kg/m². Physical Examination:  
General Appearance:  Well-developed, well-nourished, no acute distress. Vision:  Deferred to ophthalmologist.      
HEENT:   
Ears:  The TMs and ear canals were clear. Eyes:  The pupillary responses were normal.  Extraocular muscle function intact. Lids and conjunctiva not injected. No conjunctiva redness or drainage. Pharynx:  Clear with teeth in good repair. No masses were noted. Neck:  Supple without thyromegaly or adenopathy. No JVD noted. No carotid bruits. Lungs:  Clear to auscultation and percussion. Cardiac:  Regular rate and rhythm without murmur. PMI not displaced. No gallop, rub or click. Abdomen:  Flat, soft, non-tender without palpable organomegaly or mass. No pulsatile mass was felt, and no bruit was heard. Bowel sounds were active. Breasts:  Deferred to GYN 
GYN: Deferred to GYN Rectal exam: Deferred to GYN Extremities:  No clubbing, cyanosis or edema. Pulses:  Dorsalis pedis and posterior tibial pulses felt without difficulty. Skin:  No rash or unusual mole changes noted. Lymph Nodes:  None felt in the cervical, supraclavicular, axillary or inguinal region. Neurological:  Cranial nerves II-XII grossly intact. Motor strength 5/5. DTRs 2+ and symmetric. Station and gait normal. 
Physical exam otherwise negative Assessment/Plan:  
 
Diagnoses and all orders for this visit: 
 
Routine physical examination 
-     COLLECTION VENOUS BLOOD,VENIPUNCTURE 
-     CBC WITH AUTOMATED DIFF 
-     LIPID PANEL 
-     METABOLIC PANEL, COMPREHENSIVE 
-     TSH 3RD GENERATION 
-     VITAMIN D, 25 HYDROXY 
-     URINALYSIS W/MICROSCOPIC Essential hypertension Nonintractable headache, unspecified chronicity pattern, unspecified headache type Malignant neoplasm of upper-outer quadrant of right breast in female, estrogen receptor positive ([address] S/P mastectomy, bilateral 
 
 
 
Other instructions: The patient's medications were reviewed and reconciled. No change in her current medical regimen is made. Blood pressure was initially up when she came into the office but settled down nicely. Body mass index is 25.6 and dietary counseling is given. She is to restrict her salt. Health maintenance issues were reviewed and she is up-to-date on Pap testing and I have asked for her to have a copy of this report forwarded to us for her record. Await results of multiple labs Continued follow-up with Dr. Mimi Reynoso in regards to her breast cancer Follow-up 6 months Follow-up and Dispositions · Return in about 6 months (around 11/1/2019).  
  
 
 
Misti Ro MD

## 2019-05-01 NOTE — PATIENT INSTRUCTIONS
Home Blood Pressure Test: About This Test 
What is it? A home blood pressure test allows you to keep track of your blood pressure at home. Blood pressure is a measure of the force of blood against the walls of your arteries. Blood pressure readings include two numbers, such as 130/80 (say \"130 over 80\"). The first number is the systolic pressure. The second number is the diastolic pressure. Why is this test done? You may do this test at home to: · Find out if you have high blood pressure. · Track your blood pressure if you have high blood pressure. · Track how well medicine is working to reduce high blood pressure. · Check how lifestyle changes, such as weight loss and exercise, are affecting blood pressure. How can you prepare for the test? 
· Do not use caffeine, tobacco, or medicines known to raise blood pressure (such as nasal decongestant sprays) for at least 30 minutes before taking your blood pressure. · Do not exercise for at least 30 minutes before taking your blood pressure. What happens before the test? 
Take your blood pressure while you feel comfortable and relaxed. Sit quietly with both feet on the floor for at least 5 minutes before the test. 
What happens during the test? 
· Sit with your arm slightly bent and resting on a table so that your upper arm is at the same level as your heart. · Roll up your sleeve or take off your shirt to expose your upper arm. · Wrap the blood pressure cuff around your upper arm so that the lower edge of the cuff is about 1 inch above the bend of your elbow. Proceed with the following steps depending on if you are using an automatic or manual pressure monitor. Automatic blood pressure monitors · Press the on/off button on the automatic monitor and wait until the ready-to-measure \"heart\" symbol appears next to zero in the display window. · Press the start button. The cuff will inflate and deflate by itself. · Your blood pressure numbers will appear on the screen. · Write your numbers in your log book, along with the date and time. Manual blood pressure monitors · Place the earpieces of a stethoscope in your ears, and place the bell of the stethoscope over the artery, just below the cuff. · Close the valve on the rubber inflating bulb. · Squeeze the bulb rapidly with your opposite hand to inflate the cuff until the dial or column of mercury reads about 30 mm Hg higher than your usual systolic pressure. If you do not know your usual pressure, inflate the cuff to 210 mm Hg or until the pulse at your wrist disappears. · Open the pressure valve just slightly by twisting or pressing the valve on the bulb. · As you watch the pressure slowly fall, note the level on the dial at which you first start to hear a pulsing or tapping sound through the stethoscope. This is your systolic blood pressure. · Continue letting the air out slowly. The sounds will become muffled and will finally disappear. Note the pressure when the sounds completely disappear. This is your diastolic blood pressure. Let out all the remaining air. · Write your numbers in your log book, along with the date and time. What else should you know about the test? 
It is more accurate to take the average of several readings made throughout the day than to rely on a single reading. It's normal for blood pressure to go up and down throughout the day. Follow-up care is a key part of your treatment and safety. Be sure to make and go to all appointments, and call your doctor if you are having problems. It's also a good idea to keep a list of the medicines you take. Where can you learn more? Go to http://keily-tyson.info/. Enter C427 in the search box to learn more about \"Home Blood Pressure Test: About This Test.\" Current as of: July 22, 2018 Content Version: 11.9 © 2854-0905 [employer].  Care instructions adapted under license by [address] (which disclaims liability or warranty for this information). If you have questions about a medical condition or this instruction, always ask your healthcare professional. [address] any warranty or liability for your use of this information.

## 2019-05-04 LAB
25(OH)D3 SERPL-MCNC: 83 NG/ML (ref 30–96)
TSH SERPL DL<=0.05 MIU/L-ACNC: 0.65 UIU/ML (ref 0.34–5.6)

## 2019-05-13 NOTE — IP AVS SNAPSHOT
[address] [facility] [address] [place] [address] 
[phone] Patient: Ofelia Mandujano MRN: [id number] JTU:[id number]/[id number] About your hospitalization You were admitted on:  April 10, 2018 You last received care in the:  [facility] [place] 3N TELEMETRY You were discharged on:  April 11, 2018 Why you were hospitalized Your primary diagnosis was:  Not on File Your diagnoses also included:  Breast Cancer Of Upper-Outer Quadrant Of Right Female Breast (Hcc) Follow-up Information Follow up With Details Comments Contact Info Amy Owen MD   [place] [facility] 
[phone] Antonio Parra MD Call in 1 week follow up [address] [facility] [address] [place] [address] 
[phone] Your Scheduled Appointments Thursday April 19, 2018  8:30 AM EDT New Patient with Arnulfo Brunner MD  
[facility] [address] [place] [address] [address] [address] [phone] Monday April 23, 2018 11:20 AM EDT  
POST OP with Solomon Oliva MD  
[address] at [address]  
[phone] Discharge Orders None A check rachell indicates which time of day the medication should be taken. My Medications START taking these medications Instructions Each Dose to Equal  
 Morning Noon Evening Bedtime  
 cephALEXin 250 mg capsule Commonly known as:  Ronelle Pleas Your last dose was: Your next dose is: Take 1 Cap by mouth three (3) times daily for 10 days. Indications: tissue expanders 250 mg HYDROmorphone 2 mg tablet Commonly known as:  DILAUDID Your last dose was: Your next dose is: Barretts syndrome Take 1 Tab by mouth every four (4) hours as needed for Pain. Max Daily Amount: 12 mg.  
 2 mg CHANGE how you take these medications Instructions Each Dose to Equal  
 Morning Noon Evening Bedtime  
 propranolol LA 60 mg SR capsule Commonly known as:  INDERAL LA What changed:  when to take this Your last dose was: Your next dose is: Take 2 Caps by mouth daily. 120 mg CONTINUE taking these medications Instructions Each Dose to Equal  
 Morning Noon Evening Bedtime  
 multivitamin tablet Commonly known as:  ONE A DAY Your last dose was: Your next dose is: Take 1 Tab by mouth daily. 1 Tab STOP taking these medications   
 aspirin 325 mg tablet Commonly known as:  ASPIRIN Where to Get Your Medications These medications were sent to [address] [place] [address] 12, [address] [place] [address] Phone:  [phone]  
  cephALEXin 250 mg capsule Information on where to get these meds will be given to you by the nurse or doctor. ! Ask your nurse or doctor about these medications HYDROmorphone 2 mg tablet Opioid Education Prescription Opioids: What You Need to Know: 
 
 
BREAST RECONSTRUCTION 1. You must have a responsible adult with you for the first 24 hours after surgery. 2. Please wear comfortable clothing the day of surgery. Do not wear clothing that pulls over your head.  
 
3. You may start clear liquids once you have arrived home and continue until any nausea has subsided. Then you may advance to resume a normal diet. 4. Bed rest is recommended for the first 24 hours. You may be up for meals or go to the bathroom with assistance. Minimize arm extension and lifting for the first two weeks. 5. Wear the JULIETH hose until you are moving about normally, in 2-3 days. 6. Take your prescription medications as directed for the first 2-3 days. Then you may take it as needed. You have been prescribed________________________. Do not use any prescriptions with Aspirin or ibuprofen for 2 wdays. You may take non aspirin or non ibuprofen headache medications such as Tylenol Extra Strength. You may resume normal medications. 7. Do not drive or operate machinery while on pain medication. You may return to work ___________. You may drive___________. 8. Do not drink alcohol or take tranquilizers or take sleeping medications while on pain medication. 9. You may remove the bra and dressings in 24 hours and may shower. Wear the bra 24 hours a day for _____ days. You may remove it and wash and dry it and then reapply. 10. No lifting over 10 pounds for 2 weeks. No underwire bras. 11. You may find it more comfortable to sleep in a recliner for the first couple of nights. Do not sleep on your sides until your postop visit. 12. You may apply ice to the incision for the first 24 hours. If you have increasing pain and pain on one side or the other, with the feeling of tightness call the office at [phone]. You will need to return to the office for your postop appointment and suture removal. Your appointment is ___________. 14. You will be asked to begin massaging your breasts daily starting two weeks after surgery to keep the implant pockets loose and implants soft. This gives a good self exam also. Your physician will explain this procedure to you on postop visit.  
 
15. Please call the office at [phone] if you have any questions after your surgery. 16. COMPLICATIONS: 
Call the office at [phone] if any of the following occur- 
Fever over 101 degrees taken by mouth or 100 degrees rectally. Pain not relieved by medications prescribed. Blood soaked dressing (small amounts of oozing may be normal.) Unable to urinate or completely empty your bladder. Nausea or vomiting lasting longer than 24 hours. Numbness, tingling or cold fingers or toes. Swelling around incision. Increasing and progressive drainage from incision or exam site. Increased redness, warmth or hardness around incision or exam site If you experience shortness of breath, chest pain or swelling of legs or feet, go immediately to the emergency room 17. Make sure you stay hydrated and take stool softeners while you are on the narcotic pain medications Take a Colace daily and you may supplement with Miralax to help you have a bowel movement. Increase the miralax if you haven't had a bowel movement in 2 days IF YOU NEED IMMEDIATE ATTENTION, GO TO THE EMERGENCY ROOM OF [address] [place] [address]. I acknowledge receipt of the discharge instructions: 
 
Patient/Guardian Signature _______________________________________ Date___________________ Nurses Signature_______________________________________________ Date___________________ [facility] DRAIN CARE 
 
HOME CARE INSTRUCTIONS You are going home with a drain called a Telly (sometimes called a J. P.). You will need to empty and measure the drainage during the first few days of recovery. The following instructions reinforce the information you received prior to your discharge from our office-based surgical center. In the picture below, # 1 indicates where your drain is connected to the reservoir. You will not need to work with this part of the drain. See the instructions below for references to # 3 and #4. Empty the drain whenever it is close to full or at least 3 times per day ? Wash your hands prior to emptying the drain Un-pin the drainage reservoir (the lined container) from your  
clothing 
 
? Hold the reservoir with the emptying port at the top (#3) ? Remove the drainage plug (#4) from the emptying port (#3) ? Estimate the amount of drainage by the gradation  
marks on the side of the reservoir ? Record the drainage amount on the Drainage Record 
on the reverse side of this form ? Empty the drainage into the toilet ? Squeeze the reservoir and replug it (recharge) ? Remember, every time the drainage reservoir is fully expanded it is considered uncharged and it should be emptied, squeezed and replugged (recharged) To prevent infection, avoid touching the end of the emptying port (#3) and the drainage plug (#4) with your hands (see diagram). For the drain to work it needs to be in the [address] position. Keep the area around the tube dry and covered with a dressing. The color and amount of drainage may change. When you return for your post operative visit please bring the Drainage Record       with you. When the drainage is less than 30  40 cc in a 24-hour period OR when the drains have been in for 2 weeks, please call the office ([phone]) to schedule removal. 
If you have any questions or concerns about your drain or the above instructions       please call our office at  
[address] [facility] 
[facility] Patient Name:  __________________________________________________ Drain # 1 Date Amount Amount Amount Daily Total Amount Drain # 2 Date Amount Amount Amount Daily Total Amount Introducing [facility] [place] & [facility] SERVICES! Kimberli Lees introduces 3Jamt patient portal. Now you can access parts of your medical record, email your doctor's office, and request medication refills online. 1. In your internet browser, go to [email or web address] 2. Click on the First Time User? Click Here link in the Sign In box. You will see the New Member Sign Up page. 3. Enter your BlogCN Access Code exactly as it appears below. You will not need to use this code after youve completed the sign-up process. If you do not sign up before the expiration date, you must request a new code. · BlogCN Access Code: [email or web address] Expires: 5/15/2018 11:57 AM 
 
4. Enter the last four digits of your Social Security Number (xxxx) and Date of Birth (mm/dd/yyyy) as indicated and click Submit. You will be taken to the next sign-up page. 5. Create a BlogCN ID. This will be your BlogCN login ID and cannot be changed, so think of one that is secure and easy to remember. 6. Create a BlogCN password. You can change your password at any time. 7. Enter your Password Reset Question and Answer. This can be used at a later time if you forget your password. 8. Enter your e-mail address. You will receive e-mail notification when new information is available in [address] 9. Click Sign Up. You can now view and download portions of your medical record. 10. Click the Download Summary menu link to download a portable copy of your medical information. If you have questions, please visit the Frequently Asked Questions section of the BlogCN website. Remember, BlogCN is NOT to be used for urgent needs. For medical emergencies, dial 911. Now available from your iPhone and Android! Introducing Ruben Watson As a Kimberli Lees patient, I wanted to make you aware of our electronic visit tool called Ruben Watson. Kimberli Constantinogeeangella 24/7 allows you to connect within minutes with a medical provider 24 hours a day, seven days a week via a mobile device or tablet or logging into a secure website from your computer. You can access [facility] from anywhere in the [place]. A virtual visit might be right for you when you have a simple condition and feel like you just dont want to get out of bed, or cant get away from work for an appointment, when your regular [address] [place] [address] provider is not available (evenings, weekends or holidays), or when youre out of town and need minor care. Electronic visits cost only $49 and if the [address] [place] [address] 24/7 provider determines a prescription is needed to treat your condition, one can be electronically transmitted to a nearby pharmacy*. Please take a moment to enroll today if you have not already done so. The enrollment process is free and takes just a few minutes. To enroll, please download the [employer] 24/7 henry to your tablet or phone, or visit [email or web address] to enroll on your computer. And, as an [address] [place] [address] patient with a [employer] account, the results of your visits will be scanned into your electronic medical record and your primary care provider will be able to view the scanned results. We urge you to continue to see your regular [address] [place] [address] provider for your ongoing medical care. And while your primary care provider may not be the one available when you seek a Ruben Hernandezfin virtual visit, the peace of mind you get from getting a real diagnosis real time can be priceless. For more information on Ex[employer]psaskiafin, view our Frequently Asked Questions (FAQs) at [email or web address]. Sincerely, 
 
Megan Boyd MD 
Chief Medical Officer Maryanne Thomas *:  certain medications cannot be prescribed via [facility] Unresulted Labs-Please follow up with your PCP about these lab tests Order Current Status XR FLUOROSCOPY UNDER 60 MINUTES In process Providers Seen During Your Hospitalization Provider Specialty Primary office phone Machelle Marks MD Breast Surgery [phone] León Peterson MD Plastic Surgery [phone] Your Primary Care Physician (PCP) Primary Care Physician Office Phone Office Fax Dayna Maddox [phone] You are allergic to the following No active allergies Recent Documentation Height Weight BMI OB Status Smoking Status 1.524 m 51.7 kg 22.26 kg/m2 Having regular periods Never Smoker Emergency Contacts Name Discharge Info Relation Home Work Mobile [address] [facility] CAREGIVER [3] Spouse [3] [id number] Patient Belongings The following personal items are in your possession at time of discharge: 
  Dental Appliances: None  Visual Aid: Glasses, With patient          Jewelry: None  Clothing:  (clothing in locker)    Other Valuables: Eyeglasses Please provide this summary of care documentation to your next provider. Signatures-by signing, you are acknowledging that this After Visit Summary has been reviewed with you and you have received a copy. Patient Signature:  ____________________________________________________________ Date:  ____________________________________________________________  
  
[facility] Provider Signature:  ____________________________________________________________ Date:  ____________________________________________________________

## 2019-06-04 ENCOUNTER — HOSPITAL ENCOUNTER (OUTPATIENT)
Dept: PREADMISSION TESTING | Age: 44
Discharge: HOME | End: 2019-06-04
Payer: COMMERCIAL

## 2019-06-04 VITALS
HEART RATE: 75 BPM | TEMPERATURE: 98.5 F | SYSTOLIC BLOOD PRESSURE: 135 MMHG | DIASTOLIC BLOOD PRESSURE: 86 MMHG | HEIGHT: 60 IN | BODY MASS INDEX: 25.72 KG/M2 | WEIGHT: 131 LBS

## 2019-06-04 LAB
ATRIAL RATE: 69 BPM
CALCULATED P AXIS, ECG09: 47 DEGREES
CALCULATED T AXIS, ECG11: 25 DEGREES
DIAGNOSIS, 93000: NORMAL
P-R INTERVAL, ECG05: 170 MS
Q-T INTERVAL, ECG07: 388 MS
QRS DURATION, ECG06: 70 MS
QTC CALCULATION (BEZET), ECG08: 415 MS
VENTRICULAR RATE, ECG03: 69 BPM

## 2019-06-04 PROCEDURE — 93005 ELECTROCARDIOGRAM TRACING: CPT

## 2019-06-04 RX ORDER — MULTIVIT WITH MINERALS/HERBS
1 TABLET ORAL DAILY
COMMUNITY

## 2019-06-04 RX ORDER — CHOLECALCIFEROL (VITAMIN D3) 125 MCG
5000 CAPSULE ORAL DAILY
COMMUNITY

## 2019-06-04 NOTE — PERIOP NOTES
Pre-Operative Instructions    DO NOT EAT OR DRINK ANYTHING AFTER MIDNIGHT THE NIGHT BEFORE SURGERY. Patient given surgical site infection information FAQs handout and hand hygiene tips sheet. Pre-operative instructions reviewed and patient verbalizes understanding of instructions. Patient has been given the opportunity to ask additional questions.  PT INSTRUCTED TO PURCHASE 2 4 OZ BOTTLES OF CHG SOAP,INSTRUCTIONS GIVEN ON USE.

## 2019-06-18 ENCOUNTER — ANESTHESIA EVENT (OUTPATIENT)
Dept: MEDSURG UNIT | Age: 44
End: 2019-06-18
Payer: COMMERCIAL

## 2019-06-18 ENCOUNTER — ANESTHESIA (OUTPATIENT)
Dept: MEDSURG UNIT | Age: 44
End: 2019-06-18
Payer: COMMERCIAL

## 2019-06-18 ENCOUNTER — HOSPITAL ENCOUNTER (OUTPATIENT)
Age: 44
Setting detail: OUTPATIENT SURGERY
Discharge: HOME | End: 2019-06-18
Attending: PLASTIC SURGERY | Admitting: PLASTIC SURGERY
Payer: COMMERCIAL

## 2019-06-18 VITALS
WEIGHT: 131 LBS | HEIGHT: 60 IN | SYSTOLIC BLOOD PRESSURE: 125 MMHG | BODY MASS INDEX: 25.72 KG/M2 | HEART RATE: 75 BPM | DIASTOLIC BLOOD PRESSURE: 85 MMHG | RESPIRATION RATE: 16 BRPM | OXYGEN SATURATION: 100 % | TEMPERATURE: 95.2 F

## 2019-06-18 DIAGNOSIS — C50.411 MALIGNANT NEOPLASM OF UPPER-OUTER QUADRANT OF RIGHT FEMALE BREAST, UNSPECIFIED ESTROGEN RECEPTOR STATUS (HCC): Primary | ICD-10-CM

## 2019-06-18 LAB — HCG UR QL: NEGATIVE

## 2019-06-18 PROCEDURE — 81025 URINE PREGNANCY TEST: CPT

## 2019-06-18 PROCEDURE — 77030011825 HC SUPP SURG PSTOP S2SG -B: Performed by: PLASTIC SURGERY

## 2019-06-18 PROCEDURE — 77030002966 HC SUT PDS J&J -A: Performed by: PLASTIC SURGERY

## 2019-06-18 PROCEDURE — 77030002974 HC SUT PLN J&J -A: Performed by: PLASTIC SURGERY

## 2019-06-18 PROCEDURE — 74011250636 HC RX REV CODE- 250/636: Performed by: PLASTIC SURGERY

## 2019-06-18 PROCEDURE — 77030013079 HC BLNKT BAIR HGGR 3M -A: Performed by: ANESTHESIOLOGY

## 2019-06-18 PROCEDURE — 77030039266 HC ADH SKN EXOFIN S2SG -A: Performed by: PLASTIC SURGERY

## 2019-06-18 PROCEDURE — 76210000046 HC AMBSU PH II REC FIRST 0.5 HR: Performed by: PLASTIC SURGERY

## 2019-06-18 PROCEDURE — 77030008538 HC TBNG LIPO SUC WELL -B: Performed by: PLASTIC SURGERY

## 2019-06-18 PROCEDURE — 74011000272 HC RX REV CODE- 272: Performed by: PLASTIC SURGERY

## 2019-06-18 PROCEDURE — 77030011640 HC PAD GRND REM COVD -A: Performed by: PLASTIC SURGERY

## 2019-06-18 PROCEDURE — 77030020268 HC MISC GENERAL SUPPLY: Performed by: PLASTIC SURGERY

## 2019-06-18 PROCEDURE — 77030008534 HC TBNG LIPOSUC BYRO -B: Performed by: PLASTIC SURGERY

## 2019-06-18 PROCEDURE — 77030011267 HC ELECTRD BLD COVD -A: Performed by: PLASTIC SURGERY

## 2019-06-18 PROCEDURE — 76030000005 HC AMB SURG OR TIME 2.5 TO 3: Performed by: PLASTIC SURGERY

## 2019-06-18 PROCEDURE — 77030008483: Performed by: PLASTIC SURGERY

## 2019-06-18 PROCEDURE — 77030036554: Performed by: PLASTIC SURGERY

## 2019-06-18 PROCEDURE — 77030013674 HC FIL EXPND TISS ALGN -A: Performed by: PLASTIC SURGERY

## 2019-06-18 PROCEDURE — 77030020782 HC GWN BAIR PAWS FLX 3M -B

## 2019-06-18 PROCEDURE — 76210000035 HC AMBSU PH I REC 1 TO 1.5 HR: Performed by: PLASTIC SURGERY

## 2019-06-18 PROCEDURE — 77030008684 HC TU ET CUF COVD -B: Performed by: ANESTHESIOLOGY

## 2019-06-18 PROCEDURE — 77030020263 HC SOL INJ SOD CL0.9% LFCR 1000ML: Performed by: PLASTIC SURGERY

## 2019-06-18 PROCEDURE — 77030018836 HC SOL IRR NACL ICUM -A: Performed by: PLASTIC SURGERY

## 2019-06-18 PROCEDURE — 88305 TISSUE EXAM BY PATHOLOGIST: CPT

## 2019-06-18 PROCEDURE — 74011000250 HC RX REV CODE- 250: Performed by: PLASTIC SURGERY

## 2019-06-18 PROCEDURE — 77030032490 HC SLV COMPR SCD KNE COVD -B: Performed by: PLASTIC SURGERY

## 2019-06-18 PROCEDURE — 74011250636 HC RX REV CODE- 250/636: Performed by: ANESTHESIOLOGY

## 2019-06-18 PROCEDURE — C1789 PROSTHESIS, BREAST, IMP: HCPCS | Performed by: PLASTIC SURGERY

## 2019-06-18 PROCEDURE — 77030026438 HC STYL ET INTUB CARD -A: Performed by: ANESTHESIOLOGY

## 2019-06-18 PROCEDURE — 77030031139 HC SUT VCRL2 J&J -A: Performed by: PLASTIC SURGERY

## 2019-06-18 PROCEDURE — 74011250636 HC RX REV CODE- 250/636

## 2019-06-18 PROCEDURE — 77030020255 HC SOL INJ LR 1000ML BG: Performed by: PLASTIC SURGERY

## 2019-06-18 PROCEDURE — 76060000065 HC AMB SURG ANES 2.5 TO 3 HR: Performed by: PLASTIC SURGERY

## 2019-06-18 PROCEDURE — 77030033138 HC SUT PGA STRATFX J&J -B: Performed by: PLASTIC SURGERY

## 2019-06-18 PROCEDURE — 77030011264 HC ELECTRD BLD EXT COVD -A: Performed by: PLASTIC SURGERY

## 2019-06-18 PROCEDURE — 74011000250 HC RX REV CODE- 250

## 2019-06-18 RX ORDER — SUCCINYLCHOLINE CHLORIDE 20 MG/ML
INJECTION INTRAMUSCULAR; INTRAVENOUS AS NEEDED
Status: DISCONTINUED | OUTPATIENT
Start: 2019-06-18 | End: 2019-06-18 | Stop reason: HOSPADM

## 2019-06-18 RX ORDER — ONDANSETRON 2 MG/ML
4 INJECTION INTRAMUSCULAR; INTRAVENOUS AS NEEDED
Status: DISCONTINUED | OUTPATIENT
Start: 2019-06-18 | End: 2019-06-18 | Stop reason: HOSPADM

## 2019-06-18 RX ORDER — DEXAMETHASONE SODIUM PHOSPHATE 4 MG/ML
INJECTION, SOLUTION INTRA-ARTICULAR; INTRALESIONAL; INTRAMUSCULAR; INTRAVENOUS; SOFT TISSUE AS NEEDED
Status: DISCONTINUED | OUTPATIENT
Start: 2019-06-18 | End: 2019-06-18 | Stop reason: HOSPADM

## 2019-06-18 RX ORDER — OXYCODONE AND ACETAMINOPHEN 5; 325 MG/1; MG/1
1 TABLET ORAL
Qty: 30 TAB | Refills: 0 | Status: SHIPPED | OUTPATIENT
Start: 2019-06-18 | End: 2019-06-25

## 2019-06-18 RX ORDER — FENTANYL CITRATE 50 UG/ML
50 INJECTION, SOLUTION INTRAMUSCULAR; INTRAVENOUS AS NEEDED
Status: DISCONTINUED | OUTPATIENT
Start: 2019-06-18 | End: 2019-06-18 | Stop reason: HOSPADM

## 2019-06-18 RX ORDER — SODIUM CHLORIDE 0.9 % (FLUSH) 0.9 %
5-40 SYRINGE (ML) INJECTION AS NEEDED
Status: DISCONTINUED | OUTPATIENT
Start: 2019-06-18 | End: 2019-06-18 | Stop reason: HOSPADM

## 2019-06-18 RX ORDER — DIPHENHYDRAMINE HYDROCHLORIDE 50 MG/ML
12.5 INJECTION, SOLUTION INTRAMUSCULAR; INTRAVENOUS AS NEEDED
Status: DISCONTINUED | OUTPATIENT
Start: 2019-06-18 | End: 2019-06-18 | Stop reason: HOSPADM

## 2019-06-18 RX ORDER — ROCURONIUM BROMIDE 10 MG/ML
INJECTION, SOLUTION INTRAVENOUS AS NEEDED
Status: DISCONTINUED | OUTPATIENT
Start: 2019-06-18 | End: 2019-06-18 | Stop reason: HOSPADM

## 2019-06-18 RX ORDER — GLYCOPYRROLATE 0.2 MG/ML
INJECTION INTRAMUSCULAR; INTRAVENOUS AS NEEDED
Status: DISCONTINUED | OUTPATIENT
Start: 2019-06-18 | End: 2019-06-18 | Stop reason: HOSPADM

## 2019-06-18 RX ORDER — FENTANYL CITRATE 50 UG/ML
25 INJECTION, SOLUTION INTRAMUSCULAR; INTRAVENOUS
Status: DISCONTINUED | OUTPATIENT
Start: 2019-06-18 | End: 2019-06-18 | Stop reason: HOSPADM

## 2019-06-18 RX ORDER — MIDAZOLAM HYDROCHLORIDE 1 MG/ML
0.5 INJECTION, SOLUTION INTRAMUSCULAR; INTRAVENOUS
Status: DISCONTINUED | OUTPATIENT
Start: 2019-06-18 | End: 2019-06-18 | Stop reason: HOSPADM

## 2019-06-18 RX ORDER — LIDOCAINE HYDROCHLORIDE 10 MG/ML
0.1 INJECTION, SOLUTION EPIDURAL; INFILTRATION; INTRACAUDAL; PERINEURAL AS NEEDED
Status: DISCONTINUED | OUTPATIENT
Start: 2019-06-18 | End: 2019-06-18 | Stop reason: HOSPADM

## 2019-06-18 RX ORDER — ONDANSETRON 2 MG/ML
INJECTION INTRAMUSCULAR; INTRAVENOUS AS NEEDED
Status: DISCONTINUED | OUTPATIENT
Start: 2019-06-18 | End: 2019-06-18 | Stop reason: HOSPADM

## 2019-06-18 RX ORDER — BUPIVACAINE HYDROCHLORIDE AND EPINEPHRINE 2.5; 5 MG/ML; UG/ML
INJECTION, SOLUTION EPIDURAL; INFILTRATION; INTRACAUDAL; PERINEURAL AS NEEDED
Status: DISCONTINUED | OUTPATIENT
Start: 2019-06-18 | End: 2019-06-18 | Stop reason: HOSPADM

## 2019-06-18 RX ORDER — FENTANYL CITRATE 50 UG/ML
INJECTION, SOLUTION INTRAMUSCULAR; INTRAVENOUS AS NEEDED
Status: DISCONTINUED | OUTPATIENT
Start: 2019-06-18 | End: 2019-06-18 | Stop reason: HOSPADM

## 2019-06-18 RX ORDER — HYDROMORPHONE HYDROCHLORIDE 2 MG/ML
INJECTION, SOLUTION INTRAMUSCULAR; INTRAVENOUS; SUBCUTANEOUS AS NEEDED
Status: DISCONTINUED | OUTPATIENT
Start: 2019-06-18 | End: 2019-06-18 | Stop reason: HOSPADM

## 2019-06-18 RX ORDER — ACETAMINOPHEN 10 MG/ML
INJECTION, SOLUTION INTRAVENOUS AS NEEDED
Status: DISCONTINUED | OUTPATIENT
Start: 2019-06-18 | End: 2019-06-18 | Stop reason: HOSPADM

## 2019-06-18 RX ORDER — OXYCODONE AND ACETAMINOPHEN 5; 325 MG/1; MG/1
1 TABLET ORAL AS NEEDED
Status: DISCONTINUED | OUTPATIENT
Start: 2019-06-18 | End: 2019-06-18 | Stop reason: HOSPADM

## 2019-06-18 RX ORDER — SODIUM CHLORIDE 9 MG/ML
25 INJECTION, SOLUTION INTRAVENOUS CONTINUOUS
Status: DISCONTINUED | OUTPATIENT
Start: 2019-06-18 | End: 2019-06-18 | Stop reason: HOSPADM

## 2019-06-18 RX ORDER — CEFAZOLIN SODIUM 1 G/3ML
INJECTION, POWDER, FOR SOLUTION INTRAMUSCULAR; INTRAVENOUS AS NEEDED
Status: DISCONTINUED | OUTPATIENT
Start: 2019-06-18 | End: 2019-06-18 | Stop reason: HOSPADM

## 2019-06-18 RX ORDER — SODIUM CHLORIDE, SODIUM LACTATE, POTASSIUM CHLORIDE, CALCIUM CHLORIDE 600; 310; 30; 20 MG/100ML; MG/100ML; MG/100ML; MG/100ML
125 INJECTION, SOLUTION INTRAVENOUS CONTINUOUS
Status: DISCONTINUED | OUTPATIENT
Start: 2019-06-18 | End: 2019-06-18 | Stop reason: HOSPADM

## 2019-06-18 RX ORDER — HYDROMORPHONE HYDROCHLORIDE 1 MG/ML
0.2 INJECTION, SOLUTION INTRAMUSCULAR; INTRAVENOUS; SUBCUTANEOUS
Status: DISCONTINUED | OUTPATIENT
Start: 2019-06-18 | End: 2019-06-18 | Stop reason: HOSPADM

## 2019-06-18 RX ORDER — MIDAZOLAM HYDROCHLORIDE 1 MG/ML
1 INJECTION, SOLUTION INTRAMUSCULAR; INTRAVENOUS AS NEEDED
Status: DISCONTINUED | OUTPATIENT
Start: 2019-06-18 | End: 2019-06-18 | Stop reason: HOSPADM

## 2019-06-18 RX ORDER — PROPOFOL 10 MG/ML
INJECTION, EMULSION INTRAVENOUS AS NEEDED
Status: DISCONTINUED | OUTPATIENT
Start: 2019-06-18 | End: 2019-06-18 | Stop reason: HOSPADM

## 2019-06-18 RX ORDER — DEXMEDETOMIDINE HYDROCHLORIDE 4 UG/ML
INJECTION, SOLUTION INTRAVENOUS AS NEEDED
Status: DISCONTINUED | OUTPATIENT
Start: 2019-06-18 | End: 2019-06-18 | Stop reason: HOSPADM

## 2019-06-18 RX ORDER — ACETAMINOPHEN 325 MG/1
650 TABLET ORAL ONCE
Status: DISCONTINUED | OUTPATIENT
Start: 2019-06-18 | End: 2019-06-18 | Stop reason: HOSPADM

## 2019-06-18 RX ORDER — SODIUM CHLORIDE, SODIUM LACTATE, POTASSIUM CHLORIDE, CALCIUM CHLORIDE 600; 310; 30; 20 MG/100ML; MG/100ML; MG/100ML; MG/100ML
INJECTION, SOLUTION INTRAVENOUS
Status: DISCONTINUED | OUTPATIENT
Start: 2019-06-18 | End: 2019-06-18 | Stop reason: HOSPADM

## 2019-06-18 RX ORDER — LIDOCAINE HYDROCHLORIDE 20 MG/ML
INJECTION, SOLUTION EPIDURAL; INFILTRATION; INTRACAUDAL; PERINEURAL AS NEEDED
Status: DISCONTINUED | OUTPATIENT
Start: 2019-06-18 | End: 2019-06-18 | Stop reason: HOSPADM

## 2019-06-18 RX ORDER — SODIUM CHLORIDE 0.9 % (FLUSH) 0.9 %
5-40 SYRINGE (ML) INJECTION EVERY 8 HOURS
Status: DISCONTINUED | OUTPATIENT
Start: 2019-06-18 | End: 2019-06-18 | Stop reason: HOSPADM

## 2019-06-18 RX ORDER — MORPHINE SULFATE 2 MG/ML
2 INJECTION, SOLUTION INTRAMUSCULAR; INTRAVENOUS
Status: DISCONTINUED | OUTPATIENT
Start: 2019-06-18 | End: 2019-06-18 | Stop reason: HOSPADM

## 2019-06-18 RX ORDER — MIDAZOLAM HYDROCHLORIDE 1 MG/ML
INJECTION, SOLUTION INTRAMUSCULAR; INTRAVENOUS AS NEEDED
Status: DISCONTINUED | OUTPATIENT
Start: 2019-06-18 | End: 2019-06-18 | Stop reason: HOSPADM

## 2019-06-18 RX ORDER — METOPROLOL TARTRATE 5 MG/5ML
INJECTION INTRAVENOUS AS NEEDED
Status: DISCONTINUED | OUTPATIENT
Start: 2019-06-18 | End: 2019-06-18 | Stop reason: HOSPADM

## 2019-06-18 RX ADMIN — PROPOFOL 150 MG: 10 INJECTION, EMULSION INTRAVENOUS at 07:46

## 2019-06-18 RX ADMIN — DEXMEDETOMIDINE HYDROCHLORIDE 4 MCG: 4 INJECTION, SOLUTION INTRAVENOUS at 08:38

## 2019-06-18 RX ADMIN — LIDOCAINE HYDROCHLORIDE 60 MG: 20 INJECTION, SOLUTION EPIDURAL; INFILTRATION; INTRACAUDAL; PERINEURAL at 07:46

## 2019-06-18 RX ADMIN — METOPROLOL TARTRATE 2 MG: 5 INJECTION INTRAVENOUS at 08:41

## 2019-06-18 RX ADMIN — DEXMEDETOMIDINE HYDROCHLORIDE 6 MCG: 4 INJECTION, SOLUTION INTRAVENOUS at 08:04

## 2019-06-18 RX ADMIN — ACETAMINOPHEN 1000 MG: 10 INJECTION, SOLUTION INTRAVENOUS at 08:01

## 2019-06-18 RX ADMIN — HYDROMORPHONE HYDROCHLORIDE 0.4 MG: 2 INJECTION, SOLUTION INTRAMUSCULAR; INTRAVENOUS; SUBCUTANEOUS at 09:00

## 2019-06-18 RX ADMIN — DEXMEDETOMIDINE HYDROCHLORIDE 4 MCG: 4 INJECTION, SOLUTION INTRAVENOUS at 08:06

## 2019-06-18 RX ADMIN — SODIUM CHLORIDE, SODIUM LACTATE, POTASSIUM CHLORIDE, CALCIUM CHLORIDE: 600; 310; 30; 20 INJECTION, SOLUTION INTRAVENOUS at 07:37

## 2019-06-18 RX ADMIN — DEXMEDETOMIDINE HYDROCHLORIDE 6 MCG: 4 INJECTION, SOLUTION INTRAVENOUS at 08:03

## 2019-06-18 RX ADMIN — HYDROMORPHONE HYDROCHLORIDE 0.6 MG: 2 INJECTION, SOLUTION INTRAMUSCULAR; INTRAVENOUS; SUBCUTANEOUS at 08:43

## 2019-06-18 RX ADMIN — ROCURONIUM BROMIDE 10 MG: 10 INJECTION, SOLUTION INTRAVENOUS at 07:46

## 2019-06-18 RX ADMIN — SUCCINYLCHOLINE CHLORIDE 120 MG: 20 INJECTION INTRAMUSCULAR; INTRAVENOUS at 07:46

## 2019-06-18 RX ADMIN — CEFAZOLIN SODIUM 2 G: 1 INJECTION, POWDER, FOR SOLUTION INTRAMUSCULAR; INTRAVENOUS at 08:01

## 2019-06-18 RX ADMIN — ONDANSETRON 4 MG: 2 INJECTION INTRAMUSCULAR; INTRAVENOUS at 10:09

## 2019-06-18 RX ADMIN — DEXAMETHASONE SODIUM PHOSPHATE 8 MG: 4 INJECTION, SOLUTION INTRA-ARTICULAR; INTRALESIONAL; INTRAMUSCULAR; INTRAVENOUS; SOFT TISSUE at 08:02

## 2019-06-18 RX ADMIN — FENTANYL CITRATE 100 MCG: 50 INJECTION, SOLUTION INTRAMUSCULAR; INTRAVENOUS at 07:46

## 2019-06-18 RX ADMIN — SODIUM CHLORIDE, POTASSIUM CHLORIDE, SODIUM LACTATE AND CALCIUM CHLORIDE 125 ML/HR: 600; 310; 30; 20 INJECTION, SOLUTION INTRAVENOUS at 07:20

## 2019-06-18 RX ADMIN — DEXMEDETOMIDINE HYDROCHLORIDE 4 MCG: 4 INJECTION, SOLUTION INTRAVENOUS at 08:05

## 2019-06-18 RX ADMIN — GLYCOPYRROLATE 0.2 MG: 0.2 INJECTION INTRAMUSCULAR; INTRAVENOUS at 08:26

## 2019-06-18 RX ADMIN — MIDAZOLAM HYDROCHLORIDE 2 MG: 1 INJECTION, SOLUTION INTRAMUSCULAR; INTRAVENOUS at 07:37

## 2019-06-18 RX ADMIN — GLYCOPYRROLATE 0.2 MG: 0.2 INJECTION INTRAMUSCULAR; INTRAVENOUS at 08:19

## 2019-06-18 RX ADMIN — DEXMEDETOMIDINE HYDROCHLORIDE 6 MCG: 4 INJECTION, SOLUTION INTRAVENOUS at 08:37

## 2019-06-18 NOTE — H&P
Surgery History and Physical    Subjective: Harvey Feng is a 40 y.o.   female who presents for second stage breast reconstruction and port removal.    Patient Active Problem List    Diagnosis Date Noted    Hypertension 04/08/2019     Priority: 1 - One    Headache 04/08/2019     Priority: 1 - One    S/P mastectomy, bilateral 07/23/2018    Breast cancer of upper-outer quadrant of right female breast ([facility] 04/10/2018    Malignant neoplasm of upper-outer quadrant of right breast in female, estrogen receptor positive ([facility] 02/14/2018    Fluttering heart 10/18/2016     Past Medical History:   Diagnosis Date    Arrhythmia     PALPITATIONS    Cancer ([facility] 2018    BREAST, Right    Gestational diabetes     Headache     Headache 4/8/2019    Hypertension     Ill-defined condition     MIGRAINES    Palpitations     Prediabetes       Past Surgical History:   Procedure Laterality Date    HX BREAST BIOPSY Right 02/2018    HX BREAST RECONSTRUCTION Bilateral 4/10/2018    BREAST RECONSTRUCTION performed by Josh Chávez MD at [address] HX MASTECTOMY Bilateral 4/10/2018    BILATERAL BREAST MASTECTOMIES, RIGHT BREAST SENTINEL NODE BIOPSY (SN 4-9 3:00), BILATERAL PRE-PEC VS SUB-PEC TISSUE EXPANDERS AND ALLODERM WITH DERMAL MATRIX, PORTACATH INSERTION performed by Monica Mann MD at [address] HX WISDOM TEETH EXTRACTION        Social History     Tobacco Use    Smoking status: Never Smoker    Smokeless tobacco: Never Used   Substance Use Topics    Alcohol use: No      Family History   Problem Relation Age of Onset    Diabetes Mother     Asthma Mother     Hypertension Mother     Diabetes Father     Heart Disease Father     Glaucoma Father     No Known Problems Sister     Diabetes Brother     No Known Problems Sister     Hypertension Brother     Breast Cancer Other         66's    Anesth Problems Neg Hx       Prior to Admission medications    Medication Sig Start Date End Date Taking? Authorizing Provider   cholecalciferol, vitamin D3, (VITAMIN D3) 2,000 unit tab Take 5,000 Units by mouth daily. Provider, Historical   b complex vitamins tablet Take 1 Tab by mouth daily. Provider, Historical   C/sourcherry/celery/grape seed (TART CHERRY PO) Take 2 Caps by mouth daily. Provider, Historical   OTHER Take 1 Cap by mouth daily. Indications: POMEGRANTE COMPLETE    Provider, Historical   mv-mn/C/glutamin/lysin/xian226 (AIRBORNE, ASCORBATE SODIUM, PO) Take 1 Gum by mouth daily. Provider, Historical   propranolol LA (INDERAL LA) 60 mg SR capsule Take 2 Caps by mouth daily. Patient taking differently: Take 120 mg by mouth nightly. 2/21/19   Fani Hermosillo MD   tamoxifen (NOLVADEX) 20 mg tablet Take 20 mg by mouth nightly. 1/11/19   Provider, Historical   multivitamin (ONE A DAY) tablet Take 1 Tab by mouth daily. Provider, Historical     No Known Allergies      Review of Systems:    A comprehensive review of systems was negative except for that written in the History of Present Illness. Objective:     No data found. No data recorded. Physical Exam:  General:  Alert, cooperative, no distress, appears stated age. Eyes:  Conjunctivae/corneas clear. PERRL, EOMs intact. Fundi benign   Ears:  Normal TMs and external ear canals both ears. Nose: Nares normal. Septum midline. Mucosa normal. No drainage or sinus tenderness. Mouth/Throat: Lips, mucosa, and tongue normal. Teeth and gums normal.   Neck: Supple, symmetrical, trachea midline, no adenopathy, thyroid: no enlargment/tenderness/nodules, no carotid bruit and no JVD. Back:   Symmetric, no curvature. ROM normal. No CVA tenderness. Lungs:   Clear to auscultation bilaterally. Heart:  Regular rate and rhythm, S1, S2 normal, no murmur, click, rub or gallop. Abdomen:   Soft, non-tender. Bowel sounds normal. No masses,  No organomegaly.    Extremities: Extremities normal, atraumatic, no cyanosis or edema.   Pulses: 2+ and symmetric all extremities. Skin: Skin color, texture, turgor normal. No rashes or lesions   Lymph nodes: Cervical, supraclavicular, and axillary nodes normal.   Neurologic: CNII-XII intact. Normal strength, sensation and reflexes throughout. Labs: No results found for this or any previous visit (from the past 24 hour(s)). Data Review:    CBC:   Lab Results   Component Value Date/Time    WBC 7.1 05/01/2019 02:31 PM    RBC 4.39 05/01/2019 02:31 PM    HGB 13.9 05/01/2019 02:31 PM    HCT 40.8 05/01/2019 02:31 PM    PLATELET 425.4 71/66/5144 02:31 PM        Assessment:     Active Problems:    * No active hospital problems.  *      Plan:     Remove and replace bilateral tissue expanders with saline implants and port removal, fat grafting

## 2019-06-18 NOTE — BRIEF OP NOTE
BRIEF OPERATIVE NOTE    Date of Procedure: 6/18/2019   Preoperative Diagnosis: DUCTAL CARCINOMA IN SITU    Postoperative Diagnosis: DUCTAL CARCINOMA IN SITU    Procedure(s):  REMOVE BILATERAL BREAST TISSUE EXPANDERS, REVISION OF RECONSTRUCTED BREASTS,  PLACE BILATERAL BREAST IMPLANTS, FAT GRAFTING FROM ABDOMEN TO BILATERAL BREASTS  BREAST RECONSTRUCTION  FAT GRAFTING  Surgeon(s) and Role:     * Kush Kendall MD - Primary         Surgical Assistant: Alise You RN    Surgical Staff:  Circ-1: Poonam Siddiqui RN  Circ-Relief: Raquel Sung RN  Registered Nurse First Assistant: Jose Daniel Hoang RN  Scrub RN-1: Deangelo Pena RN  Event Time In Time Out   Incision Start 5211    Incision Close       Anesthesia: General   Estimated Blood Loss: minimal  Specimens:   ID Type Source Tests Collected by Time Destination   1 : LEFT BREAST SKIN Fresh Breast  Kush Kendall MD 6/18/2019 0818 Pathology   2 : RIGHT BREAST SKIN Fresh Breast  Kush Kendall MD 6/18/2019 0820 Pathology      Findings: good flaps   Complications: none  Implants:   Implant Name Type Inv.  Item Serial No.  Lot No. LRB No. Used Action   [facility] [facility] BREAST IMPLANT Other  [id number]  N/A Left 1 Implanted   [employer] [facility] BREAST IMPLANT Other  [id number]  N/A Right 1 Implanted       Left 520cc fill, right 500cc

## 2019-06-18 NOTE — DISCHARGE INSTRUCTIONS
Sofiya Kyle. Gena Steven M.D. POSTOPERATIVE INSTRUCTIONS    BREAST RECONSTRUCTION    1. You must have a responsible adult with you for the first 24 hours after surgery. 2. Please wear comfortable clothing the day of surgery. Do not wear clothing that pulls over your head. 3. You may start clear liquids once you have arrived home and continue until any nausea has subsided. Then you may advance to resume a normal diet. 4. Bed rest is recommended for the first 24 hours. You may be up for meals or go to the bathroom with assistance. Minimize arm extension and lifting for the first two weeks. 5. Wear the JULIETH hose until you are moving about normally, in 2-3 days. 6. Take your prescription medications as directed for the first 2-3 days. Then you may take it as needed. You have been prescribed________________________. Do not use any prescriptions with Aspirin or ibuprofen for 2 weeks. You may take non aspirin or non ibuprofen headache medications such as Tylenol Extra Strength. You may resume normal medications. 7. Do not drive or operate machinery while on pain medication. You may return to work ___________. You may drive___________. 8. Do not drink alcohol or take tranquilizers or take sleeping medications while on pain medication. 9. You may remove the bra and dressings in 24 hours and may shower. Wear the bra 24 hours a day for _____ days. You may remove it and wash and dry it and then reapply. 10. No lifting over 10 pounds for 2 weeks. No underwire bras. 11. You may find it more comfortable to sleep in a recliner for the first couple of nights. Do not sleep on your sides until your postop visit. 12. You may apply ice to the incision for the first 24 hours. If you have increasing pain and pain on one side or the other, with the feeling of tightness call the office at [phone].  You will need to return to the office for your postop appointment and suture removal. Your appointment is ___________. 14. You will be asked to begin massaging your breasts daily starting two weeks after surgery to keep the implant pockets loose and implants soft. This gives a good self exam also. Your physician will explain this procedure to you on postop visit. 15. Please call the office at [phone] if you have any questions after your surgery. 16. COMPLICATIONS:  Call the office at [phone] if any of the following occur-  Fever over 101 degrees taken by mouth or 100 degrees rectally. Pain not relieved by medications prescribed. Blood soaked dressing (small amounts of oozing may be normal.)  Unable to urinate or completely empty your bladder. Nausea or vomiting lasting longer than 24 hours. Numbness, tingling or cold fingers or toes. Swelling around incision. Increasing and progressive drainage from incision or exam site. Increased redness, warmth or hardness around incision or exam site  If you experience shortness of breath, chest pain or swelling of legs or feet, go immediately to the emergency room      IF YOU NEED IMMEDIATE ATTENTION, GO TO [address]. I acknowledge receipt of the discharge instructions:    Patient/Guardian Signature _______________________________________ Date___________________    Luciano [place] Signature_______________________________________________ Date___________________      DISCHARGE SUMMARY from Nurse    PATIENT INSTRUCTIONS:    After general anesthesia or intravenous sedation, for 24 hours or while taking prescription Narcotics:  · Limit your activities  · Do not drive and operate hazardous machinery  · Do not make important personal or business decisions  · Do  not drink alcoholic beverages  · If you have not urinated within 8 hours after discharge, please contact your surgeon on call.     Report the following to your surgeon:  · Excessive pain, swelling, redness or odor of or around the surgical area  · Temperature over 100.5  · Nausea and vomiting lasting longer than 4 hours or if unable to take medications  · Any signs of decreased circulation or nerve impairment to extremity: change in color, persistent  numbness, tingling, coldness or increase pain  · Any questions    What to do at Home:  Recommended activity: See surgical instructions, as noted above. If you experience any of the following symptoms noted above, please follow up with Dr. Jung Messina. *  Please give a list of your current medications to your Primary Care Provider. *  Please update this list whenever your medications are discontinued, doses are      changed, or new medications (including over-the-counter products) are added. *  Please carry medication information at all times in case of emergency situations. These are general instructions for a healthy lifestyle:    No smoking/ No tobacco products/ Avoid exposure to second hand smoke  Surgeon General's Warning:  Quitting smoking now greatly reduces serious risk to your health. Obesity, smoking, and sedentary lifestyle greatly increases your risk for illness    A healthy diet, regular physical exercise & weight monitoring are important for maintaining a healthy lifestyle    You may be retaining fluid if you have a history of heart failure or if you experience any of the following symptoms:  Weight gain of 3 pounds or more overnight or 5 pounds in a week, increased swelling in our hands or feet or shortness of breath while lying flat in bed. Please call your doctor as soon as you notice any of these symptoms; do not wait until your next office visit. The discharge information has been reviewed with the patient and caregiver. The patient and caregiver verbalized understanding.   Discharge medications reviewed with the patient and caregiver and appropriate educational materials and side effects teaching were provided. ___________________________________________________________________________________________________________________________________    Threeatt received Tylenol at 8:00 am; no Tylenol or Tylenol containing products until 2:00 pm or after.

## 2019-06-18 NOTE — ROUTINE PROCESS
Patient: Kwame Del Valle MRN: [id number]  SSN: [id number] YOB: 1975  Age: 40 y.o. Sex: female Patient is status post Procedure(s) with comments: 
REMOVE BILATERAL BREAST TISSUE EXPANDERS, REVISION OF RECONSTRUCTED BREASTS,  PLACE BILATERAL BREAST IMPLANTS, FAT GRAFTING FROM ABDOMEN TO BILATERAL BREASTS 
BREAST RECONSTRUCTION 
FAT GRAFTING - ABDOMEN = SOURCE SITE . Surgeon(s) and Role: * Amira Tamayo MD - Primary Local/Dose/Irrigation:   
 
             
Peripheral IV 06/18/19 Left Antecubital (Active) Site Assessment Clean, dry, & intact 6/18/2019  7:41 AM  
Phlebitis Assessment 0 6/18/2019  7:41 AM  
Infiltration Assessment 0 6/18/2019  7:41 AM  
Dressing Status Clean, dry, & intact 6/18/2019  7:41 AM  
Dressing Type Transparent 6/18/2019  7:41 AM  
Hub Color/Line Status Pink 6/18/2019  7:41 AM  
Alcohol Cap Used Yes 6/18/2019  7:41 AM  
        
Airway - Endotracheal Tube 06/18/19 Oral (Active) Dressing/Packing:  Wound Breast-Dressing Type: (EXOFIN, 4X4, ABD AND SURGIBRA) (06/18/19 0900) Splint/Cast:  ] Other:

## 2019-06-18 NOTE — ANESTHESIA PREPROCEDURE EVALUATION
Relevant Problems   No relevant active problems       Anesthetic History   No history of anesthetic complications            Review of Systems / Medical History  Patient summary reviewed, nursing notes reviewed and pertinent labs reviewed    Pulmonary  Within defined limits                 Neuro/Psych   Within defined limits           Cardiovascular  Within defined limits  Hypertension        Dysrhythmias            GI/Hepatic/Renal  Within defined limits              Endo/Other  Within defined limits  Diabetes         Other Findings              Physical Exam    Airway  Mallampati: II  TM Distance: > 6 cm  Neck ROM: normal range of motion   Mouth opening: Normal     Cardiovascular  Regular rate and rhythm,  S1 and S2 normal,  no murmur, click, rub, or gallop             Dental  No notable dental hx       Pulmonary  Breath sounds clear to auscultation               Abdominal  GI exam deferred       Other Findings            Anesthetic Plan    ASA: 2  Anesthesia type: general          Induction: Intravenous  Anesthetic plan and risks discussed with: Patient

## 2019-06-18 NOTE — ANESTHESIA POSTPROCEDURE EVALUATION
Post-Anesthesia Evaluation and Assessment    Patient: Harvey Feng MRN: [id number]  SSN: [id number]    YOB: 1975  Age: 40 y.o. Sex: female      I have evaluated the patient and they are stable and ready for discharge from the PACU. Cardiovascular Function/Vital Signs  Visit Vitals  /78   Pulse 75   Temp 35.1 °C (95.2 °F)   Resp 14   Ht 5' (1.524 m)   Wt 59.4 kg (131 lb)   SpO2 100%   BMI 25.58 kg/m²       Patient is status post General anesthesia for Procedure(s) with comments:  REMOVE BILATERAL BREAST TISSUE EXPANDERS, REVISION OF RECONSTRUCTED BREASTS,  PLACE BILATERAL BREAST IMPLANTS, FAT GRAFTING FROM ABDOMEN TO BILATERAL BREASTS  AND REMOVAL OF JOHAN CATH  BREAST RECONSTRUCTION  FAT GRAFTING - ABDOMEN = SOURCE SITE . Nausea/Vomiting: None    Postoperative hydration reviewed and adequate. Pain:  Pain Scale 1: Visual (06/18/19 1025)  Pain Intensity 1: 0 (06/18/19 1025)   Managed    Neurological Status:   Neuro (WDL): Exceptions to WDL (06/18/19 1025)  Neuro  Neurologic State: Anesthetized (06/18/19 1025)   At baseline    Mental Status, Level of Consciousness: Alert and  oriented to person, place, and time    Pulmonary Status:   O2 Device: CO2 nasal cannula (06/18/19 1032)   Adequate oxygenation and airway patent    Complications related to anesthesia: None    Post-anesthesia assessment completed. No concerns    Signed By: Rita Ty MD     June 18, 2019              Procedure(s):  REMOVE BILATERAL BREAST TISSUE EXPANDERS, REVISION OF RECONSTRUCTED BREASTS,  PLACE BILATERAL BREAST IMPLANTS, FAT GRAFTING FROM ABDOMEN TO BILATERAL BREASTS  AND REMOVAL OF JOHAN CATH  BREAST RECONSTRUCTION  FAT GRAFTING. general    <BSHSIANPOST>    Vitals Value Taken Time   /101 6/18/2019 10:35 AM   Temp 35.1 °C (95.2 °F) 6/18/2019 10:32 AM   Pulse 72 6/18/2019 10:36 AM   Resp 16 6/18/2019 10:36 AM   SpO2 100 % 6/18/2019 10:36 AM   Vitals shown include unvalidated device data.

## 2019-06-19 NOTE — OP NOTES
[address]  OPERATIVE REPORT    Name:  Khalida Caraballo  MR#:  [id number]  :  1975  ACCOUNT #:  [de-identified]  DATE OF SERVICE:  2019    PREOPERATIVE DIAGNOSIS:  Acquired absence of breasts, hx of breast cancer. POSTOPERATIVE DIAGNOSIS:  same. PROCEDURE PERFORMED:  1. Removal and placement of bilateral tissue expanders for saline implants. 2.  Revision of bilateral reconstructed breast.  3.  Bilateral fat grafting. 4.  Removal of left Port-A-Cath. SURGEON:  Lisa Gunn MD    ASSISTANT:  Barbie Rebolledo RN    ANESTHESIA:  General.    COMPLICATIONS:  None. SPECIMENS REMOVED:  Left and right mastectomy skin. IMPLANTS:  68 high-profile NATRELLE saline-filled breast implants. ESTIMATED BLOOD LOSS:  Minimal.    DRAINS AND TUBES:  None. 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 She is here today for removal and placement of tissue expanders. She wishes to go with saline implants. Prior to procedure, the risks and benefits of surgery were discussed with the patient. Informed consent was obtained. DESCRIPTION OF PROCEDURE:  On the day of surgery, the patient was seen in the preoperative holding area and the area was marked. She was taken to the operating room, placed supine on the operating table. After induction of general anesthesia, the arms were secured to arm boards and chest, abdomen, and flanks were prepped and draped in the field. Following preoperative time-out, I began the case. I made stab incisions in the lower lateral abdomen and cranial portion of the umbilicus and infiltrated the upper abdomen and flanks with tumescent solution. I then went to the right breast and I excised the mastectomy scar and removed the tissue expander and did the same thing on the left side.   I then placed a sizer on the right side and performed capsulotomies to release it laterally, inferiorly, and medially. I did the same thing on the right side. I did the same thing on the left side to match it. Once I was satisfied, it was 500 mL sizer, filled the 500, and this appeared to be very good size for the patient. I then harvested fat under low vacuum suction from the flanks and abdomen using a closed suction system with 60 mL cannulas under low vacuum pressure. I removed a total of 120 mL from each flank and 120 mL from the upper abdomen. Once this was done, the fat was allowed to separate by gravity and infranatant aqueous layer was drained off, leaving me with the fat. I then made stab incisions in the medial and lateral corners of the lower pole of the breast and infiltrated fat, placing 100 mL in each chest with the breast implant sizers in place in order to fill the areas that were deficient. I then removed the sizers and irrigated out the chest with triple antibiotic. I then used the implants. Please note at the medical record, the implants are as follows: They are style 68 high-profile Natrelle saline-filled breast implants. They are 500 mL size. On the right side, the reference number is 68HP-500 with a serial number [id number] and on the left, it is 68HP-500, serial number [id number]. I filled these up with 500 mL on the right side and a total of 520 on the left to match it. I then removed the fill valves after sitting the patient upright and confirming that the symmetry was good. Once I was satisfied, I then reapproximated the capsule using 3-0 PDS suture. I closed the inframammary approach with interrupted 2-0 Vicryl and running 3-0 PDS for the deep dermis and running subcuticular 2-0 Stratafix for the skin. The access incisions for both fat harvest and fat grafting were closed with 5-0 plain gut.   I then infiltrated a little bit of Marcaine in the left Port-A-Cath site, excised the scar, and dissected down the Port-A-Cath, put the patient back flat, and under Valsalva, I removed the Port-A-Cath. After holding pressure for a few minutes, this area was closed in layers and Dermabond was used to close to reinforce all the skin. The patient was placed in a bra as well as abdominal binder, extubated, and brought from the operating room to PACU in stable condition.       MD SUZIE Alcocer/[id number]/[email or web address]  D:  06/18/2019 10:11  T:  06/18/2019 13:49  JOB #:  [id number]

## 2019-07-22 ENCOUNTER — OFFICE VISIT (OUTPATIENT)
Dept: SURGERY | Age: 44
End: 2019-07-22

## 2019-07-22 VITALS
BODY MASS INDEX: 26.11 KG/M2 | DIASTOLIC BLOOD PRESSURE: 73 MMHG | WEIGHT: 133 LBS | HEART RATE: 82 BPM | HEIGHT: 60 IN | SYSTOLIC BLOOD PRESSURE: 140 MMHG

## 2019-07-22 DIAGNOSIS — C50.411 MALIGNANT NEOPLASM OF UPPER-OUTER QUADRANT OF RIGHT FEMALE BREAST, UNSPECIFIED ESTROGEN RECEPTOR STATUS (HCC): Primary | ICD-10-CM

## 2019-07-22 NOTE — PATIENT INSTRUCTIONS

## 2019-08-29 ENCOUNTER — TELEPHONE (OUTPATIENT)
Dept: NEUROLOGY | Age: 44
End: 2019-08-29

## 2019-08-29 NOTE — TELEPHONE ENCOUNTER
Spoke with patient and cancelled appointment for 10/03/19 to 12/05/19 at 3:40pm also mailed the patient an appointment reminder.

## 2019-10-11 ENCOUNTER — TELEPHONE (OUTPATIENT)
Dept: NEUROLOGY | Age: 44
End: 2019-10-11

## 2019-10-11 NOTE — TELEPHONE ENCOUNTER
----- Message from Scott Palma sent at 10/11/2019 11:57 AM EDT -----  Regarding: Dr. Lina Fernandes Message/Vendor Calls    Caller's first and last name: Frank Cuenca      Reason for call:sooner appt      Callback required yes/no and why:yes      Best contact number(s):[phone]      Details to clarify the request: Pt requested a sooner appt due to medication.       Scott Palma

## 2019-10-11 NOTE — TELEPHONE ENCOUNTER
Appointment Information   The following appointments have been successfully scheduled:   Date/time Wednesday, October 16, 2019 12:00 PM   Patient Tony Silva 1975 (63AY F) #[id number]   Department [facility] OFF[facility]-[address]   Appointment type Follow Up   Provider NIXON MARIN

## 2019-10-16 ENCOUNTER — OFFICE VISIT (OUTPATIENT)
Dept: NEUROLOGY | Age: 44
End: 2019-10-16

## 2019-10-16 VITALS
DIASTOLIC BLOOD PRESSURE: 72 MMHG | OXYGEN SATURATION: 100 % | BODY MASS INDEX: 27.09 KG/M2 | SYSTOLIC BLOOD PRESSURE: 130 MMHG | HEIGHT: 60 IN | HEART RATE: 97 BPM | WEIGHT: 138 LBS

## 2019-10-16 DIAGNOSIS — G43.009 MIGRAINE WITHOUT AURA AND WITHOUT STATUS MIGRAINOSUS, NOT INTRACTABLE: ICD-10-CM

## 2019-10-16 RX ORDER — BUTALBITAL, ACETAMINOPHEN AND CAFFEINE 50; 325; 40 MG/1; MG/1; MG/1
TABLET ORAL
Qty: 90 TAB | Refills: 3 | Status: SHIPPED | OUTPATIENT
Start: 2019-10-16 | End: 2020-06-25 | Stop reason: SDUPTHER

## 2019-10-16 RX ORDER — PROPRANOLOL HYDROCHLORIDE 160 MG/1
160 CAPSULE, EXTENDED RELEASE ORAL
Qty: 90 CAP | Refills: 3 | Status: SHIPPED | OUTPATIENT
Start: 2019-10-16 | End: 2020-03-11 | Stop reason: SDUPTHER

## 2019-10-16 NOTE — PATIENT INSTRUCTIONS
A Healthy Lifestyle: Care Instructions  Your Care Instructions    A healthy lifestyle can help you feel good, stay at a healthy weight, and have plenty of energy for both work and play. A healthy lifestyle is something you can share with your whole family. A healthy lifestyle also can lower your risk for serious health problems, such as high blood pressure, heart disease, and diabetes. You can follow a few steps listed below to improve your health and the health of your family. Follow-up care is a key part of your treatment and safety. Be sure to make and go to all appointments, and call your doctor if you are having problems. It's also a good idea to know your test results and keep a list of the medicines you take. How can you care for yourself at home? · Do not eat too much sugar, fat, or fast foods. You can still have dessert and treats now and then. The goal is moderation. · Start small to improve your eating habits. Pay attention to portion sizes, drink less juice and soda pop, and eat more fruits and vegetables. ? Eat a healthy amount of food. A 3-ounce serving of meat, for example, is about the size of a deck of cards. Fill the rest of your plate with vegetables and whole grains. ? Limit the amount of soda and sports drinks you have every day. Drink more water when you are thirsty. ? Eat at least 5 servings of fruits and vegetables every day. It may seem like a lot, but it is not hard to reach this goal. A serving or helping is 1 piece of fruit, 1 cup of vegetables, or 2 cups of leafy, raw vegetables. Have an apple or some carrot sticks as an afternoon snack instead of a candy bar. Try to have fruits and/or vegetables at every meal.  · Make exercise part of your daily routine. You may want to start with simple activities, such as walking, bicycling, or slow swimming. Try to be active 30 to 60 minutes every day. You do not need to do all 30 to 60 minutes all at once.  For example, you can exercise 3 times a day for 10 or 20 minutes. Moderate exercise is safe for most people, but it is always a good idea to talk to your doctor before starting an exercise program.  · Keep moving. Versie Richard the lawn, work in the garden, or [place]. Take the stairs instead of the elevator at work. · If you smoke, quit. People who smoke have an increased risk for heart attack, stroke, cancer, and other lung illnesses. Quitting is hard, but there are ways to boost your chance of quitting tobacco for good. ? Use nicotine gum, patches, or lozenges. ? Ask your doctor about stop-smoking programs and medicines. ? Keep trying. In addition to reducing your risk of diseases in the future, you will notice some benefits soon after you stop using tobacco. If you have shortness of breath or asthma symptoms, they will likely get better within a few weeks after you quit. · Limit how much alcohol you drink. Moderate amounts of alcohol (up to 2 drinks a day for men, 1 drink a day for women) are okay. But drinking too much can lead to liver problems, high blood pressure, and other health problems. Family health  If you have a family, there are many things you can do together to improve your health. · Eat meals together as a family as often as possible. · Eat healthy foods. This includes fruits, vegetables, lean meats and dairy, and whole grains. · Include your family in your fitness plan. Most people think of activities such as jogging or tennis as the way to fitness, but there are many ways you and your family can be more active. Anything that makes you breathe hard and gets your heart pumping is exercise. Here are some tips:  ? Walk to do errands or to take your child to school or the bus.  ? Go for a family bike ride after dinner instead of watching TV. Where can you learn more? Go to http://keily-tyson.info/. Enter E877 in the search box to learn more about \"A Healthy Lifestyle: Care Instructions. \"  Current as of: May 28, 2019  Content Version: 12.2  © 3080-8376 [employer]. Care instructions adapted under license by [facility] (which disclaims liability or warranty for this information). If you have questions about a medical condition or this instruction, always ask your healthcare professional. [address] any warranty or liability for your use of this information.

## 2019-10-16 NOTE — PROGRESS NOTES
Name: Phill Armenta    Chief Complaint: migraines    Returns for follow-up visit. Migraines are under good control however she feels that the propranolol not working as well as it did. We will increase her dose. Assesment and Plan    1. Intractable chronic migraine without aura and without status migrainosus  proponalol -increase to 160  Continue Fioricet    2. Breast cancer  S/p double mastectomy. 3. Sensory loss  Resolved      Allergies  Patient has no known allergies. Medications  Current Outpatient Medications   Medication Sig    cholecalciferol, vitamin D3, (VITAMIN D3) 2,000 unit tab Take 5,000 Units by mouth daily.  b complex vitamins tablet Take 1 Tab by mouth daily.  C/sourcherry/celery/grape seed (TART CHERRY PO) Take 2 Caps by mouth daily.  OTHER Take 1 Cap by mouth daily. Indications: POMEGRANTE COMPLETE    mv-mn/C/glutamin/lysin/lktw546 (AIRBORNE, ASCORBATE SODIUM, PO) Take 1 Gum by mouth daily.  propranolol LA (INDERAL LA) 60 mg SR capsule Take 2 Caps by mouth daily. (Patient taking differently: Take 120 mg by mouth nightly.)    tamoxifen (NOLVADEX) 20 mg tablet Take 20 mg by mouth nightly.  multivitamin (ONE A DAY) tablet Take 1 Tab by mouth daily. No current facility-administered medications for this visit. Medical History  Past Medical History:   Diagnosis Date    Arrhythmia     PALPITATIONS    Cancer ([address] 2018    BREAST, Right    Gestational diabetes     Headache     Headache 4/8/2019    Hypertension     Ill-defined condition     MIGRAINES    Palpitations     Prediabetes      Review of Systems   Constitutional: Positive for malaise/fatigue. Negative for chills and fever. HENT: Negative for ear pain. Eyes: Negative for pain and discharge. Respiratory: Negative for cough and hemoptysis. Cardiovascular: Negative for chest pain and claudication. Gastrointestinal: Negative for constipation and diarrhea.    Genitourinary: Negative for flank pain and hematuria. Musculoskeletal: Negative for back pain and myalgias. Skin: Negative for itching and rash. Neurological: Positive for sensory change and headaches. Endo/Heme/Allergies: Negative for environmental allergies. Does not bruise/bleed easily. Psychiatric/Behavioral: Negative for depression and hallucinations. Exam:    Visit Vitals  /72   Pulse 97   Ht 5' (1.524 m)   Wt 138 lb (62.6 kg)   SpO2 100%   BMI 26.95 kg/m²      General: Well developed, well nourished. Patient in no apparent distress   Head: Normocephalic, atraumatic, anicteric sclera   Neck Normal ROM, No thyromegally   Lungs:  Clear to auscultation bilaterally, No wheezes or rubs   Cardiac: Regular rate and rhythm with no murmurs. Abd: Bowel sounds were audible. No tenderness on palpation   Ext: No pedal edema   Skin: Supple no rash     NeurologicExam:  Mental Status: Alert and oriented to person place and time   Speech: Fluent no aphasia or dysarthria. Cranial Nerves:  II - XII Inact   Motor:  Full and symmetric strength of upper and lower proximal and distal muscles. Normal bulk and tone. Reflexes:   Deep tendon reflexes 2+/4 and symmetric. Sensory:   Symmetric and intact with no perceived deficits modalities involving small or large fibers. Gait:  Gait is balanced and fluid with normal arm swing. Tremor:   No tremor noted. Cerebellar:  Coordination intact. Neurovascular: No carotid bruits.  No JVD

## 2019-11-06 ENCOUNTER — OFFICE VISIT (OUTPATIENT)
Dept: INTERNAL MEDICINE CLINIC | Age: 44
End: 2019-11-06

## 2019-11-06 VITALS
RESPIRATION RATE: 18 BRPM | DIASTOLIC BLOOD PRESSURE: 82 MMHG | OXYGEN SATURATION: 100 % | SYSTOLIC BLOOD PRESSURE: 138 MMHG | WEIGHT: 138.2 LBS | HEIGHT: 60 IN | HEART RATE: 78 BPM | TEMPERATURE: 98.7 F | BODY MASS INDEX: 27.13 KG/M2

## 2019-11-06 DIAGNOSIS — I10 ESSENTIAL HYPERTENSION: Primary | Chronic | ICD-10-CM

## 2019-11-06 DIAGNOSIS — R51.9 NONINTRACTABLE HEADACHE, UNSPECIFIED CHRONICITY PATTERN, UNSPECIFIED HEADACHE TYPE: ICD-10-CM

## 2019-11-06 DIAGNOSIS — C50.411 MALIGNANT NEOPLASM OF UPPER-OUTER QUADRANT OF RIGHT BREAST IN FEMALE, ESTROGEN RECEPTOR POSITIVE (HCC): ICD-10-CM

## 2019-11-06 DIAGNOSIS — Z17.0 MALIGNANT NEOPLASM OF UPPER-OUTER QUADRANT OF RIGHT BREAST IN FEMALE, ESTROGEN RECEPTOR POSITIVE (HCC): ICD-10-CM

## 2019-11-06 NOTE — PATIENT INSTRUCTIONS

## 2019-11-06 NOTE — PROGRESS NOTES
Molina Vidales is a 40 y.o. female presenting for Hypertension (6 mo fu)  . 1. Have you been to the ER, urgent care clinic since your last visit? Hospitalized since your last visit? No    2. Have you seen or consulted any other health care providers outside of the [address] [place] [address] since your last visit? Include any pap smears or colon screening. No    No flowsheet data found. Abuse Screening Questionnaire 9/22/2016   Do you ever feel afraid of your partner? N   Are you in a relationship with someone who physically or mentally threatens you? N   Is it safe for you to go home? Y       3 most recent PHQ Screens 5/1/2019   Little interest or pleasure in doing things Not at all   Feeling down, depressed, irritable, or hopeless Not at all   Total Score PHQ 2 0       There are no discontinued medications.

## 2019-11-06 NOTE — PROGRESS NOTES
This note will not be viewable in [address] Subjective:     Mrs. Maricruz Almeida presents to the office today in general medical follow-up. The patient has hypertension for which she is on Inderal LA. She is tolerating this well denies any fatigue or palpitations. She does have a history of migraine headaches and this is helped immensely. She still has breakthrough headaches for which she takes Fioricet. She tolerates that without any fatigue. The patient denies any numbness tingling or focal neurological problems. Patient also continues to be followed by oncology for her right breast cancer and is currently on tamoxifen. She has had good reports when she visits the oncologist and has had no recurrence of symptoms.     Past Medical History:   Diagnosis Date    Arrhythmia     PALPITATIONS    Cancer ([address] 2018    BREAST, Right    Gestational diabetes     Headache     Headache 4/8/2019    Hypertension     Ill-defined condition     MIGRAINES    Palpitations     Prediabetes      Past Surgical History:   Procedure Laterality Date    HX BREAST BIOPSY Right 02/2018    HX BREAST RECONSTRUCTION Bilateral 4/10/2018    BREAST RECONSTRUCTION performed by Nohemy Stephens MD at [facility] HX BREAST RECONSTRUCTION Bilateral 6/18/2019    REMOVE BILATERAL BREAST TISSUE EXPANDERS, REVISION OF RECONSTRUCTED BREASTS,  PLACE BILATERAL BREAST IMPLANTS, FAT GRAFTING FROM ABDOMEN TO BILATERAL BREASTS  AND REMOVAL OF JHOAN CATH performed by Nohemy Stephens MD at [place] [address]    HX BREAST RECONSTRUCTION Bilateral 6/18/2019    BREAST RECONSTRUCTION performed by Nohemy Stephens MD at [address] [place] [address] MASTECTOMY Bilateral 4/10/2018    BILATERAL BREAST MASTECTOMIES, RIGHT BREAST SENTINEL NODE BIOPSY (SN 4-9 3:00), BILATERAL PRE-PEC VS SUB-PEC TISSUE EXPANDERS AND ALLODERM WITH DERMAL MATRIX, PORTACATH INSERTION performed by Comfort Etienne MD at [address] No Known Allergies  Current Outpatient Medications   Medication Sig Dispense Refill    propranolol LA (INDERAL LA) 160 mg capsule Take 1 Cap by mouth nightly. 90 Cap 3    butalbital-acetaminophen-caffeine (FIORICET, ESGIC) -40 mg per tablet Take one to two by mouth on the onset of migraine. Do not use more than 10 days in a 30 day period 90 Tab 3    cholecalciferol, vitamin D3, (VITAMIN D3) 2,000 unit tab Take 5,000 Units by mouth daily.  b complex vitamins tablet Take 1 Tab by mouth daily.  C/sourcherry/celery/grape seed (TART CHERRY PO) Take 2 Caps by mouth daily.  OTHER Take 1 Cap by mouth daily. Indications: POMEGRANTE COMPLETE      mv-mn/C/glutamin/lysin/sooh804 (AIRBORNE, ASCORBATE SODIUM, PO) Take 1 Gum by mouth daily.  tamoxifen (NOLVADEX) 20 mg tablet Take 20 mg by mouth nightly.  multivitamin (ONE A DAY) tablet Take 1 Tab by mouth daily.        Social History     Socioeconomic History    Marital status:      Spouse name: Not on file    Number of children: 1    Years of education: Not on file    Highest education level: Not on file   Occupational History    Occupation:    Tobacco Use    Smoking status: Never Smoker    Smokeless tobacco: Never Used   Substance and Sexual Activity    Alcohol use: No    Drug use: No     Family History   Problem Relation Age of Onset    Diabetes Mother     Asthma Mother     Hypertension Mother     Diabetes Father     Heart Disease Father     Glaucoma Father     No Known Problems Sister     Diabetes Brother     No Known Problems Sister     Hypertension Brother     Breast Cancer Other         66's    Anesth Problems Neg Hx        Review of Systems:  GEN: no weight loss, weight gain, fatigue or night sweats  CV: no PND, orthopnea, or palpitations  Resp: no dyspnea on exertion, no cough  Abd: no nausea, vomiting or diarrhea  EXT: denies edema, claudication  Endocrine: no hair loss, excessive thirst or polyuria  Neurological ROS: no TIA or stroke symptoms  ROS otherwise negative      Objective:     Visit Vitals  /82 (BP 1 Location: Left arm, BP Patient Position: Sitting)   Pulse 78   Temp 98.7 °F (37.1 °C) (Oral)   Resp 18   Ht 5' (1.524 m)   Wt 138 lb 3.2 oz (62.7 kg)   SpO2 100%   BMI 26.99 kg/m²     Body mass index is 26.99 kg/m². General:   alert, cooperative and no distress   Eyes: conjunctivae/sclerae clear. PERRL, EOM's intact   Mouth:  No oral lesions, no pharyngeal erythema, no exudates   Neck: Trachea midline, no thyromegaly, no bruits   Heart: S1 and S2 normal,no murmurs noted    Lungs: Clear to auscultation bilaterally, no increased work of breathing   Abdomen: Soft, nontender. Normal bowel sounds   Extremities: No edema or cyanosis   Neuro: ..alert, oriented x3,speech normal in context and clarity, cranial nerves II-XII intact,motor strength: full proximally and distally,gait: normal  reflexes: full and symmetric     Physical exam otherwise negative         Assessment/Plan:     Diagnoses and all orders for this visit:    Essential hypertension    Malignant neoplasm of upper-outer quadrant of right breast in female, estrogen receptor positive ([address]    Nonintractable headache, unspecified chronicity pattern, unspecified headache type        Other instructions: The patient's medications were reviewed and reconciled. No change in her current medical regimen is made. A no added salt, prudent diet and exercise is encouraged. Labs from 5/1 were reviewed with the patient today    Follow-up in 6 months    Follow-up and Dispositions    · Return in about 6 months (around 5/6/2020).          Jacuqi Dhillon MD

## 2020-01-28 ENCOUNTER — OFFICE VISIT (OUTPATIENT)
Dept: SURGERY | Age: 45
End: 2020-01-28

## 2020-01-28 VITALS
HEIGHT: 60 IN | BODY MASS INDEX: 26.11 KG/M2 | DIASTOLIC BLOOD PRESSURE: 60 MMHG | SYSTOLIC BLOOD PRESSURE: 131 MMHG | WEIGHT: 133 LBS | HEART RATE: 83 BPM

## 2020-01-28 DIAGNOSIS — Z90.13 S/P MASTECTOMY, BILATERAL: ICD-10-CM

## 2020-01-28 DIAGNOSIS — Z17.0 MALIGNANT NEOPLASM OF UPPER-OUTER QUADRANT OF RIGHT BREAST IN FEMALE, ESTROGEN RECEPTOR POSITIVE (HCC): Primary | ICD-10-CM

## 2020-01-28 DIAGNOSIS — C50.411 MALIGNANT NEOPLASM OF UPPER-OUTER QUADRANT OF RIGHT BREAST IN FEMALE, ESTROGEN RECEPTOR POSITIVE (HCC): Primary | ICD-10-CM

## 2020-01-28 DIAGNOSIS — Z85.3 HISTORY OF BREAST CANCER: ICD-10-CM

## 2020-01-28 NOTE — PROGRESS NOTES
HISTORY OF PRESENT ILLNESS  America Weston is a 40 y.o. female. HPI   ESTABLISHED patient here for follow-up of RIGHT breast cancer, S/P BILATERAL mastectomies with reconstruction. She is doing well. Has no complaints today. Still having a period on the Tamoxifen. Not feeling any new breast lumps. History of breast cancer-   Referring - Dr. Arjun Toscano  T1 N0 IDC Grade 2 Er/Pr+ Her 2 foreign eq right breast cancer.  HER2 positive by [address] [place] [address]  4/2018- bilateral mastectomy, RIGHT SLNB with reconstruction and port insertion - Dr. Justyn Sanford  Completed chemotherapy. Completed Herceptin in 4/2019. Currently taking Tamoxifen. Followed by Dr. Ioana Stiles    FH: Maternal cousin diagnosed with breast cancer in her 66's. OB History    No obstetric history on file. Obstetric Comments   Menarche:  15. LMP: 2/8/18. # of Children:  1. Age at Delivery of First Child:  25.   Hysterectomy/oophorectomy:  NO/NO. Breast Bx:  yes. Hx of Breast Feeding:  no. BCP:  yes.  Hormone therapy:  no.                Past Surgical History:   Procedure Laterality Date    HX BREAST BIOPSY Right 02/2018    HX BREAST RECONSTRUCTION Bilateral 4/10/2018    BREAST RECONSTRUCTION performed by Mckayla Poe MD at [facility] HX BREAST RECONSTRUCTION Bilateral 6/18/2019    REMOVE BILATERAL BREAST TISSUE EXPANDERS, REVISION OF RECONSTRUCTED BREASTS,  PLACE BILATERAL BREAST IMPLANTS, FAT GRAFTING FROM ABDOMEN TO BILATERAL BREASTS  AND REMOVAL OF JOHAN CATH performed by Mckayla Poe MD at [facility] HX BREAST RECONSTRUCTION Bilateral 6/18/2019    BREAST RECONSTRUCTION performed by Mckayla Poe MD at [facility] HX MASTECTOMY Bilateral 4/10/2018    BILATERAL BREAST MASTECTOMIES, RIGHT BREAST SENTINEL NODE BIOPSY (SN 4-9 3:00), BILATERAL PRE-PEC VS SUB-PEC TISSUE EXPANDERS AND ALLODERM WITH DERMAL MATRIX, PORTACATH INSERTION performed by Ruben Pearson MD at [address] [place] [address] EXTRACTION       Review of Systems   All other systems reviewed and are negative. ROS    Physical Exam  Constitutional:       Appearance: Normal appearance. Chest:      Breasts:         Right: Absent. Left: Absent. Comments: Chest wall s/p bilateral mastectomy with implant reconstruction  Musculoskeletal: Normal range of motion. Comments: UE x 2   Lymphadenopathy:      Upper Body:      Right upper body: No supraclavicular or axillary adenopathy. Left upper body: No supraclavicular or axillary adenopathy. Neurological:      Mental Status: She is alert. Psychiatric:         Attention and Perception: Attention normal.         Mood and Affect: Mood normal.         Speech: Speech normal.         Behavior: Behavior normal.       Visit Vitals  /60   Pulse 83   Ht 5' (1.524 m)   Wt 133 lb (60.3 kg)   LMP 01/14/2020   BMI 25.97 kg/m²     ASSESSMENT and PLAN  Encounter Diagnoses   Name Primary?  Malignant neoplasm of upper-outer quadrant of right breast in female, estrogen receptor positive ([address] Yes    S/P mastectomy, bilateral     History of breast cancer      Normal exam with no evidence of local recurrence. Reviewed s/sx of local recurrence. Continues follow-up with Dr. Susi Reeves. Reviewed follow-up care with our office and breast imaging on a PRN basis. RTC in 6 months and then I anticipate yearly visits. She is comfortable with this plan. All questions answered and she and her  stated understanding.

## 2020-03-06 ENCOUNTER — TELEPHONE (OUTPATIENT)
Dept: NEUROLOGY | Age: 45
End: 2020-03-06

## 2020-03-06 DIAGNOSIS — G43.009 MIGRAINE WITHOUT AURA AND WITHOUT STATUS MIGRAINOSUS, NOT INTRACTABLE: ICD-10-CM

## 2020-03-06 NOTE — TELEPHONE ENCOUNTER
Received a refill request for    Requested Prescriptions     Pending Prescriptions Disp Refills    propranolol LA (INDERAL LA) 160 mg capsule 90 Cap 3     Sig: Take 1 Cap by mouth nightly.      Future Appointments   Date Time Provider Dodie Sheridan   4/7/2020  3:00 PM Parminder Marroquin MD [address]   4/14/2020 11:20 AM Teresa Pate MD [address] [place] [address]   8/18/2020  4:30 PM Edilma French NP [facility]                         Last Appointment My Department:  10/16/2019    Please review

## 2020-03-06 NOTE — TELEPHONE ENCOUNTER
Patient called to request PA for propranolol 160 mg.  Patient would like a call back regding her rx sometime today to discuss her rx with the nurse

## 2020-03-09 ENCOUNTER — TELEPHONE (OUTPATIENT)
Dept: NEUROLOGY | Age: 45
End: 2020-03-09

## 2020-03-09 NOTE — TELEPHONE ENCOUNTER
----- Message from Kenneth Cabrera sent at 3/9/2020  1:50 PM EDT -----  Regarding: Dr. Ale Dempsey  Pt would like a call back regarding her refills.  Contact is [phone]

## 2020-03-09 NOTE — TELEPHONE ENCOUNTER
Reply from GENTRY. The patient's plan with [employer] termed on 12.31.19.  I ran a coverage check and he now has coverage through [employer]. If she wants to fill the med through [place], she'll need to see who administers his new plan. Please ask patient to fax new Rx card front and back or bring new card to our office.

## 2020-03-09 NOTE — TELEPHONE ENCOUNTER
Sent email to Anatoly today:     Anatoly,     Can you run this and see if it goes through okay? If so, when was last fill? Patients name is Nolaruthann Holley  [birth year] for the Propranolol 160 mg   90/90 days.

## 2020-03-10 ENCOUNTER — TELEPHONE (OUTPATIENT)
Dept: NEUROLOGY | Age: 45
End: 2020-03-10

## 2020-03-10 NOTE — TELEPHONE ENCOUNTER
Re: Propanolol     The Rx Bin on the card does not identify the pharmacy benefit manager. Used [email or web address] via CMM and coverage was verified. Sent PA - Status is pending. JAILENE JAQUEZ Key: [id number] help?  Call us at [phone]  Status  Sent to [employer]  Drug  Propranolol HCl ER 160MG er capsules  Form  [employer] DTE Prescription Drug Authorization Form

## 2020-03-10 NOTE — TELEPHONE ENCOUNTER
Propranolol PA     Called [employer] [place] - rep stated no PA is required. I submitted PA to [place] [employer] [place] to verify this information. JAILENE JAQUEZ Key: [id number] help? Call us at [phone]  Status  Sent to [employer]  Drug  Propranolol HCl ER 160MG er capsules  Form  [facility] Commercial Electronic Request Form (CB)    Regardless, the patient does not have [employer] coverage any longer. This was termed 12/31/19. Please contact pt to find out what local pharmacy she prefers and escribe the Rx to them.

## 2020-03-10 NOTE — TELEPHONE ENCOUNTER
Patient is calling again about her Propranol prescription    Future Appointments   Date Time Provider Dodie Fatimah   4/7/2020  3:00 PM Sotero Patel MD [address]   4/14/2020 11:20 AM Kenya Hill  [facility]   8/18/2020  4:30 PM Syed Nelson  [address]                         Last Appointment My Department:  10/16/2019

## 2020-03-10 NOTE — TELEPHONE ENCOUNTER
Response from [employer] [place]     Re: Propranolol -     Cancelled  Today:    NO PA REQUIRED FOR THIS TIER 2 MEDICATION-propranolol hcl cap er 24hr 160 mg (Inderal la)- TEST CLAIM PAID

## 2020-03-11 DIAGNOSIS — G43.009 MIGRAINE WITHOUT AURA AND WITHOUT STATUS MIGRAINOSUS, NOT INTRACTABLE: ICD-10-CM

## 2020-03-11 RX ORDER — PROPRANOLOL HYDROCHLORIDE 160 MG/1
160 CAPSULE, EXTENDED RELEASE ORAL
Qty: 90 CAP | Refills: 3 | Status: CANCELLED | OUTPATIENT
Start: 2020-03-11

## 2020-03-11 RX ORDER — PROPRANOLOL HYDROCHLORIDE 160 MG/1
160 CAPSULE, EXTENDED RELEASE ORAL
Qty: 90 CAP | Refills: 3 | Status: SHIPPED | OUTPATIENT
Start: 2020-03-11 | End: 2020-06-25 | Stop reason: SDUPTHER

## 2020-06-25 ENCOUNTER — OFFICE VISIT (OUTPATIENT)
Dept: NEUROLOGY | Age: 45
End: 2020-06-25

## 2020-06-25 VITALS
HEART RATE: 74 BPM | SYSTOLIC BLOOD PRESSURE: 112 MMHG | TEMPERATURE: 98.2 F | OXYGEN SATURATION: 99 % | HEIGHT: 60 IN | DIASTOLIC BLOOD PRESSURE: 70 MMHG | BODY MASS INDEX: 25.32 KG/M2 | WEIGHT: 129 LBS

## 2020-06-25 DIAGNOSIS — G43.009 MIGRAINE WITHOUT AURA AND WITHOUT STATUS MIGRAINOSUS, NOT INTRACTABLE: Primary | ICD-10-CM

## 2020-06-25 DIAGNOSIS — Z90.13 S/P MASTECTOMY, BILATERAL: ICD-10-CM

## 2020-06-25 RX ORDER — PROPRANOLOL HYDROCHLORIDE 160 MG/1
160 CAPSULE, EXTENDED RELEASE ORAL
Qty: 90 CAP | Refills: 3 | Status: SHIPPED | OUTPATIENT
Start: 2020-06-25 | End: 2021-06-30 | Stop reason: SDUPTHER

## 2020-06-25 RX ORDER — BUTALBITAL, ACETAMINOPHEN AND CAFFEINE 50; 325; 40 MG/1; MG/1; MG/1
TABLET ORAL
Qty: 90 TAB | Refills: 3 | Status: SHIPPED | OUTPATIENT
Start: 2020-06-25 | End: 2021-06-30 | Stop reason: SDUPTHER

## 2020-06-25 NOTE — PROGRESS NOTES
Name: Leanne Habermann    Chief Complaint: migraines    Patient returns for follow-up visit. She reports few migraines. She is content with current treatment. She is compliant with her medications. She has no further issues to discuss. Assesment and Plan    1. Intractable chronic migraine without aura and without status migrainosus  Continue propranolol  Continue Fioricet  Discussed the importance of avoiding rebound headaches    2. Breast cancer  S/p double mastectomy. Allergies  Patient has no known allergies. Medications  Current Outpatient Medications   Medication Sig    propranolol LA (INDERAL LA) 160 mg capsule Take 1 Cap by mouth nightly.  butalbital-acetaminophen-caffeine (FIORICET, ESGIC) -40 mg per tablet Take one to two by mouth on the onset of migraine. Do not use more than 10 days in a 30 day period    cholecalciferol, vitamin D3, (VITAMIN D3) 2,000 unit tab Take 5,000 Units by mouth daily.  b complex vitamins tablet Take 1 Tab by mouth daily.  C/sourcherry/celery/grape seed (TART CHERRY PO) Take 2 Caps by mouth daily.  OTHER Take 1 Cap by mouth daily. Indications: POMEGRANTE COMPLETE    mv-mn/C/glutamin/lysin/lpkm786 (AIRBORNE, ASCORBATE SODIUM, PO) Take 1 Gum by mouth daily.  tamoxifen (NOLVADEX) 20 mg tablet Take 20 mg by mouth nightly.  multivitamin (ONE A DAY) tablet Take 1 Tab by mouth daily. No current facility-administered medications for this visit. Medical History  Past Medical History:   Diagnosis Date    Arrhythmia     PALPITATIONS    Cancer ([facility] 2018    BREAST, Right    Gestational diabetes     Headache     Headache 4/8/2019    Hypertension     Ill-defined condition     MIGRAINES    Palpitations     Prediabetes      Review of Systems   Constitutional: Positive for malaise/fatigue. Negative for chills and fever. HENT: Negative for ear pain. Eyes: Negative for pain and discharge.    Respiratory: Negative for cough and hemoptysis. Cardiovascular: Negative for chest pain and claudication. Gastrointestinal: Negative for constipation and diarrhea. Genitourinary: Negative for flank pain and hematuria. Musculoskeletal: Negative for back pain and myalgias. Skin: Negative for itching and rash. Neurological: Positive for sensory change and headaches. Endo/Heme/Allergies: Negative for environmental allergies. Does not bruise/bleed easily. Psychiatric/Behavioral: Negative for depression and hallucinations. Exam:    Visit Vitals  /70   Pulse 74   Temp 98.2 °F (36.8 °C)   Ht 5' (1.524 m)   Wt 129 lb (58.5 kg)   SpO2 99%   BMI 25.19 kg/m²      General: Well developed, well nourished. Patient in no apparent distress   Head: Normocephalic, atraumatic, anicteric sclera   Neck Normal ROM, No thyromegally   Cardiac: Regular rate and rhythm   Ext: No pedal edema   Skin: Supple no rash     NeurologicExam:  Mental Status: Alert and oriented to person place and time   Speech: Fluent no aphasia or dysarthria. Cranial Nerves:  II - XII Intact   Motor:  Full and symmetric strength . Normal bulk and tone. Sensory:   Symmetric and intact    Gait:  Gait is balanced and fluid with normal arm swing. Tremor:   No tremor noted. Cerebellar:  Coordination intact.

## 2020-06-30 ENCOUNTER — OFFICE VISIT (OUTPATIENT)
Dept: INTERNAL MEDICINE CLINIC | Age: 45
End: 2020-06-30

## 2020-06-30 VITALS
HEIGHT: 60 IN | TEMPERATURE: 98.9 F | WEIGHT: 129.6 LBS | DIASTOLIC BLOOD PRESSURE: 82 MMHG | RESPIRATION RATE: 14 BRPM | OXYGEN SATURATION: 96 % | BODY MASS INDEX: 25.45 KG/M2 | SYSTOLIC BLOOD PRESSURE: 122 MMHG | HEART RATE: 66 BPM

## 2020-06-30 DIAGNOSIS — C50.411 MALIGNANT NEOPLASM OF UPPER-OUTER QUADRANT OF RIGHT BREAST IN FEMALE, ESTROGEN RECEPTOR POSITIVE (HCC): ICD-10-CM

## 2020-06-30 DIAGNOSIS — Z17.0 MALIGNANT NEOPLASM OF UPPER-OUTER QUADRANT OF RIGHT BREAST IN FEMALE, ESTROGEN RECEPTOR POSITIVE (HCC): ICD-10-CM

## 2020-06-30 DIAGNOSIS — I10 ESSENTIAL HYPERTENSION: Primary | Chronic | ICD-10-CM

## 2020-06-30 DIAGNOSIS — R51.9 NONINTRACTABLE HEADACHE, UNSPECIFIED CHRONICITY PATTERN, UNSPECIFIED HEADACHE TYPE: ICD-10-CM

## 2020-06-30 RX ORDER — AA/PROT/LYSINE/METHIO/VIT C/B6 50-12.5 MG
TABLET ORAL
COMMUNITY

## 2020-06-30 NOTE — PROGRESS NOTES
Argenis Sandoval is a 39 y.o. female presenting for Follow Up Chronic Condition (6 mo fu)  . 1. Have you been to the ER, urgent care clinic since your last visit? Hospitalized since your last visit? No    2. Have you seen or consulted any other health care providers outside of the [address] [place] [address] since your last visit? Include any pap smears or colon screening. Neurologist, Hematologist    No flowsheet data found. Abuse Screening Questionnaire 9/22/2016   Do you ever feel afraid of your partner? N   Are you in a relationship with someone who physically or mentally threatens you? N   Is it safe for you to go home? Y       3 most recent PHQ Screens 6/30/2020   Little interest or pleasure in doing things Not at all   Feeling down, depressed, irritable, or hopeless Not at all   Total Score PHQ 2 0       There are no discontinued medications.

## 2020-06-30 NOTE — PATIENT INSTRUCTIONS
DASH Diet: Care Instructions Your Care Instructions The DASH diet is an eating plan that can help lower your blood pressure. DASH stands for Dietary Approaches to Stop Hypertension. Hypertension is high blood pressure. The DASH diet focuses on eating foods that are high in calcium, potassium, and magnesium. These nutrients can lower blood pressure. The foods that are highest in these nutrients are fruits, vegetables, low-fat dairy products, nuts, seeds, and legumes. But taking calcium, potassium, and magnesium supplements instead of eating foods that are high in those nutrients does not have the same effect. The DASH diet also includes whole grains, fish, and poultry. The DASH diet is one of several lifestyle changes your doctor may recommend to lower your high blood pressure. Your doctor may also want you to decrease the amount of sodium in your diet. Lowering sodium while following the DASH diet can lower blood pressure even further than just the DASH diet alone. Follow-up care is a key part of your treatment and safety. Be sure to make and go to all appointments, and call your doctor if you are having problems. It's also a good idea to know your test results and keep a list of the medicines you take. How can you care for yourself at home? Following the DASH diet · Eat 4 to 5 servings of fruit each day. A serving is 1 medium-sized piece of fruit, ½ cup chopped or canned fruit, 1/4 cup dried fruit, or 4 ounces (½ cup) of fruit juice. Choose fruit more often than fruit juice. · Eat 4 to 5 servings of vegetables each day. A serving is 1 cup of lettuce or raw leafy vegetables, ½ cup of chopped or cooked vegetables, or 4 ounces (½ cup) of vegetable juice. Choose vegetables more often than vegetable juice. · Get 2 to 3 servings of low-fat and fat-free dairy each day. A serving is 8 ounces of milk, 1 cup of yogurt, or 1 ½ ounces of cheese. · Eat 6 to 8 servings of grains each day. A serving is 1 slice of bread, 1 ounce of dry cereal, or ½ cup of cooked rice, pasta, or cooked cereal. Try to choose whole-grain products as much as possible. · Limit lean meat, poultry, and fish to 2 servings each day. A serving is 3 ounces, about the size of a deck of cards. · Eat 4 to 5 servings of nuts, seeds, and legumes (cooked dried beans, lentils, and split peas) each week. A serving is 1/3 cup of nuts, 2 tablespoons of seeds, or ½ cup of cooked beans or peas. · Limit fats and oils to 2 to 3 servings each day. A serving is 1 teaspoon of vegetable oil or 2 tablespoons of salad dressing. · Limit sweets and added sugars to 5 servings or less a week. A serving is 1 tablespoon jelly or jam, ½ cup sorbet, or 1 cup of lemonade. · Eat less than 2,300 milligrams (mg) of sodium a day. If you limit your sodium to 1,500 mg a day, you can lower your blood pressure even more. Tips for success · Start small. Do not try to make dramatic changes to your diet all at once. You might feel that you are missing out on your favorite foods and then be more likely to not follow the plan. Make small changes, and stick with them. Once those changes become habit, add a few more changes. · Try some of the following: ? Make it a goal to eat a fruit or vegetable at every meal and at snacks. This will make it easy to get the recommended amount of fruits and vegetables each day. ? Try yogurt topped with fruit and nuts for a snack or healthy dessert. ? Add lettuce, tomato, cucumber, and onion to sandwiches. ? Combine a ready-made pizza crust with low-fat mozzarella cheese and lots of vegetable toppings. Try using tomatoes, squash, spinach, broccoli, carrots, cauliflower, and onions. ? Have a variety of cut-up vegetables with a low-fat dip as an appetizer instead of chips and dip. ? Sprinkle sunflower seeds or chopped almonds over salads.  Or try adding chopped walnuts or almonds to cooked vegetables. ? Try some vegetarian meals using beans and peas. Add garbanzo or kidney beans to salads. Make burritos and tacos with mashed sandoval beans or black beans. Where can you learn more? Go to http://keily-tyson.info/ Enter V156 in the search box to learn more about \"DASH Diet: Care Instructions. \" Current as of: December 16, 2019               Content Version: 12.5 © 1054-1163 [employer]. Care instructions adapted under license by [employer] (which disclaims liability or warranty for this information). If you have questions about a medical condition or this instruction, always ask your healthcare professional. [address] any warranty or liability for your use of this information.

## 2020-06-30 NOTE — PROGRESS NOTES
This note will not be viewable in [address] Subjective:     Ms. Gena Muhammad returns to the office today in general medical follow-up. The patient has hypertension and remains on Inderal LA. She is tolerating this without fatigue, palpitation, dizzy spells or lower extremity edema. Denies headaches, numbness, tingling or focal neurological problems. She has a history of recurrent headaches which has been helped by the Inderal LA. In addition to she takes Fioricet on a as needed basis for breakthrough symptoms. Over time her headaches have been gradually improving. The patient has a history of breast cancer of the right breast and is undergone bilateral mastectomy. She has been on tamoxifen since that time. She continues to be followed by oncology and has had no reoccurrence of her disease.     Past Medical History:   Diagnosis Date    Arrhythmia     PALPITATIONS    Cancer ([address] 2018    BREAST, Right    Gestational diabetes     Headache     Headache 4/8/2019    Hypertension     Ill-defined condition     MIGRAINES    Palpitations     Prediabetes      Past Surgical History:   Procedure Laterality Date    HX BREAST BIOPSY Right 02/2018    HX BREAST RECONSTRUCTION Bilateral 4/10/2018    BREAST RECONSTRUCTION performed by Tristan Shea MD at [address] HX BREAST RECONSTRUCTION Bilateral 6/18/2019    REMOVE BILATERAL BREAST TISSUE EXPANDERS, REVISION OF RECONSTRUCTED BREASTS,  PLACE BILATERAL BREAST IMPLANTS, FAT GRAFTING FROM ABDOMEN TO BILATERAL BREASTS  AND REMOVAL OF JOHAN CATH performed by Tristan Shea MD at [address] HX BREAST RECONSTRUCTION Bilateral 6/18/2019    BREAST RECONSTRUCTION performed by Tristan Shea MD at [address] HX MASTECTOMY Bilateral 4/10/2018    BILATERAL BREAST MASTECTOMIES, RIGHT BREAST SENTINEL NODE BIOPSY (SN 4-9 3:00), BILATERAL PRE-PEC VS SUB-PEC TISSUE EXPANDERS AND ALLODERM WITH DERMAL MATRIX, PORTACATH INSERTION performed by Nabor Reyna MD at [address] HX WISDOM TEETH EXTRACTION       No Known Allergies  Current Outpatient Medications   Medication Sig Dispense Refill    omega-3 fatty acids (FISH OIL CONCENTRATE PO) Take  by mouth daily.  coenzyme q10 (Co Q-10) 10 mg cap Take  by mouth.  propranolol LA (INDERAL LA) 160 mg capsule Take 1 Cap by mouth nightly. (Patient taking differently: Take 120 mg by mouth nightly.) 90 Cap 3    butalbital-acetaminophen-caffeine (FIORICET, ESGIC) -40 mg per tablet Take one to two by mouth on the onset of migraine. Do not use more than 10 days in a 30 day period 90 Tab 3    cholecalciferol, vitamin D3, (VITAMIN D3) 2,000 unit tab Take 5,000 Units by mouth daily.  b complex vitamins tablet Take 1 Tab by mouth daily.  C/sourcherry/celery/grape seed (TART CHERRY PO) Take 2 Caps by mouth daily.  OTHER Take 1 Cap by mouth daily. Indications: POMEGRANTE COMPLETE      mv-mn/C/glutamin/lysin/unjc000 (AIRBORNE, ASCORBATE SODIUM, PO) Take 1 Gum by mouth daily.  tamoxifen (NOLVADEX) 20 mg tablet Take 20 mg by mouth nightly.  multivitamin (ONE A DAY) tablet Take 1 Tab by mouth daily.        Social History     Socioeconomic History    Marital status:      Spouse name: Not on file    Number of children: 1    Years of education: Not on file    Highest education level: Not on file   Occupational History    Occupation:    Tobacco Use    Smoking status: Never Smoker    Smokeless tobacco: Never Used   Substance and Sexual Activity    Alcohol use: No    Drug use: No     Family History   Problem Relation Age of Onset    Diabetes Mother     Asthma Mother     Hypertension Mother     Diabetes Father     Heart Disease Father     Glaucoma Father     No Known Problems Sister     Diabetes Brother     No Known Problems Sister     Hypertension Brother     Breast Cancer Other         66's    Anesth Problems Neg Hx Review of Systems:  GEN: no weight loss, weight gain, fatigue or night sweats  CV: no PND, orthopnea, or palpitations  Resp: no dyspnea on exertion, no cough  Abd: no nausea, vomiting or diarrhea  EXT: denies edema, claudication  Endocrine: no hair loss, excessive thirst or polyuria  Neurological ROS: no TIA or stroke symptoms  ROS otherwise negative      Objective:     Visit Vitals  /82 (BP 1 Location: Left arm, BP Patient Position: Sitting)   Pulse 66   Temp 98.9 °F (37.2 °C) (Oral)   Resp 14   Ht 5' (1.524 m)   Wt 129 lb 9.6 oz (58.8 kg)   SpO2 96%   BMI 25.31 kg/m²     Body mass index is 25.31 kg/m². General:   alert, cooperative and no distress   Eyes: conjunctivae/sclerae clear. PERRL, EOM's intact   Mouth:  No oral lesions, no pharyngeal erythema, no exudates   Neck: Trachea midline, no thyromegaly, no bruits   Heart: S1 and S2 normal,no murmurs noted    Lungs: Clear to auscultation bilaterally, no increased work of breathing   Abdomen: Soft, nontender. Normal bowel sounds   Extremities: No edema or cyanosis   Neuro: ..alert, oriented x3,speech normal in context and clarity, cranial nerves II-XII intact,motor strength: full proximally and distally,gait: normal  reflexes: full and symmetric     Physical exam otherwise negative         Assessment/Plan:     Diagnoses and all orders for this visit:    Essential hypertension    Nonintractable headache, unspecified chronicity pattern, unspecified headache type    Malignant neoplasm of upper-outer quadrant of right breast in female, estrogen receptor positive ([facility] [address]        Other instructions:   Patient's medications were reviewed and reconciled. No change in her current medical regimen will be made. A no added salt, prudent diet is encouraged    She recently had labs at her oncologist and will have results of these labs forwarded to us.     Follow-up otherwise in 6 months time    Follow-up and Dispositions    · Return in about 6 months (around 12/30/2020). Candace Hermosillo MD    Please note that this dictation was completed with [employer], the computer voice recognition software. Quite often unanticipated grammatical, syntax, homophones, and other interpretive errors are inadvertently transcribed by the computer software. Please disregard these errors. Please excuse any errors that have escaped final proofreading.

## 2020-09-29 ENCOUNTER — OFFICE VISIT (OUTPATIENT)
Dept: SURGERY | Age: 45
End: 2020-09-29
Payer: COMMERCIAL

## 2020-09-29 VITALS
DIASTOLIC BLOOD PRESSURE: 78 MMHG | HEIGHT: 60 IN | HEART RATE: 88 BPM | WEIGHT: 129 LBS | SYSTOLIC BLOOD PRESSURE: 129 MMHG | TEMPERATURE: 98 F | BODY MASS INDEX: 25.32 KG/M2

## 2020-09-29 DIAGNOSIS — Z17.0 MALIGNANT NEOPLASM OF UPPER-OUTER QUADRANT OF RIGHT BREAST IN FEMALE, ESTROGEN RECEPTOR POSITIVE (HCC): Primary | ICD-10-CM

## 2020-09-29 DIAGNOSIS — C50.411 MALIGNANT NEOPLASM OF UPPER-OUTER QUADRANT OF RIGHT BREAST IN FEMALE, ESTROGEN RECEPTOR POSITIVE (HCC): Primary | ICD-10-CM

## 2020-09-29 DIAGNOSIS — Z85.3 HISTORY OF BREAST CANCER: ICD-10-CM

## 2020-09-29 DIAGNOSIS — Z90.13 S/P MASTECTOMY, BILATERAL: ICD-10-CM

## 2020-09-29 PROCEDURE — 99213 OFFICE O/P EST LOW 20 MIN: CPT | Performed by: NURSE PRACTITIONER

## 2020-09-29 NOTE — PROGRESS NOTES
HISTORY OF PRESENT ILLNESS  America Platt is a 39 y.o. female. HPI ESTABLISHED patient here for follow-up of RIGHT breast cancer, S/P BILATERAL mastectomies with reconstruction. No breast complaints or breast pain.         History of breast cancer-   Referring - Dr. Alexis Paz  T1 N0 IDC Grade 2 Er/Pr+ Her 2 foreign equivocal right breast cancer.  HER2 positive by [facility]  4/2018- bilateral mastectomy, RIGHT SLNB with reconstruction and port insertion - Dr. Brittany Leal  Completed chemotherapy. Completed Herceptin in 4/2019. Currently taking Tamoxifen. Followed by Dr. Yadi Pelletier     FH: Maternal cousin diagnosed with breast cancer in her 66's. OB History    No obstetric history on file. Obstetric Comments   Menarche:  15. LMP: 2/8/18. # of Children:  1. Age at Delivery of First Child:  25.   Hysterectomy/oophorectomy:  NO/NO. Breast Bx:  yes. Hx of Breast Feeding:  no. BCP:  yes.  Hormone therapy:  no.                Past Surgical History:   Procedure Laterality Date    HX BREAST BIOPSY Right 02/2018    HX BREAST RECONSTRUCTION Bilateral 4/10/2018    BREAST RECONSTRUCTION performed by Jamie Castellanos MD at [place] [address] HX BREAST RECONSTRUCTION Bilateral 6/18/2019    REMOVE BILATERAL BREAST TISSUE EXPANDERS, REVISION OF RECONSTRUCTED BREASTS,  PLACE BILATERAL BREAST IMPLANTS, FAT GRAFTING FROM ABDOMEN TO BILATERAL BREASTS  AND REMOVAL OF JOHAN CATH performed by Jamie Castellanos MD at [place] [address] HX BREAST RECONSTRUCTION Bilateral 6/18/2019    BREAST RECONSTRUCTION performed by Jamie Castellanos MD at [place] [address] HX MASTECTOMY Bilateral 4/10/2018    BILATERAL BREAST MASTECTOMIES, RIGHT BREAST SENTINEL NODE BIOPSY (SN 4-9 3:00), BILATERAL PRE-PEC VS SUB-PEC TISSUE EXPANDERS AND ALLODERM WITH DERMAL MATRIX, PORTACATH INSERTION performed by Rolando Jasmine MD at [place] [address] HX WISDOM TEETH EXTRACTION         ROS    Physical Exam  Constitutional:       Appearance: Normal appearance. Chest:      Breasts:         Right: Absent. Left: Absent. Comments: Chest wall s/p bilateral mastectomy with implant reconstruction  Musculoskeletal: Normal range of motion. Comments: UE x 2   Lymphadenopathy:      Upper Body:      Right upper body: No supraclavicular or axillary adenopathy. Left upper body: No supraclavicular or axillary adenopathy. Neurological:      Mental Status: She is alert. Psychiatric:         Attention and Perception: Attention normal.         Mood and Affect: Mood normal.         Speech: Speech normal.         Behavior: Behavior normal.       Visit Vitals  /78 (BP 1 Location: Left arm, BP Patient Position: Sitting)   Pulse 88   Temp 98 °F (36.7 °C) (Skin)   Ht 5' (1.524 m)   Wt 129 lb (58.5 kg)   BMI 25.19 kg/m²       ASSESSMENT and PLAN  Encounter Diagnoses   Name Primary?  Malignant neoplasm of upper-outer quadrant of right breast in female, estrogen receptor positive ([address] Yes    S/P mastectomy, bilateral     History of breast cancer       Normal exam with no evidence of local recurrence. Reviewed s/sx of local recurrence. Continues follow-up with Dr. Shanna Pagan. Reviewed follow-up care with our office and breast imaging on a PRN basis. RTC in 6 months and then I anticipate yearly visits. She is comfortable with this plan. All questions answered and she and her  stated understanding.

## 2020-09-29 NOTE — PATIENT INSTRUCTIONS
Breast Self-Exam: Care Instructions  Your Care Instructions     A breast self-exam is when you check your breasts for lumps or changes. This regular exam helps you learn how your breasts normally look and feel. Most breast problems or changes are not because of cancer. Breast self-exam is not a substitute for a mammogram. Having regular breast exams by your doctor and regular mammograms improve your chances of finding any problems with your breasts. Some women set a time each month to do a step-by-step breast self-exam. Other women like a less formal system. They might look at their breasts as they brush their teeth, or feel their breasts once in a while in the shower. If you notice a change in your breast, tell your doctor. Follow-up care is a key part of your treatment and safety. Be sure to make and go to all appointments, and call your doctor if you are having problems. It's also a good idea to know your test results and keep a list of the medicines you take. How do you do a breast self-exam?  · The best time to examine your breasts is usually one week after your menstrual period begins. Your breasts should not be tender then. If you do not have periods, you might do your exam on a day of the month that is easy to remember. · To examine your breasts:  ? Remove all your clothes above the waist and lie down. When you are lying down, your breast tissue spreads evenly over your chest wall, which makes it easier to feel all your breast tissue. ? Use the pads--not the fingertips--of the 3 middle fingers of your left hand to check your right breast. Move your fingers slowly in small coin-sized circles that overlap. ? Use three levels of pressure to feel of all your breast tissue. Use light pressure to feel the tissue close to the skin surface. Use medium pressure to feel a little deeper. Use firm pressure to feel your tissue close to your breastbone and ribs.  Use each pressure level to feel your breast tissue before moving on to the next spot. ? Check your entire breast, moving up and down as if following a strip from the collarbone to the bra line, and from the armpit to the ribs. Repeat until you have covered the entire breast.  ? Repeat this procedure for your left breast, using the pads of the 3 middle fingers of your right hand. · To examine your breasts while in the shower:  ? Place one arm over your head and lightly soap your breast on that side. ? Using the pads of your fingers, gently move your hand over your breast (in the strip pattern described above), feeling carefully for any lumps or changes. ? Repeat for the other breast.  · Have your doctor inspect anything you notice to see if you need further testing. Where can you learn more? Go to http://www.gray.com/  Enter P148 in the search box to learn more about \"Breast Self-Exam: Care Instructions. \"  Current as of: April 29, 2020               Content Version: 12.6  © 2006-2020 [employer]. Care instructions adapted under license by [employer] (which disclaims liability or warranty for this information). If you have questions about a medical condition or this instruction, always ask your healthcare professional. [place] [address] any warranty or liability for your use of this information. Eating Healthy Foods: Care Instructions  Your Care Instructions     Eating healthy foods can help lower your risk for disease. Healthy food gives you energy and keeps your heart strong, your brain active, your muscles working, and your bones strong. A healthy diet includes a variety of foods from the basic food groups: grains, vegetables, fruits, milk and milk products, and meat and beans. Some people may eat more of their favorite foods from only one food group and, as a result, miss getting the nutrients they need.  So, it is important to pay attention not only to what you eat but also to what you are missing from your diet. You can eat a healthy, balanced diet by making a few small changes. Follow-up care is a key part of your treatment and safety. Be sure to make and go to all appointments, and call your doctor if you are having problems. It's also a good idea to know your test results and keep a list of the medicines you take. How can you care for yourself at home? Look at what you eat  · Keep a food diary for a week or two and record everything you eat or drink. Track the number of servings you eat from each food group. · For a balanced diet every day, eat a variety of:  ? 6 or more ounce-equivalents of grains, such as cereals, breads, crackers, rice, or pasta, every day. An ounce-equivalent is 1 slice of bread, 1 cup of ready-to-eat cereal, or ½ cup of cooked rice, cooked pasta, or cooked cereal.  ? 2½ cups of vegetables, especially:  § Dark-green vegetables such as broccoli and spinach. § Orange vegetables such as carrots and sweet potatoes. § Dry beans (such as sandoval and kidney beans) and peas (such as lentils). ? 2 cups of fresh, frozen, or canned fruit. A small apple or 1 banana or orange equals 1 cup. ? 3 cups of nonfat or low-fat milk, yogurt, or other milk products. ? 5½ ounces of meat and beans, such as chicken, fish, lean meat, beans, nuts, and seeds. One egg, 1 tablespoon of peanut butter, ½ ounce nuts or seeds, or ¼ cup of cooked beans equals 1 ounce of meat. · Learn how to read food labels for serving sizes and ingredients. Fast-food and convenience-food meals often contain few or no fruits or vegetables. Make sure you eat some fruits and vegetables to make the meal more nutritious. · Look at your food diary. For each food group, add up what you have eaten and then divide the total by the number of days. This will give you an idea of how much you are eating from each food group. See if you can find some ways to change your diet to make it more healthy.   Start small  · Do not try to make dramatic changes to your diet all at once. You might feel that you are missing out on your favorite foods and then be more likely to fail. · Start slowly, and gradually change your habits. Try some of the following:  ? Use whole wheat bread instead of white bread. ? Use nonfat or low-fat milk instead of whole milk. ? Eat brown rice instead of white rice, and eat whole wheat pasta instead of white-flour pasta. ? Try low-fat cheeses and low-fat yogurt. ? Add more fruits and vegetables to meals and have them for snacks. ? Add lettuce, tomato, cucumber, and onion to sandwiches. ? Add fruit to yogurt and cereal.  Enjoy food  · You can still eat your favorite foods. You just may need to eat less of them. If your favorite foods are high in fat, salt, and sugar, limit how often you eat them, but do not cut them out entirely. · Eat a wide variety of foods. Make healthy choices when eating out  · The type of restaurant you choose can help you make healthy choices. Even fast-food chains are now offering more low-fat or healthier choices on the menu. · Choose smaller portions, or take half of your meal home. · When eating out, try:  ? A veggie pizza with a whole wheat crust or grilled chicken (instead of sausage or pepperoni). ? Pasta with roasted vegetables, grilled chicken, or marinara sauce instead of cream sauce. ? A vegetable wrap or grilled chicken wrap. ? Broiled or poached food instead of fried or breaded items. Make healthy choices easy  · Buy packaged, prewashed, ready-to-eat fresh vegetables and fruits, such as baby carrots, salad mixes, and chopped or shredded broccoli and cauliflower. · Buy packaged, presliced fruits, such as melon or pineapple. · Choose 100% fruit or vegetable juice instead of soda. Limit juice intake to 4 to 6 oz (½ to ¾ cup) a day. · Blend low-fat yogurt, fruit juice, and canned or frozen fruit to make a smoothie for breakfast or a snack.   Where can you learn more? Go to http://keily-tyson.info/  Enter T756 in the search box to learn more about \"Eating Healthy Foods: Care Instructions. \"  Current as of: August 22, 2019               Content Version: 12.6  © 6600-8973 [employer]. Care instructions adapted under license by [employer] (which disclaims liability or warranty for this information). If you have questions about a medical condition or this instruction, always ask your healthcare professional. [address] any warranty or liability for your use of this information.

## 2020-12-30 ENCOUNTER — OFFICE VISIT (OUTPATIENT)
Dept: INTERNAL MEDICINE CLINIC | Age: 45
End: 2020-12-30
Payer: COMMERCIAL

## 2020-12-30 VITALS
DIASTOLIC BLOOD PRESSURE: 76 MMHG | OXYGEN SATURATION: 98 % | HEIGHT: 60 IN | RESPIRATION RATE: 18 BRPM | WEIGHT: 118.8 LBS | HEART RATE: 93 BPM | BODY MASS INDEX: 23.32 KG/M2 | SYSTOLIC BLOOD PRESSURE: 122 MMHG | TEMPERATURE: 98.6 F

## 2020-12-30 DIAGNOSIS — Z90.13 S/P MASTECTOMY, BILATERAL: ICD-10-CM

## 2020-12-30 DIAGNOSIS — R51.9 NONINTRACTABLE HEADACHE, UNSPECIFIED CHRONICITY PATTERN, UNSPECIFIED HEADACHE TYPE: ICD-10-CM

## 2020-12-30 DIAGNOSIS — N39.0 URINARY TRACT INFECTION WITHOUT HEMATURIA, SITE UNSPECIFIED: ICD-10-CM

## 2020-12-30 DIAGNOSIS — I10 ESSENTIAL HYPERTENSION: Primary | Chronic | ICD-10-CM

## 2020-12-30 DIAGNOSIS — C50.411 MALIGNANT NEOPLASM OF UPPER-OUTER QUADRANT OF RIGHT FEMALE BREAST, UNSPECIFIED ESTROGEN RECEPTOR STATUS (HCC): ICD-10-CM

## 2020-12-30 LAB
25(OH)D3 SERPL-MCNC: 118 NG/ML (ref 30–96)
A-G RATIO,AGRAT: 1.3 RATIO
ALBUMIN SERPL-MCNC: 4.3 G/DL (ref 3.9–5.4)
ALP SERPL-CCNC: 47 U/L (ref 38–126)
ALT SERPL-CCNC: 11 U/L (ref 0–35)
ANION GAP SERPL CALC-SCNC: 12 MMOL/L
AST SERPL W P-5'-P-CCNC: 26 U/L (ref 14–36)
BACTERIA,BACTU: ABNORMAL
BILIRUB SERPL-MCNC: 0.8 MG/DL (ref 0.2–1.3)
BILIRUB UR QL: NEGATIVE
BUN SERPL-MCNC: 20 MG/DL (ref 7–17)
BUN/CREATININE RATIO,BUCR: 20 RATIO
CALCIUM SERPL-MCNC: 9.9 MG/DL (ref 8.4–10.2)
CHLORIDE SERPL-SCNC: 103 MMOL/L (ref 98–107)
CHOL/HDL RATIO,CHHD: 4 RATIO (ref 0–4)
CHOLEST SERPL-MCNC: 188 MG/DL (ref 0–200)
CLARITY: CLEAR
CO2 SERPL-SCNC: 25 MMOL/L (ref 22–32)
COLOR UR: ABNORMAL
CREAT SERPL-MCNC: 1 MG/DL (ref 0.7–1.2)
GLOBULIN,GLOB: 3.3
GLUCOSE 24H UR-MRATE: NEGATIVE G/(24.H)
GLUCOSE SERPL-MCNC: 120 MG/DL (ref 65–105)
HDLC SERPL-MCNC: 52 MG/DL (ref 35–130)
HGB UR QL STRIP: NEGATIVE
KETONES UR QL STRIP.AUTO: NEGATIVE
LDL/HDL RATIO,LDHD: 2 RATIO
LDLC SERPL CALC-MCNC: 118 MG/DL (ref 0–130)
LEUKOCYTE ESTERASE: NEGATIVE
NITRITE UR QL STRIP.AUTO: NEGATIVE
PH UR STRIP: 7 [PH] (ref 5–7)
POTASSIUM SERPL-SCNC: 4.3 MMOL/L (ref 3.6–5)
PROT SERPL-MCNC: 7.6 G/DL (ref 6.3–8.2)
PROT UR STRIP-MCNC: NEGATIVE MG/DL
RBC #/AREA URNS HPF: 0 #/HPF
SODIUM SERPL-SCNC: 140 MMOL/L (ref 137–145)
SP GR UR REFRACTOMETRY: 1.01 (ref 1–1.03)
SQUAMOUS EPITHELIAL CELLS: ABNORMAL
TRIGL SERPL-MCNC: 89 MG/DL (ref 0–200)
TSH SERPL DL<=0.05 MIU/L-ACNC: 0.75 UIU/ML (ref 0.34–5.6)
UROBILINOGEN UR QL STRIP.AUTO: NEGATIVE
VLDLC SERPL CALC-MCNC: 18 MG/DL
WBC URNS QL MICRO: 0 #/HPF

## 2020-12-30 PROCEDURE — 80053 COMPREHEN METABOLIC PANEL: CPT | Performed by: INTERNAL MEDICINE

## 2020-12-30 PROCEDURE — 82306 VITAMIN D 25 HYDROXY: CPT | Performed by: INTERNAL MEDICINE

## 2020-12-30 PROCEDURE — 84443 ASSAY THYROID STIM HORMONE: CPT | Performed by: INTERNAL MEDICINE

## 2020-12-30 PROCEDURE — 81001 URINALYSIS AUTO W/SCOPE: CPT | Performed by: INTERNAL MEDICINE

## 2020-12-30 PROCEDURE — 80061 LIPID PANEL: CPT | Performed by: INTERNAL MEDICINE

## 2020-12-30 PROCEDURE — 36415 COLL VENOUS BLD VENIPUNCTURE: CPT | Performed by: INTERNAL MEDICINE

## 2020-12-30 PROCEDURE — 99214 OFFICE O/P EST MOD 30 MIN: CPT | Performed by: INTERNAL MEDICINE

## 2020-12-30 NOTE — PATIENT INSTRUCTIONS
DASH Diet: Care Instructions Your Care Instructions The DASH diet is an eating plan that can help lower your blood pressure. DASH stands for Dietary Approaches to Stop Hypertension. Hypertension is high blood pressure. The DASH diet focuses on eating foods that are high in calcium, potassium, and magnesium. These nutrients can lower blood pressure. The foods that are highest in these nutrients are fruits, vegetables, low-fat dairy products, nuts, seeds, and legumes. But taking calcium, potassium, and magnesium supplements instead of eating foods that are high in those nutrients does not have the same effect. The DASH diet also includes whole grains, fish, and poultry. The DASH diet is one of several lifestyle changes your doctor may recommend to lower your high blood pressure. Your doctor may also want you to decrease the amount of sodium in your diet. Lowering sodium while following the DASH diet can lower blood pressure even further than just the DASH diet alone. Follow-up care is a key part of your treatment and safety. Be sure to make and go to all appointments, and call your doctor if you are having problems. It's also a good idea to know your test results and keep a list of the medicines you take. How can you care for yourself at home? Following the DASH diet · Eat 4 to 5 servings of fruit each day. A serving is 1 medium-sized piece of fruit, ½ cup chopped or canned fruit, 1/4 cup dried fruit, or 4 ounces (½ cup) of fruit juice. Choose fruit more often than fruit juice. · Eat 4 to 5 servings of vegetables each day. A serving is 1 cup of lettuce or raw leafy vegetables, ½ cup of chopped or cooked vegetables, or 4 ounces (½ cup) of vegetable juice. Choose vegetables more often than vegetable juice. · Get 2 to 3 servings of low-fat and fat-free dairy each day. A serving is 8 ounces of milk, 1 cup of yogurt, or 1 ½ ounces of cheese. · Eat 6 to 8 servings of grains each day. A serving is 1 slice of bread, 1 ounce of dry cereal, or ½ cup of cooked rice, pasta, or cooked cereal. Try to choose whole-grain products as much as possible. · Limit lean meat, poultry, and fish to 2 servings each day. A serving is 3 ounces, about the size of a deck of cards. · Eat 4 to 5 servings of nuts, seeds, and legumes (cooked dried beans, lentils, and split peas) each week. A serving is 1/3 cup of nuts, 2 tablespoons of seeds, or ½ cup of cooked beans or peas. · Limit fats and oils to 2 to 3 servings each day. A serving is 1 teaspoon of vegetable oil or 2 tablespoons of salad dressing. · Limit sweets and added sugars to 5 servings or less a week. A serving is 1 tablespoon jelly or jam, ½ cup sorbet, or 1 cup of lemonade. · Eat less than 2,300 milligrams (mg) of sodium a day. If you limit your sodium to 1,500 mg a day, you can lower your blood pressure even more. Tips for success · Start small. Do not try to make dramatic changes to your diet all at once. You might feel that you are missing out on your favorite foods and then be more likely to not follow the plan. Make small changes, and stick with them. Once those changes become habit, add a few more changes. · Try some of the following: ? Make it a goal to eat a fruit or vegetable at every meal and at snacks. This will make it easy to get the recommended amount of fruits and vegetables each day. ? Try yogurt topped with fruit and nuts for a snack or healthy dessert. ? Add lettuce, tomato, cucumber, and onion to sandwiches. ? Combine a ready-made pizza crust with low-fat mozzarella cheese and lots of vegetable toppings. Try using tomatoes, squash, spinach, broccoli, carrots, cauliflower, and onions. ? Have a variety of cut-up vegetables with a low-fat dip as an appetizer instead of chips and dip. ? Sprinkle sunflower seeds or chopped almonds over salads. Or try adding chopped walnuts or almonds to cooked vegetables. ? Try some vegetarian meals using beans and peas. Add garbanzo or kidney beans to salads. Make burritos and tacos with mashed sandoval beans or black beans. Where can you learn more? Go to http://www.gray.com/ Enter L805 in the search box to learn more about \"DASH Diet: Care Instructions. \" Current as of: December 16, 2019               Content Version: 12.6 © 9747-0787 [employer]. Care instructions adapted under license by [employer] (which disclaims liability or warranty for this information). If you have questions about a medical condition or this instruction, always ask your healthcare professional. [address] any warranty or liability for your use of this information.

## 2020-12-30 NOTE — PROGRESS NOTES
Arianna Byers is a 39 y.o. female and presents with Follow Up Chronic Condition (6 mo fu)  . Subjective:  Ms. Bishop Bradley returns to the office today in follow-up of multiple medical problems. The patient has hypertension currently on Inderal LA. She is tolerating this without fatigue, palpitations or orthostatic dizziness. She has had no numbness, tingling or focal neurological problems. She has recurrent headaches which appeared to be tension vascular in nature. She does take Fioricet on a as needed basis and finds this effective in regards to alleviating her headaches. She has noted that the incidence and severity of the headaches has decreased over time. She is also followed for history of breast cancer of the right breast.  She is followed by Dr. Veronika Reddy who she saw yesterday. She has had previous bilateral mastectomies. She remains on tamoxifen and has had no recurrence of her disease.     Past Medical History:   Diagnosis Date    Arrhythmia     PALPITATIONS    Cancer ([address] 2018    BREAST, Right    Gestational diabetes     Headache     Headache 4/8/2019    Hypertension     Ill-defined condition     MIGRAINES    Palpitations     Prediabetes      Past Surgical History:   Procedure Laterality Date    HX BREAST BIOPSY Right 02/2018    HX BREAST RECONSTRUCTION Bilateral 4/10/2018    BREAST RECONSTRUCTION performed by Niel Kehr, MD at [address]X BREAST RECONSTRUCTION Bilateral 6/18/2019    REMOVE BILATERAL BREAST TISSUE EXPANDERS, REVISION OF RECONSTRUCTED BREASTS,  PLACE BILATERAL BREAST IMPLANTS, FAT GRAFTING FROM ABDOMEN TO BILATERAL BREASTS  AND REMOVAL OF JOHAN CATH performed by Niel Kehr, MD at [address] HX BREAST RECONSTRUCTION Bilateral 6/18/2019    BREAST RECONSTRUCTION performed by Niel Kehr, MD at [address] HX MASTECTOMY Bilateral 4/10/2018    BILATERAL BREAST MASTECTOMIES, RIGHT BREAST SENTINEL NODE BIOPSY (SN 4-9 3:00), BILATERAL PRE-PEC VS SUB-PEC TISSUE EXPANDERS AND ALLODERM WITH DERMAL MATRIX, PORTACATH INSERTION performed by Adrian Woods MD at [address] HX WISDOM TEETH EXTRACTION       No Known Allergies  Current Outpatient Medications   Medication Sig Dispense Refill    omega-3 fatty acids (FISH OIL CONCENTRATE PO) Take  by mouth daily.  coenzyme q10 (Co Q-10) 10 mg cap Take  by mouth.  propranolol LA (INDERAL LA) 160 mg capsule Take 1 Cap by mouth nightly. (Patient taking differently: Take 120 mg by mouth nightly.) 90 Cap 3    butalbital-acetaminophen-caffeine (FIORICET, ESGIC) -40 mg per tablet Take one to two by mouth on the onset of migraine. Do not use more than 10 days in a 30 day period 90 Tab 3    cholecalciferol, vitamin D3, (VITAMIN D3) 2,000 unit tab Take 5,000 Units by mouth daily.  b complex vitamins tablet Take 1 Tab by mouth daily.  C/sourcherry/celery/grape seed (TART CHERRY PO) Take 2 Caps by mouth daily.  OTHER Take 1 Cap by mouth daily. Indications: POMEGRANTE COMPLETE      mv-mn/C/glutamin/lysin/yska397 (AIRBORNE, ASCORBATE SODIUM, PO) Take 1 Gum by mouth daily.  tamoxifen (NOLVADEX) 20 mg tablet Take 20 mg by mouth nightly.  multivitamin (ONE A DAY) tablet Take 1 Tab by mouth daily.        Social History     Socioeconomic History    Marital status:      Spouse name: Not on file    Number of children: 1    Years of education: Not on file    Highest education level: Not on file   Occupational History    Occupation:    Tobacco Use    Smoking status: Never Smoker    Smokeless tobacco: Never Used   Substance and Sexual Activity    Alcohol use: No    Drug use: No     Family History   Problem Relation Age of Onset    Diabetes Mother     Asthma Mother     Hypertension Mother     Diabetes Father     Heart Disease Father     Glaucoma Father     No Known Problems Sister     Diabetes Brother     No Known Problems Sister     Hypertension Brother     Breast Cancer Other         66's    Anesth Problems Neg Hx        Health Maintenance   Topic Date Due    DTaP/Tdap/Td series (1 - Tdap) 03/05/1996    Flu Vaccine (1) 06/30/2021 (Originally 9/1/2020)    PAP AKA CERVICAL CYTOLOGY  04/24/2022    Lipid Screen  05/01/2024    Pneumococcal 0-64 years  Aged Out        Review of Systems  Constitutional: negative for fevers, chills, anorexia and weight loss  Eyes:   negative for visual disturbance and irritation  ENT:   negative for tinnitus,sore throat,nasal congestion,ear pain,hoarseness  Respiratory:  negative for cough, hemoptysis, dyspnea,wheezing  CV:   negative for chest pain, palpitations, lower extremity edema  GI:   negative for nausea, vomiting, diarrhea, abdominal pain,melena  Endo:               negative for polyuria,polydipsia,polyphagia,heat intolerance  Genitourinary: negative for frequency, dysuria and hematuria  Integumentary: negative for rash and pruritus  Hematologic:  negative for easy bruising and gum/nose bleeding  Musculoskel: negative for myalgias, arthralgias, back pain, muscle weakness, joint pain  Neurological:  negative for headaches, dizziness, vertigo, memory problems and gait   Behavl/Psych: negative for feelings of anxiety, depression, mood changes  ROS otherwise negative      Objective:  Visit Vitals  /76 (BP 1 Location: Left arm, BP Patient Position: Sitting)   Pulse 93   Temp 98.6 °F (37 °C) (Oral)   Resp 18   Ht 5' (1.524 m)   Wt 118 lb 12.8 oz (53.9 kg)   SpO2 98%   BMI 23.20 kg/m²     Body mass index is 23.2 kg/m².     Physical Exam:   General appearance - alert, well appearing, and in no distress  Mental status - alert, oriented to person, place, and time  EYE-BRONSON, EOMI,conjunctiva normal bilaterally, lids normal  ENT-ENT exam normal, no neck nodes or sinus tenderness  Nose - normal and patent, no erythema,  Or discharge   Mouth - mucous membranes moist, pharynx normal without lesions  Neck - supple, no significant adenopathy or bruit  Chest - clear to auscultation, no wheezes, rales or rhonchi. Heart - normal rate, regular rhythm, normal S1, S2, no murmurs, rubs, clicks or gallops   Abdomen - soft, nontender, nondistended, no masses or organomegaly  Lymph- no adenopathy palpable  Ext-peripheral pulses normal, no pedal edema, no clubbing or cyanosis  Skin-Warm and dry. no hyperpigmentation, vitiligo, or suspicious lesions  Neuro -alert, oriented, normal speech, no focal findings or movement disorder noted      Assessment/Plan:  Diagnoses and all orders for this visit:    Essential hypertension  -     COLLECTION VENOUS BLOOD,VENIPUNCTURE  -     LIPID PANEL  -     METABOLIC PANEL, COMPREHENSIVE  -     TSH 3RD GENERATION  -     URINALYSIS W/MICROSCOPIC    Malignant neoplasm of upper-outer quadrant of right female breast, unspecified estrogen receptor status (HCC)  -     VITAMIN D, 25 HYDROXY    S/P mastectomy, bilateral    Nonintractable headache, unspecified chronicity pattern, unspecified headache type        Other instructions: The patient's medications were reviewed and reconciled. No change in her current medical regimen will be made. A no added salt, prudent diet is encouraged    Continued follow-up with oncology regarding her breast cancer management    Patient had a CBC done yesterday at the oncology clinic. We will complete her laboratory check today with the labs as ordered. Follow-up in 6 months    Follow-up and Dispositions    · Return in about 6 months (around 6/30/2021). I have reviewed with the patient details of the assessment and plan and all questions were answered. Relevent patient education was performed. The most recent lab findings were reviewed with the patient. An After Visit Summary was printed and given to the patient.     Nessa Herrera MD    Please note that this dictation was completed with [employer], the [employer] voice recognition software. Quite often unanticipated grammatical, syntax, homophones, and other interpretive errors are inadvertently transcribed by the computer software. Please disregard these errors. Please excuse any errors that have escaped final proofreading.

## 2020-12-30 NOTE — PROGRESS NOTES
Candice Webb is a 39 y.o. female presenting for Follow Up Chronic Condition (6 mo fu)  . 1. Have you been to the ER, urgent care clinic since your last visit? Hospitalized since your last visit? No    2. Have you seen or consulted any other health care providers outside of the [address] [place] [address] since your last visit? Include any pap smears or colon screening. Dr Jose Enrique Da Silva    No flowsheet data found. Abuse Screening Questionnaire 9/22/2016   Do you ever feel afraid of your partner? N   Are you in a relationship with someone who physically or mentally threatens you? N   Is it safe for you to go home? Y       3 most recent PHQ Screens 6/30/2020   Little interest or pleasure in doing things Not at all   Feeling down, depressed, irritable, or hopeless Not at all   Total Score PHQ 2 0       There are no discontinued medications.

## 2021-01-01 LAB — BACTERIA UR CULT: NORMAL

## 2021-01-01 NOTE — PROGRESS NOTES
Labs stable except vitamin D high and needs to STOP supplement.   Recheck vitamin D level in 3 months

## 2021-03-30 ENCOUNTER — OFFICE VISIT (OUTPATIENT)
Dept: SURGERY | Age: 46
End: 2021-03-30
Payer: COMMERCIAL

## 2021-03-30 VITALS
TEMPERATURE: 98.8 F | HEIGHT: 60 IN | BODY MASS INDEX: 23.16 KG/M2 | DIASTOLIC BLOOD PRESSURE: 76 MMHG | HEART RATE: 85 BPM | WEIGHT: 118 LBS | SYSTOLIC BLOOD PRESSURE: 141 MMHG

## 2021-03-30 DIAGNOSIS — Z90.13 S/P MASTECTOMY, BILATERAL: ICD-10-CM

## 2021-03-30 DIAGNOSIS — Z85.3 HISTORY OF BREAST CANCER: ICD-10-CM

## 2021-03-30 DIAGNOSIS — C50.411 MALIGNANT NEOPLASM OF UPPER-OUTER QUADRANT OF RIGHT FEMALE BREAST, UNSPECIFIED ESTROGEN RECEPTOR STATUS (HCC): Primary | ICD-10-CM

## 2021-03-30 PROCEDURE — 99213 OFFICE O/P EST LOW 20 MIN: CPT | Performed by: NURSE PRACTITIONER

## 2021-03-30 NOTE — PATIENT INSTRUCTIONS

## 2021-03-30 NOTE — PROGRESS NOTES
HISTORY OF PRESENT ILLNESS  America Carrasquillo is a 55 y.o. female. HPI ESTABLISHED patient here for follow-up of RIGHT breast cancer, S/P BILATERAL mastectomies with reconstruction. No breast complaints or breast pain.         History of breast cancer-   Referring - Dr. Siddiqi Kidney  T1 N0 IDC Grade 2 Er/Pr+ Her 2 foreign equivocal right breast cancer.  HER2 positive by [facility]  4/2018- bilateral mastectomy, RIGHT SLNB with reconstruction and port insertion - Dr. Sakshi Ambriz with Dr. Gabe Cole. Completed chemotherapy. Completed Herceptin in 4/2019. Currently taking Tamoxifen. Followed by Dr. Sp Marcano     FH: Maternal cousin diagnosed with breast cancer in her 66's. OB History    No obstetric history on file. Obstetric Comments   Menarche:  15. LMP: 2/8/18. # of Children:  1. Age at Delivery of First Child:  25.   Hysterectomy/oophorectomy:  NO/NO. Breast Bx:  yes. Hx of Breast Feeding:  no. BCP:  yes.  Hormone therapy:  no.                    Past Surgical History:   Procedure Laterality Date    HX BREAST BIOPSY Right 02/2018    HX BREAST RECONSTRUCTION Bilateral 4/10/2018    BREAST RECONSTRUCTION performed by Lenin Vasquez MD at [address] BREAST RECONSTRUCTION Bilateral 6/18/2019    REMOVE BILATERAL BREAST TISSUE EXPANDERS, REVISION OF RECONSTRUCTED BREASTS,  PLACE BILATERAL BREAST IMPLANTS, FAT GRAFTING FROM ABDOMEN TO BILATERAL BREASTS  AND REMOVAL OF JOHAN CATH performed by Lenin Vasquez MD at [address] HX BREAST RECONSTRUCTION Bilateral 6/18/2019    BREAST RECONSTRUCTION performed by Lenin Vasquez MD at [address] HX MASTECTOMY Bilateral 4/10/2018    BILATERAL BREAST MASTECTOMIES, RIGHT BREAST SENTINEL NODE BIOPSY (SN 4-9 3:00), BILATERAL PRE-PEC VS SUB-PEC TISSUE EXPANDERS AND ALLODERM WITH DERMAL MATRIX, PORTACATH INSERTION performed by Jonathan Richmond MD at [address]  [facility]    Physical Exam  Constitutional:       Appearance: Normal appearance. Chest:          Comments: Chest wall s/p bilateral mastectomies with implant reconstruction - no masses or skin changes  Musculoskeletal: Normal range of motion. Comments: UE x 2    Lymphadenopathy:      Upper Body:      Right upper body: No supraclavicular or axillary adenopathy. Left upper body: No supraclavicular or axillary adenopathy. Neurological:      Mental Status: She is alert. Psychiatric:         Attention and Perception: Attention normal.         Mood and Affect: Mood normal.         Speech: Speech normal.         Behavior: Behavior normal.       Visit Vitals  BP (!) 141/76   Pulse 85   Temp 98.8 °F (37.1 °C)   Ht 5' (1.524 m)   Wt 118 lb (53.5 kg)   BMI 23.05 kg/m²         ASSESSMENT and PLAN  Encounter Diagnoses   Name Primary?  Malignant neoplasm of upper-outer quadrant of right female breast, unspecified estrogen receptor status ([facility] [address] [address] Yes    S/P mastectomy, bilateral     History of breast cancer         Normal exam with no evidence of local recurrence. Reviewed s/sx of local recurrence. Continues follow-up with Dr. Keely Varela. Reviewed follow-up care with our office and breast imaging on a PRN basis. RTC in 6 months or sooner PRN. She is comfortable with this plan.  All questions answered and she and her  stated understanding. Total time spent for this patient - 20 minutes.

## 2021-04-05 ENCOUNTER — LAB ONLY (OUTPATIENT)
Dept: INTERNAL MEDICINE CLINIC | Age: 46
End: 2021-04-05
Payer: COMMERCIAL

## 2021-04-05 DIAGNOSIS — R79.89 LOW VITAMIN D LEVEL: Primary | ICD-10-CM

## 2021-04-05 PROCEDURE — 82306 VITAMIN D 25 HYDROXY: CPT | Performed by: INTERNAL MEDICINE

## 2021-04-06 LAB — 25(OH)D3 SERPL-MCNC: 61 NG/ML (ref 30–96)

## 2021-04-10 ENCOUNTER — IMMUNIZATION (OUTPATIENT)
Dept: FAMILY MEDICINE CLINIC | Age: 46
End: 2021-04-10
Payer: COMMERCIAL

## 2021-04-10 DIAGNOSIS — Z23 ENCOUNTER FOR IMMUNIZATION: Primary | ICD-10-CM

## 2021-04-10 PROCEDURE — 91300 COVID-19, MRNA, LNP-S, PF, 30MCG/0.3ML DOSE(PFIZER): CPT | Performed by: FAMILY MEDICINE

## 2021-04-10 PROCEDURE — 0001A COVID-19, MRNA, LNP-S, PF, 30MCG/0.3ML DOSE(PFIZER): CPT | Performed by: FAMILY MEDICINE

## 2021-05-01 ENCOUNTER — IMMUNIZATION (OUTPATIENT)
Dept: FAMILY MEDICINE CLINIC | Age: 46
End: 2021-05-01
Payer: COMMERCIAL

## 2021-05-01 DIAGNOSIS — Z23 ENCOUNTER FOR IMMUNIZATION: Primary | ICD-10-CM

## 2021-05-01 PROCEDURE — 0002A COVID-19, MRNA, LNP-S, PF, 30MCG/0.3ML DOSE(PFIZER): CPT | Performed by: FAMILY MEDICINE

## 2021-05-01 PROCEDURE — 91300 COVID-19, MRNA, LNP-S, PF, 30MCG/0.3ML DOSE(PFIZER): CPT | Performed by: FAMILY MEDICINE

## 2021-06-30 ENCOUNTER — OFFICE VISIT (OUTPATIENT)
Dept: NEUROLOGY | Age: 46
End: 2021-06-30
Payer: COMMERCIAL

## 2021-06-30 VITALS
SYSTOLIC BLOOD PRESSURE: 132 MMHG | RESPIRATION RATE: 16 BRPM | HEART RATE: 83 BPM | WEIGHT: 118 LBS | DIASTOLIC BLOOD PRESSURE: 82 MMHG | BODY MASS INDEX: 23.16 KG/M2 | OXYGEN SATURATION: 99 % | HEIGHT: 60 IN

## 2021-06-30 DIAGNOSIS — G43.009 MIGRAINE WITHOUT AURA AND WITHOUT STATUS MIGRAINOSUS, NOT INTRACTABLE: ICD-10-CM

## 2021-06-30 PROCEDURE — 99214 OFFICE O/P EST MOD 30 MIN: CPT | Performed by: PSYCHIATRY & NEUROLOGY

## 2021-06-30 RX ORDER — BUTALBITAL, ACETAMINOPHEN AND CAFFEINE 50; 325; 40 MG/1; MG/1; MG/1
TABLET ORAL
Qty: 90 TABLET | Refills: 3 | Status: SHIPPED | OUTPATIENT
Start: 2021-06-30 | End: 2022-01-04

## 2021-06-30 RX ORDER — PROPRANOLOL HYDROCHLORIDE 160 MG/1
120 CAPSULE, EXTENDED RELEASE ORAL
Qty: 90 CAPSULE | Refills: 3 | Status: SHIPPED | OUTPATIENT
Start: 2021-06-30 | End: 2022-01-04 | Stop reason: SDUPTHER

## 2021-06-30 NOTE — PROGRESS NOTES
Chief Complaint   Patient presents with    Follow-up     follow up on the migraines still gets them but not as bad as she did before she started coming here.

## 2021-07-01 ENCOUNTER — OFFICE VISIT (OUTPATIENT)
Dept: INTERNAL MEDICINE CLINIC | Age: 46
End: 2021-07-01
Payer: COMMERCIAL

## 2021-07-01 VITALS
RESPIRATION RATE: 18 BRPM | HEART RATE: 78 BPM | TEMPERATURE: 98.1 F | HEIGHT: 60 IN | DIASTOLIC BLOOD PRESSURE: 72 MMHG | OXYGEN SATURATION: 98 % | WEIGHT: 120.4 LBS | BODY MASS INDEX: 23.64 KG/M2 | SYSTOLIC BLOOD PRESSURE: 122 MMHG

## 2021-07-01 DIAGNOSIS — I10 ESSENTIAL HYPERTENSION: Primary | Chronic | ICD-10-CM

## 2021-07-01 PROCEDURE — 99213 OFFICE O/P EST LOW 20 MIN: CPT | Performed by: INTERNAL MEDICINE

## 2021-07-01 NOTE — PROGRESS NOTES
Subjective:     Mrs. Vasquez Crawford returns to the office today in follow-up of her hypertension. The patient is currently on Inderal LA which she tolerates well. She denies fatigue, palpitations or orthostatic dizziness. She has been following a no added salt diet and exercising. She denies any chest pain, shortness of breath or neurological dysfunction. Past Medical History:   Diagnosis Date    Arrhythmia     PALPITATIONS    Cancer ([address] 2018    BREAST, Right    Gestational diabetes     Headache     Headache 4/8/2019    Hypertension     Ill-defined condition     MIGRAINES    Palpitations     Prediabetes      Past Surgical History:   Procedure Laterality Date    HX BREAST BIOPSY Right 02/2018    HX BREAST RECONSTRUCTION Bilateral 4/10/2018    BREAST RECONSTRUCTION performed by Richard Baez MD at [address]X BREAST RECONSTRUCTION Bilateral 6/18/2019    REMOVE BILATERAL BREAST TISSUE EXPANDERS, REVISION OF RECONSTRUCTED BREASTS,  PLACE BILATERAL BREAST IMPLANTS, FAT GRAFTING FROM ABDOMEN TO BILATERAL BREASTS  AND REMOVAL OF JOHAN CATH performed by Richard Baez MD at [address]X BREAST RECONSTRUCTION Bilateral 6/18/2019    BREAST RECONSTRUCTION performed by Richard Baez MD at [address] HX MASTECTOMY Bilateral 4/10/2018    BILATERAL BREAST MASTECTOMIES, RIGHT BREAST SENTINEL NODE BIOPSY (SN 4-9 3:00), BILATERAL PRE-PEC VS SUB-PEC TISSUE EXPANDERS AND ALLODERM WITH DERMAL MATRIX, PORTACATH INSERTION performed by Rebel Astudillo MD at [address]X WISDOM TEETH EXTRACTION       No Known Allergies  Current Outpatient Medications   Medication Sig Dispense Refill    propranolol LA (INDERAL LA) 160 mg capsule Take 1 Capsule by mouth nightly. 90 Capsule 3    butalbital-acetaminophen-caffeine (FIORICET, ESGIC) -40 mg per tablet Take one to two by mouth on the onset of migraine.  Do not use more than 10 days in a 30 day period 90 Tablet 3    omega-3 fatty acids (FISH OIL CONCENTRATE PO) Take  by mouth daily.  coenzyme q10 (Co Q-10) 10 mg cap Take  by mouth.  cholecalciferol, vitamin D3, (VITAMIN D3) 2,000 unit tab Take 5,000 Units by mouth daily.  b complex vitamins tablet Take 1 Tab by mouth daily.  C/sourcherry/celery/grape seed (TART CHERRY PO) Take 2 Caps by mouth daily.  OTHER Take 1 Cap by mouth daily. Indications: POMEGRANTE COMPLETE      mv-mn/C/glutamin/lysin/gszn488 (AIRBORNE, ASCORBATE SODIUM, PO) Take 1 Gum by mouth daily.  tamoxifen (NOLVADEX) 20 mg tablet Take 20 mg by mouth nightly.  multivitamin (ONE A DAY) tablet Take 1 Tab by mouth daily. Social History     Socioeconomic History    Marital status:      Spouse name: Not on file    Number of children: 1    Years of education: Not on file    Highest education level: Not on file   Occupational History    Occupation:    Tobacco Use    Smoking status: Never Smoker    Smokeless tobacco: Never Used   Vaping Use    Vaping Use: Never used   Substance and Sexual Activity    Alcohol use: No    Drug use: No     Social Determinants of Health     Financial Resource Strain:     Difficulty of Paying Living Expenses:    Food Insecurity:     Worried About Running Out of Food in the Last Year:     [address] in the Last Year:    Transportation Needs:     Lack of Transportation (Medical):      Lack of Transportation (Non-Medical):    Physical Activity:     Days of Exercise per Week:     Minutes of Exercise per Session:    Stress:     Feeling of Stress :    Social Connections:     Frequency of Communication with Friends and Family:     Frequency of Social Gatherings with Friends and Family:     Attends [employer] Services:     Active Member of Clubs or Organizations:     Attends Club or Organization Meetings:     Marital Status:      Family History   Problem Relation Age of Onset    Diabetes Mother     Asthma Mother     Hypertension Mother     Diabetes Father     Heart Disease Father     Glaucoma Father     No Known Problems Sister     Diabetes Brother     No Known Problems Sister     Hypertension Brother     Breast Cancer Other         66's    Anesth Problems Neg Hx        Review of Systems:  GEN: no weight loss, weight gain, fatigue or night sweats  CV: no PND, orthopnea, or palpitations  Resp: no dyspnea on exertion, no cough  Abd: no nausea, vomiting or diarrhea  EXT: denies edema, claudication  Endocrine: no hair loss, excessive thirst or polyuria  Neurological ROS: no TIA or stroke symptoms  ROS otherwise negative      Objective:     Visit Vitals  /72 (BP 1 Location: Left upper arm, BP Patient Position: Sitting, BP Cuff Size: Adult)   Pulse 78   Temp 98.1 °F (36.7 °C) (Oral)   Resp 18   Ht 5' (1.524 m)   Wt 120 lb 6.4 oz (54.6 kg)   LMP  (LMP Unknown) Comment: has a cancer and since her vaccine she hasnt had. SpO2 98%   BMI 23.51 kg/m²     Body mass index is 23.51 kg/m². General:   alert, cooperative and no distress   Eyes: conjunctivae/sclerae clear. PERRL, EOM's intact   Mouth:  No oral lesions, no pharyngeal erythema, no exudates   Neck: Trachea midline, no thyromegaly, no bruits   Heart: S1 and S2 normal,no murmurs noted    Lungs: Clear to auscultation bilaterally, no increased work of breathing   Abdomen: Soft, nontender. Normal bowel sounds   Extremities: No edema or cyanosis   Neuro: ..alert, oriented x3,speech normal in context and clarity, cranial nerves II-XII intact,motor strength: full proximally and distally,gait: normal  reflexes: full and symmetric     Physical exam otherwise negative         Assessment/Plan:     Diagnoses and all orders for this visit:    Essential hypertension        Other instructions: The patient's medications were reviewed and reconciled. No change in her current medical regimen will be made.     A no added salt diet and exercise is encouraged    BMI is normal    Labs from 12/30 and 4/5 were reviewed with the patient today    Follow-up in 6 months    Follow-up and Dispositions    · Return in about 6 months (around 1/1/2022). Kiera Qiu MD    Please note that this dictation was completed with [employer], the computer voice recognition software. Quite often unanticipated grammatical, syntax, homophones, and other interpretive errors are inadvertently transcribed by the computer software. Please disregard these errors. Please excuse any errors that have escaped final proofreading.

## 2021-07-01 NOTE — PROGRESS NOTES
Iftikhar Quintanilla is a 55 y.o. female presenting for Follow Up Chronic Condition (6 mo fu)  . 1. Have you been to the ER, urgent care clinic since your last visit? Hospitalized since your last visit? No    2. Have you seen or consulted any other health care providers outside of the [address] [place] [address] since your last visit? Include any pap smears or colon screening. Oncologist    No flowsheet data found. Abuse Screening Questionnaire 9/22/2016   Do you ever feel afraid of your partner? N   Are you in a relationship with someone who physically or mentally threatens you? N   Is it safe for you to go home? Y       3 most recent PHQ Screens 6/30/2021   Little interest or pleasure in doing things Not at all   Feeling down, depressed, irritable, or hopeless Not at all   Total Score PHQ 2 0       There are no discontinued medications.

## 2021-07-01 NOTE — PATIENT INSTRUCTIONS
DASH Diet: Care Instructions  Your Care Instructions     The DASH diet is an eating plan that can help lower your blood pressure. DASH stands for Dietary Approaches to Stop Hypertension. Hypertension is high blood pressure. The DASH diet focuses on eating foods that are high in calcium, potassium, and magnesium. These nutrients can lower blood pressure. The foods that are highest in these nutrients are fruits, vegetables, low-fat dairy products, nuts, seeds, and legumes. But taking calcium, potassium, and magnesium supplements instead of eating foods that are high in those nutrients does not have the same effect. The DASH diet also includes whole grains, fish, and poultry. The DASH diet is one of several lifestyle changes your doctor may recommend to lower your high blood pressure. Your doctor may also want you to decrease the amount of sodium in your diet. Lowering sodium while following the DASH diet can lower blood pressure even further than just the DASH diet alone. Follow-up care is a key part of your treatment and safety. Be sure to make and go to all appointments, and call your doctor if you are having problems. It's also a good idea to know your test results and keep a list of the medicines you take. How can you care for yourself at home? Following the DASH diet  · Eat 4 to 5 servings of fruit each day. A serving is 1 medium-sized piece of fruit, ½ cup chopped or canned fruit, 1/4 cup dried fruit, or 4 ounces (½ cup) of fruit juice. Choose fruit more often than fruit juice. · Eat 4 to 5 servings of vegetables each day. A serving is 1 cup of lettuce or raw leafy vegetables, ½ cup of chopped or cooked vegetables, or 4 ounces (½ cup) of vegetable juice. Choose vegetables more often than vegetable juice. · Get 2 to 3 servings of low-fat and fat-free dairy each day. A serving is 8 ounces of milk, 1 cup of yogurt, or 1 ½ ounces of cheese. · Eat 6 to 8 servings of grains each day.  A serving is 1 slice of bread, 1 ounce of dry cereal, or ½ cup of cooked rice, pasta, or cooked cereal. Try to choose whole-grain products as much as possible. · Limit lean meat, poultry, and fish to 2 servings each day. A serving is 3 ounces, about the size of a deck of cards. · Eat 4 to 5 servings of nuts, seeds, and legumes (cooked dried beans, lentils, and split peas) each week. A serving is 1/3 cup of nuts, 2 tablespoons of seeds, or ½ cup of cooked beans or peas. · Limit fats and oils to 2 to 3 servings each day. A serving is 1 teaspoon of vegetable oil or 2 tablespoons of salad dressing. · Limit sweets and added sugars to 5 servings or less a week. A serving is 1 tablespoon jelly or jam, ½ cup sorbet, or 1 cup of lemonade. · Eat less than 2,300 milligrams (mg) of sodium a day. If you limit your sodium to 1,500 mg a day, you can lower your blood pressure even more. · Be aware that all of these are the suggested number of servings for people who eat 1,800 to 2,000 calories a day. Your recommended number of servings may be different if you need more or fewer calories. Tips for success  · Start small. Do not try to make dramatic changes to your diet all at once. You might feel that you are missing out on your favorite foods and then be more likely to not follow the plan. Make small changes, and stick with them. Once those changes become habit, add a few more changes. · Try some of the following:  ? Make it a goal to eat a fruit or vegetable at every meal and at snacks. This will make it easy to get the recommended amount of fruits and vegetables each day. ? Try yogurt topped with fruit and nuts for a snack or healthy dessert. ? Add lettuce, tomato, cucumber, and onion to sandwiches. ? Combine a ready-made pizza crust with low-fat mozzarella cheese and lots of vegetable toppings. Try using tomatoes, squash, spinach, broccoli, carrots, cauliflower, and onions. ?  Have a variety of cut-up vegetables with a low-fat dip as an appetizer instead of chips and dip. ? Sprinkle sunflower seeds or chopped almonds over salads. Or try adding chopped walnuts or almonds to cooked vegetables. ? Try some vegetarian meals using beans and peas. Add garbanzo or kidney beans to salads. Make burritos and tacos with mashed sandoval beans or black beans. Where can you learn more? Go to http://www.cifuentes.com/  Enter H967 in the search box to learn more about \"DASH Diet: Care Instructions. \"  Current as of: August 31, 2020               Content Version: 12.8  © 7621-6942 [employer]. Care instructions adapted under license by [employer] (which disclaims liability or warranty for this information). If you have questions about a medical condition or this instruction, always ask your healthcare professional. [address] any warranty or liability for your use of this information.

## 2021-07-06 NOTE — PROGRESS NOTES
Follow-up Visit    Name Sita Soni Age 55 y.o. MRN [id number]  1975       Chief Complaint: Migraine headaches  Ms. Onur Hollis  returns for a follow up visit. Since last being seen she  has been compliant with medications. No new health conditions have arisen. No new medications have been  prescribed. She has no new symptoms to address today. She is content with current treatment. She would like to have new refills. Assesment and Plan  1. Migraine without aura and without status migrainosus, not intractable    - propranolol LA (INDERAL LA) 160 mg capsule; Take 1 Capsule by mouth nightly. Dispense: 90 Capsule; Refill: 3  - butalbital-acetaminophen-caffeine (FIORICET, ESGIC) -40 mg per tablet; Take one to two by mouth on the onset of migraine. Do not use more than 10 days in a 30 day period  Dispense: 90 Tablet; Refill: 3      Allergies  Patient has no known allergies. Medications  Current Outpatient Medications   Medication Sig    propranolol LA (INDERAL LA) 160 mg capsule Take 1 Capsule by mouth nightly.  butalbital-acetaminophen-caffeine (FIORICET, ESGIC) -40 mg per tablet Take one to two by mouth on the onset of migraine. Do not use more than 10 days in a 30 day period    omega-3 fatty acids (FISH OIL CONCENTRATE PO) Take  by mouth daily.  coenzyme q10 (Co Q-10) 10 mg cap Take  by mouth.  cholecalciferol, vitamin D3, (VITAMIN D3) 2,000 unit tab Take 5,000 Units by mouth daily.  b complex vitamins tablet Take 1 Tab by mouth daily.  C/sourcherry/celery/grape seed (TART CHERRY PO) Take 2 Caps by mouth daily.  OTHER Take 1 Cap by mouth daily. Indications: POMEGRANTE COMPLETE    mv-mn/C/glutamin/lysin/dmwq035 (AIRBORNE, ASCORBATE SODIUM, PO) Take 1 Gum by mouth daily.  tamoxifen (NOLVADEX) 20 mg tablet Take 20 mg by mouth nightly.  multivitamin (ONE A DAY) tablet Take 1 Tab by mouth daily. No current facility-administered medications for this visit. Medical History  Past Medical History:   Diagnosis Date    Arrhythmia     PALPITATIONS    Cancer ([facility] [address] 2018    BREAST, Right    Gestational diabetes     Headache     Headache 4/8/2019    Hypertension     Ill-defined condition     MIGRAINES    Palpitations     Prediabetes        Review of Systems   Eyes: Negative for blurred vision and double vision. Respiratory: Negative for cough and shortness of breath. Cardiovascular: Negative for chest pain, palpitations and orthopnea. Gastrointestinal: Negative for nausea and vomiting. Neurological: Positive for headaches. Negative for dizziness. Psychiatric/Behavioral: Negative for depression. The patient is not nervous/anxious. Exam:  Visit Vitals  /82 (BP 1 Location: Left upper arm, BP Patient Position: Sitting, BP Cuff Size: Adult)   Pulse 83   Resp 16   Ht 5' (1.524 m)   Wt 118 lb (53.5 kg)   LMP  (LMP Unknown) Comment: has a cancer and since her vaccine she hasnt had. SpO2 99%   BMI 23.05 kg/m²        General: Well developed, well nourished. Patient in no apparent distress   Head: Normocephalic, atraumatic, anicteric sclera   Neck Normal ROM, No thyromegally   Cardiac: Regular rate and rhythm   Ext: No pedal edema   Skin: Supple no rash     NeurologicExam:  Mental Status: Alert and oriented to person place and time   Speech: Fluent no aphasia or dysarthria. Cranial Nerves:  II - XII Intact   Motor:  Full and symmetric strength . Normal bulk and tone. Sensory:   Symmetric and intact    Gait:  Gait is balanced and fluid with normal arm swing. Tremor:   No tremor noted. Cerebellar:  Coordination intact.               Lab Review  Lab Results   Component Value Date/Time    WBC 7.1 05/01/2019 02:31 PM    HCT 40.8 05/01/2019 02:31 PM    HGB 13.9 05/01/2019 02:31 PM    PLATELET 716.4 17/76/7896 02:31 PM       Lab Results   Component Value Date/Time    Sodium 140 12/30/2020 03:37 PM    Potassium 4.3 12/30/2020 03:37 PM    Chloride 103 12/30/2020 03:37 PM    CO2 25.0 12/30/2020 03:37 PM    Glucose 120 (H) 12/30/2020 03:37 PM    BUN 20.0 (H) 12/30/2020 03:37 PM    Creatinine 1.0 12/30/2020 03:37 PM    Calcium 9.9 12/30/2020 03:37 PM         Lab Results   Component Value Date/Time    Cholesterol, total 188 12/30/2020 03:37 PM    HDL Cholesterol 52 12/30/2020 03:37 PM    LDL, calculated 118 12/30/2020 03:37 PM    VLDL 18 12/30/2020 03:37 PM    Triglyceride 89 12/30/2020 03:37 PM    CHOL/HDL Ratio 4 12/30/2020 03:37 PM

## 2021-09-28 ENCOUNTER — OFFICE VISIT (OUTPATIENT)
Dept: SURGERY | Age: 46
End: 2021-09-28
Payer: COMMERCIAL

## 2021-09-28 DIAGNOSIS — Z90.13 S/P MASTECTOMY, BILATERAL: ICD-10-CM

## 2021-09-28 DIAGNOSIS — N63.0 BREAST MASS IN FEMALE: ICD-10-CM

## 2021-09-28 DIAGNOSIS — C50.411 MALIGNANT NEOPLASM OF UPPER-OUTER QUADRANT OF RIGHT FEMALE BREAST, UNSPECIFIED ESTROGEN RECEPTOR STATUS (HCC): Primary | ICD-10-CM

## 2021-09-28 DIAGNOSIS — Z85.3 HISTORY OF BREAST CANCER: ICD-10-CM

## 2021-09-28 PROCEDURE — 99213 OFFICE O/P EST LOW 20 MIN: CPT | Performed by: NURSE PRACTITIONER

## 2021-09-28 NOTE — PROGRESS NOTES
HISTORY OF PRESENT ILLNESS  America Israel is a 55 y.o. female. HPI ESTABLISHED patient here for follow-up of RIGHT breast cancer, S/P BILATERAL mastectomies with reconstruction.  No breast complaints or breast pain.         History of breast cancer-   Referring - Dr. Abeba Kerr  T1 N0 IDC Grade 2 Er/Pr+ Her 2 foreign equivocal right breast cancer.  HER2 positive by [facility]  4/2018- bilateral mastectomy, RIGHT SLNB with reconstruction and port insertion - Dr. Darlene Serrano with Dr. Barbie Pelaez. Completed chemotherapy. Completed Herceptin in 4/2019. Currently taking Tamoxifen. Followed by Dr. Amy Samaniego     FH: Maternal cousin diagnosed with breast cancer in her 66's. OB History    No obstetric history on file. Obstetric Comments   Menarche:  15. LMP: 2/8/18. # of Children:  1. Age at Delivery of First Child:  25.   Hysterectomy/oophorectomy:  NO/NO. Breast Bx:  yes. Hx of Breast Feeding:  no. BCP:  yes.  Hormone therapy:  no.                    Past Surgical History:   Procedure Laterality Date    HX BREAST BIOPSY Right 02/2018    HX BREAST RECONSTRUCTION Bilateral 4/10/2018    BREAST RECONSTRUCTION performed by Alex Montemayor MD at [address] HX BREAST RECONSTRUCTION Bilateral 6/18/2019    REMOVE BILATERAL BREAST TISSUE EXPANDERS, REVISION OF RECONSTRUCTED BREASTS,  PLACE BILATERAL BREAST IMPLANTS, FAT GRAFTING FROM ABDOMEN TO BILATERAL BREASTS  AND REMOVAL OF JOHAN CATH performed by Alex Monetmayor MD at [address] HX BREAST RECONSTRUCTION Bilateral 6/18/2019    BREAST RECONSTRUCTION performed by Alex Montemayor MD at [address] HX MASTECTOMY Bilateral 4/10/2018    BILATERAL BREAST MASTECTOMIES, RIGHT BREAST SENTINEL NODE BIOPSY (SN 4-9 3:00), BILATERAL PRE-PEC VS SUB-PEC TISSUE EXPANDERS AND ALLODERM WITH DERMAL MATRIX, PORTACATH INSERTION performed by Warden Eisenmenger, MD at [address]  [facility]    Physical Exam  Constitutional:       Appearance: Normal appearance. Chest:      Breasts:         Right: Absent. Left: Absent. Comments: Chest wall s/p bilateral mastectomy with implant reconstruction  Musculoskeletal:      Comments: FROM - UE x 2   Lymphadenopathy:      Upper Body:      Right upper body: No supraclavicular or axillary adenopathy. Left upper body: No supraclavicular or axillary adenopathy. Neurological:      Mental Status: She is alert. Psychiatric:         Attention and Perception: Attention normal.         Mood and Affect: Mood normal.         Speech: Speech normal.         Behavior: Behavior normal.         ASSESSMENT and PLAN  Encounter Diagnoses   Name Primary?  Malignant neoplasm of upper-outer quadrant of right female breast, unspecified estrogen receptor status ([facility] [address] Yes    S/P mastectomy, bilateral     History of breast cancer     Breast mass in female        LEFT reconstructed breast - normal exam.  RIGHT reconstructed breast - puffy nodular area at center of breast - will have breast MRI for complete evaluation. Continues care with Dr. Jesica Kang. RTC in 6 months for routine follow-up. Anticipate yearly visits at that time. She is comfortable with this plan. All questions answered and she stated understanding. Total time spent for this patient - 20 minutes.

## 2021-10-26 ENCOUNTER — HOSPITAL ENCOUNTER (OUTPATIENT)
Dept: MRI IMAGING | Age: 46
Discharge: HOME | End: 2021-10-26
Attending: NURSE PRACTITIONER
Payer: COMMERCIAL

## 2021-10-26 DIAGNOSIS — N63.0 BREAST MASS IN FEMALE: ICD-10-CM

## 2021-10-26 DIAGNOSIS — Z90.13 S/P MASTECTOMY, BILATERAL: ICD-10-CM

## 2021-10-26 PROCEDURE — 74011250636 HC RX REV CODE- 250/636: Performed by: NURSE PRACTITIONER

## 2021-10-26 PROCEDURE — 77049 MRI BREAST C-+ W/CAD BI: CPT

## 2021-10-26 PROCEDURE — A9575 INJ GADOTERATE MEGLUMI 0.1ML: HCPCS | Performed by: NURSE PRACTITIONER

## 2021-10-26 PROCEDURE — 74011000258 HC RX REV CODE- 258: Performed by: NURSE PRACTITIONER

## 2021-10-26 RX ORDER — GADOTERATE MEGLUMINE 376.9 MG/ML
10 INJECTION INTRAVENOUS
Status: COMPLETED | OUTPATIENT
Start: 2021-10-26 | End: 2021-10-26

## 2021-10-26 RX ORDER — SODIUM CHLORIDE 0.9 % (FLUSH) 0.9 %
10 SYRINGE (ML) INJECTION
Status: COMPLETED | OUTPATIENT
Start: 2021-10-26 | End: 2021-10-26

## 2021-10-26 RX ADMIN — SODIUM CHLORIDE 100 ML: 900 INJECTION, SOLUTION INTRAVENOUS at 15:03

## 2021-10-26 RX ADMIN — GADOTERATE MEGLUMINE 10 ML: 376.9 INJECTION INTRAVENOUS at 15:02

## 2021-10-26 RX ADMIN — Medication 10 ML: at 15:03

## 2021-10-28 ENCOUNTER — PATIENT MESSAGE (OUTPATIENT)
Dept: SURGERY | Age: 46
End: 2021-10-28

## 2021-12-29 ENCOUNTER — OFFICE VISIT (OUTPATIENT)
Dept: INTERNAL MEDICINE CLINIC | Age: 46
End: 2021-12-29
Payer: COMMERCIAL

## 2021-12-29 VITALS
OXYGEN SATURATION: 99 % | BODY MASS INDEX: 24.35 KG/M2 | TEMPERATURE: 99.2 F | RESPIRATION RATE: 18 BRPM | WEIGHT: 124 LBS | DIASTOLIC BLOOD PRESSURE: 70 MMHG | HEIGHT: 60 IN | HEART RATE: 80 BPM | SYSTOLIC BLOOD PRESSURE: 110 MMHG

## 2021-12-29 DIAGNOSIS — E55.9 VITAMIN D DEFICIENCY: ICD-10-CM

## 2021-12-29 DIAGNOSIS — I10 PRIMARY HYPERTENSION: Primary | Chronic | ICD-10-CM

## 2021-12-29 DIAGNOSIS — Z11.59 NEED FOR HEPATITIS C SCREENING TEST: ICD-10-CM

## 2021-12-29 PROCEDURE — 99213 OFFICE O/P EST LOW 20 MIN: CPT | Performed by: INTERNAL MEDICINE

## 2021-12-29 NOTE — PROGRESS NOTES
Subjective:     Mrs. Angus Odell returns to the office today in general medical follow-up. The patient has hypertension managed on Inderal LA. She is tolerating this without fatigue, palpitations or dizzy spells. She has had no headaches, numbness, tingling or focal neurological problems. The patient has had a history of vitamin D deficiency and was on supplementation but this had to be stopped due to extremely high vitamin D levels. Follow-up vitamin D was within the normal range. She is due to have this rechecked today. Past Medical History:   Diagnosis Date    Arrhythmia     PALPITATIONS    Cancer ([address] 2018    BREAST, Right    Gestational diabetes     Headache     Headache 4/8/2019    Hypertension     Ill-defined condition     MIGRAINES    Palpitations     Prediabetes      Past Surgical History:   Procedure Laterality Date    HX BREAST BIOPSY Right 02/2018    HX BREAST RECONSTRUCTION Bilateral 4/10/2018    BREAST RECONSTRUCTION performed by Terrance Sweeney MD at [place] [address] HX BREAST RECONSTRUCTION Bilateral 6/18/2019    REMOVE BILATERAL BREAST TISSUE EXPANDERS, REVISION OF RECONSTRUCTED BREASTS,  PLACE BILATERAL BREAST IMPLANTS, FAT GRAFTING FROM ABDOMEN TO BILATERAL BREASTS  AND REMOVAL OF JOHAN CATH performed by Terrance Sweeney MD at [place] [address] HX BREAST RECONSTRUCTION Bilateral 6/18/2019    BREAST RECONSTRUCTION performed by Terrance Sweeney MD at [place] [address] HX MASTECTOMY Bilateral 4/10/2018    BILATERAL BREAST MASTECTOMIES, RIGHT BREAST SENTINEL NODE BIOPSY (SN 4-9 3:00), BILATERAL PRE-PEC VS SUB-PEC TISSUE EXPANDERS AND ALLODERM WITH DERMAL MATRIX, PORTACATH INSERTION performed by Sunni Lozada MD at [place] [address] HX WISDOM TEETH EXTRACTION       No Known Allergies  Current Outpatient Medications   Medication Sig Dispense Refill    propranolol LA (INDERAL LA) 160 mg capsule Take 1 Capsule by mouth nightly.  90 Capsule 3    butalbital-acetaminophen-caffeine (FIORICET, ESGIC) -40 mg per tablet Take one to two by mouth on the onset of migraine. Do not use more than 10 days in a 30 day period 90 Tablet 3    omega-3 fatty acids (FISH OIL CONCENTRATE PO) Take  by mouth daily.  coenzyme q10 (Co Q-10) 10 mg cap Take  by mouth.  cholecalciferol, vitamin D3, (VITAMIN D3) 2,000 unit tab Take 5,000 Units by mouth daily.  b complex vitamins tablet Take 1 Tab by mouth daily.  C/sourcherry/celery/grape seed (TART CHERRY PO) Take 2 Caps by mouth daily.  OTHER Take 1 Cap by mouth daily. Indications: POMEGRANTE COMPLETE      mv-mn/C/glutamin/lysin/fuji146 (AIRBORNE, ASCORBATE SODIUM, PO) Take 1 Gum by mouth daily.  tamoxifen (NOLVADEX) 20 mg tablet Take 20 mg by mouth nightly.  multivitamin (ONE A DAY) tablet Take 1 Tab by mouth daily.        Social History     Socioeconomic History    Marital status:     Number of children: 1   Occupational History    Occupation:    Tobacco Use    Smoking status: Never Smoker    Smokeless tobacco: Never Used   Vaping Use    Vaping Use: Never used   Substance and Sexual Activity    Alcohol use: No    Drug use: No     Family History   Problem Relation Age of Onset    Diabetes Mother     Asthma Mother     Hypertension Mother     Diabetes Father     Heart Disease Father     Glaucoma Father     No Known Problems Sister     Diabetes Brother     No Known Problems Sister     Hypertension Brother     Breast Cancer Other         66's    Anesth Problems Neg Hx        Review of Systems:  GEN: no weight loss, weight gain, fatigue or night sweats  CV: no PND, orthopnea, or palpitations  Resp: no dyspnea on exertion, no cough  Abd: no nausea, vomiting or diarrhea  EXT: denies edema, claudication  Endocrine: no hair loss, excessive thirst or polyuria  Neurological ROS: no TIA or stroke symptoms  ROS otherwise negative      Objective:     Visit Vitals  /70   Pulse 80   Temp 99.2 °F (37.3 °C) (Oral)   Resp 18   Ht 5' (1.524 m)   Wt 124 lb (56.2 kg)   SpO2 99%   BMI 24.22 kg/m²     Body mass index is 24.22 kg/m². General:   alert, cooperative and no distress   Eyes: conjunctivae/sclerae clear. PERRL, EOM's intact   Mouth:  No oral lesions, no pharyngeal erythema, no exudates   Neck: Trachea midline, no thyromegaly, no bruits   Heart: S1 and S2 normal,no murmurs noted    Lungs: Clear to auscultation bilaterally, no increased work of breathing   Abdomen: Soft, nontender. Normal bowel sounds   Extremities: No edema or cyanosis   Neuro: ..alert, oriented x3,speech normal in context and clarity, cranial nerves II-XII intact,motor strength: full proximally and distally,gait: normal  reflexes: full and symmetric     Physical exam otherwise negative         Assessment/Plan:     Diagnoses and all orders for this visit:    Primary hypertension  -     COLLECTION VENOUS BLOOD,VENIPUNCTURE  -     CBC WITH AUTOMATED DIFF; Future  -     LIPID PANEL; Future  -     METABOLIC PANEL, COMPREHENSIVE; Future  -     TSH 3RD GENERATION; Future  -     URINALYSIS W/ REFLEX CULTURE; Future    Vitamin D deficiency  -     VITAMIN D, 25 HYDROXY; Future    Need for hepatitis C screening test  -     HEPATITIS C AB; Future        Other instructions: The patient's medications were reviewed and reconciled. No change in her current medical regimen will be made. A no added salt, prudent diet is encouraged    Patient has received first 2 vaccinations of Covid19 vaccine and I have recommended a booster    Await results of multiple labs    Follow-up in 6 months    Follow-up and Dispositions    · Return in about 6 months (around 6/29/2022). Brooklyn Palma MD    Please note that this dictation was completed with [employer], the [employer] voice recognition software.   Quite often unanticipated grammatical, syntax, homophones, and other interpretive errors are inadvertently transcribed by the computer software. Please disregard these errors. Please excuse any errors that have escaped final proofreading.

## 2021-12-29 NOTE — PATIENT INSTRUCTIONS
DASH Diet: Care Instructions  Your Care Instructions     The DASH diet is an eating plan that can help lower your blood pressure. DASH stands for Dietary Approaches to Stop Hypertension. Hypertension is high blood pressure. The DASH diet focuses on eating foods that are high in calcium, potassium, and magnesium. These nutrients can lower blood pressure. The foods that are highest in these nutrients are fruits, vegetables, low-fat dairy products, nuts, seeds, and legumes. But taking calcium, potassium, and magnesium supplements instead of eating foods that are high in those nutrients does not have the same effect. The DASH diet also includes whole grains, fish, and poultry. The DASH diet is one of several lifestyle changes your doctor may recommend to lower your high blood pressure. Your doctor may also want you to decrease the amount of sodium in your diet. Lowering sodium while following the DASH diet can lower blood pressure even further than just the DASH diet alone. Follow-up care is a key part of your treatment and safety. Be sure to make and go to all appointments, and call your doctor if you are having problems. It's also a good idea to know your test results and keep a list of the medicines you take. How can you care for yourself at home? Following the DASH diet  · Eat 4 to 5 servings of fruit each day. A serving is 1 medium-sized piece of fruit, ½ cup chopped or canned fruit, 1/4 cup dried fruit, or 4 ounces (½ cup) of fruit juice. Choose fruit more often than fruit juice. · Eat 4 to 5 servings of vegetables each day. A serving is 1 cup of lettuce or raw leafy vegetables, ½ cup of chopped or cooked vegetables, or 4 ounces (½ cup) of vegetable juice. Choose vegetables more often than vegetable juice. · Get 2 to 3 servings of low-fat and fat-free dairy each day. A serving is 8 ounces of milk, 1 cup of yogurt, or 1 ½ ounces of cheese. · Eat 6 to 8 servings of grains each day.  A serving is 1 slice of bread, 1 ounce of dry cereal, or ½ cup of cooked rice, pasta, or cooked cereal. Try to choose whole-grain products as much as possible. · Limit lean meat, poultry, and fish to 2 servings each day. A serving is 3 ounces, about the size of a deck of cards. · Eat 4 to 5 servings of nuts, seeds, and legumes (cooked dried beans, lentils, and split peas) each week. A serving is 1/3 cup of nuts, 2 tablespoons of seeds, or ½ cup of cooked beans or peas. · Limit fats and oils to 2 to 3 servings each day. A serving is 1 teaspoon of vegetable oil or 2 tablespoons of salad dressing. · Limit sweets and added sugars to 5 servings or less a week. A serving is 1 tablespoon jelly or jam, ½ cup sorbet, or 1 cup of lemonade. · Eat less than 2,300 milligrams (mg) of sodium a day. If you limit your sodium to 1,500 mg a day, you can lower your blood pressure even more. · Be aware that all of these are the suggested number of servings for people who eat 1,800 to 2,000 calories a day. Your recommended number of servings may be different if you need more or fewer calories. Tips for success  · Start small. Do not try to make dramatic changes to your diet all at once. You might feel that you are missing out on your favorite foods and then be more likely to not follow the plan. Make small changes, and stick with them. Once those changes become habit, add a few more changes. · Try some of the following:  ? Make it a goal to eat a fruit or vegetable at every meal and at snacks. This will make it easy to get the recommended amount of fruits and vegetables each day. ? Try yogurt topped with fruit and nuts for a snack or healthy dessert. ? Add lettuce, tomato, cucumber, and onion to sandwiches. ? Combine a ready-made pizza crust with low-fat mozzarella cheese and lots of vegetable toppings. Try using tomatoes, squash, spinach, broccoli, carrots, cauliflower, and onions. ?  Have a variety of cut-up vegetables with a low-fat dip as an appetizer instead of chips and dip. ? Sprinkle sunflower seeds or chopped almonds over salads. Or try adding chopped walnuts or almonds to cooked vegetables. ? Try some vegetarian meals using beans and peas. Add garbanzo or kidney beans to salads. Make burritos and tacos with mashed sandoval beans or black beans. Where can you learn more? Go to http://www.cifuentes.com/  Enter H967 in the search box to learn more about \"DASH Diet: Care Instructions. \"  Current as of: April 29, 2021               Content Version: 13.0  © 0820-3870 [employer]. Care instructions adapted under license by [employer] (which disclaims liability or warranty for this information). If you have questions about a medical condition or this instruction, always ask your healthcare professional. [address] any warranty or liability for your use of this information.

## 2021-12-29 NOTE — PROGRESS NOTES
HIPAA verified by two patient identifiers. Alisson Huynh is a 55 y.o. female    Chief Complaint   Patient presents with    Hypertension     6 months       Visit Vitals  /83 (BP 1 Location: Left upper arm, BP Patient Position: Sitting, BP Cuff Size: Adult)   Pulse 80   Temp 99.2 °F (37.3 °C) (Oral)   Resp 18   Ht 5' (1.524 m)   Wt 124 lb (56.2 kg)   SpO2 99%   BMI 24.22 kg/m²       Pain Scale: 0 - No pain/10  Pain Location:       Health Maintenance Due   Topic Date Due    Hepatitis C Screening  Never done    DTaP/Tdap/Td series (1 - Tdap) Never done    Colorectal Cancer Screening Combo  Never done    Flu Vaccine (1) Never done    COVID-19 Vaccine (3 - Booster for Pfizer series) 11/01/2021         Coordination of Care Questionnaire:  :   1) Have you been to an emergency room, urgent care, or hospitalized since your last visit? If yes, where when, and reason for visit? no       2. Have seen or consulted any other health care provider since your last visit? If yes, where when, and reason for visit? NO      Patient is accompanied by  I have received verbal consent from Alisson Huynh to discuss any/all medical information while they are present in the room.

## 2021-12-30 LAB
25(OH)D3 SERPL-MCNC: 33.7 NG/ML (ref 30–100)
ALBUMIN SERPL-MCNC: 4 G/DL (ref 3.5–5)
ALBUMIN/GLOB SERPL: 1.2 {RATIO} (ref 1.1–2.2)
ALP SERPL-CCNC: 48 U/L (ref 45–117)
ALT SERPL-CCNC: 13 U/L (ref 12–78)
ANION GAP SERPL CALC-SCNC: 5 MMOL/L (ref 5–15)
APPEARANCE UR: CLEAR
AST SERPL-CCNC: 18 U/L (ref 15–37)
BACTERIA URNS QL MICRO: ABNORMAL /HPF
BASOPHILS # BLD: 0 K/UL (ref 0–0.1)
BASOPHILS NFR BLD: 0 % (ref 0–1)
BILIRUB SERPL-MCNC: 0.5 MG/DL (ref 0.2–1)
BILIRUB UR QL: NEGATIVE
BUN SERPL-MCNC: 14 MG/DL (ref 6–20)
BUN/CREAT SERPL: 13 (ref 12–20)
CALCIUM SERPL-MCNC: 9.8 MG/DL (ref 8.5–10.1)
CHLORIDE SERPL-SCNC: 111 MMOL/L (ref 97–108)
CHOLEST SERPL-MCNC: 156 MG/DL
CO2 SERPL-SCNC: 25 MMOL/L (ref 21–32)
COLOR UR: ABNORMAL
CREAT SERPL-MCNC: 1.07 MG/DL (ref 0.55–1.02)
DIFFERENTIAL METHOD BLD: ABNORMAL
EOSINOPHIL # BLD: 0.1 K/UL (ref 0–0.4)
EOSINOPHIL NFR BLD: 1 % (ref 0–7)
EPITH CASTS URNS QL MICRO: ABNORMAL /LPF
ERYTHROCYTE [DISTWIDTH] IN BLOOD BY AUTOMATED COUNT: 12.2 % (ref 11.5–14.5)
GLOBULIN SER CALC-MCNC: 3.4 G/DL (ref 2–4)
GLUCOSE SERPL-MCNC: 103 MG/DL (ref 65–100)
GLUCOSE UR STRIP.AUTO-MCNC: NEGATIVE MG/DL
HCT VFR BLD AUTO: 42.1 % (ref 35–47)
HCV AB SERPL QL IA: NONREACTIVE
HDLC SERPL-MCNC: 48 MG/DL
HDLC SERPL: 3.3 {RATIO} (ref 0–5)
HGB BLD-MCNC: 14.4 G/DL (ref 11.5–16)
HGB UR QL STRIP: NEGATIVE
HYALINE CASTS URNS QL MICRO: ABNORMAL /LPF (ref 0–5)
IMM GRANULOCYTES # BLD AUTO: 0 K/UL (ref 0–0.04)
IMM GRANULOCYTES NFR BLD AUTO: 0 % (ref 0–0.5)
KETONES UR QL STRIP.AUTO: NEGATIVE MG/DL
LDLC SERPL CALC-MCNC: 92.4 MG/DL (ref 0–100)
LEUKOCYTE ESTERASE UR QL STRIP.AUTO: NEGATIVE
LYMPHOCYTES # BLD: 1.8 K/UL (ref 0.8–3.5)
LYMPHOCYTES NFR BLD: 26 % (ref 12–49)
MCH RBC QN AUTO: 31.9 PG (ref 26–34)
MCHC RBC AUTO-ENTMCNC: 34.2 G/DL (ref 30–36.5)
MCV RBC AUTO: 93.3 FL (ref 80–99)
MONOCYTES # BLD: 0.5 K/UL (ref 0–1)
MONOCYTES NFR BLD: 7 % (ref 5–13)
NEUTS SEG # BLD: 4.5 K/UL (ref 1.8–8)
NEUTS SEG NFR BLD: 66 % (ref 32–75)
NITRITE UR QL STRIP.AUTO: NEGATIVE
NRBC # BLD: 0 K/UL (ref 0–0.01)
NRBC BLD-RTO: 0 PER 100 WBC
PH UR STRIP: 5 [PH] (ref 5–8)
PLATELET # BLD AUTO: 123 K/UL (ref 150–400)
POTASSIUM SERPL-SCNC: 4.8 MMOL/L (ref 3.5–5.1)
PROT SERPL-MCNC: 7.4 G/DL (ref 6.4–8.2)
PROT UR STRIP-MCNC: NEGATIVE MG/DL
RBC # BLD AUTO: 4.51 M/UL (ref 3.8–5.2)
RBC #/AREA URNS HPF: ABNORMAL /HPF (ref 0–5)
RBC MORPH BLD: ABNORMAL
SODIUM SERPL-SCNC: 141 MMOL/L (ref 136–145)
SP GR UR REFRACTOMETRY: 1 (ref 1–1.03)
TRIGL SERPL-MCNC: 78 MG/DL (ref ?–150)
TSH SERPL DL<=0.05 MIU/L-ACNC: 0.89 UIU/ML (ref 0.36–3.74)
UA: UC IF INDICATED,UAUC: ABNORMAL
UROBILINOGEN UR QL STRIP.AUTO: 0.2 EU/DL (ref 0.2–1)
VLDLC SERPL CALC-MCNC: 15.6 MG/DL
WBC # BLD AUTO: 6.9 K/UL (ref 3.6–11)
WBC URNS QL MICRO: ABNORMAL /HPF (ref 0–4)

## 2022-01-04 ENCOUNTER — OFFICE VISIT (OUTPATIENT)
Dept: NEUROLOGY | Age: 47
End: 2022-01-04
Payer: COMMERCIAL

## 2022-01-04 VITALS
BODY MASS INDEX: 24.9 KG/M2 | HEIGHT: 60 IN | SYSTOLIC BLOOD PRESSURE: 131 MMHG | OXYGEN SATURATION: 100 % | HEART RATE: 81 BPM | WEIGHT: 126.8 LBS | DIASTOLIC BLOOD PRESSURE: 87 MMHG | TEMPERATURE: 97.4 F

## 2022-01-04 DIAGNOSIS — G43.009 MIGRAINE WITHOUT AURA AND WITHOUT STATUS MIGRAINOSUS, NOT INTRACTABLE: Primary | ICD-10-CM

## 2022-01-04 PROCEDURE — 99214 OFFICE O/P EST MOD 30 MIN: CPT | Performed by: PSYCHIATRY & NEUROLOGY

## 2022-01-04 RX ORDER — PROPRANOLOL HYDROCHLORIDE 160 MG/1
160 CAPSULE, EXTENDED RELEASE ORAL
Qty: 90 CAPSULE | Refills: 3 | Status: SHIPPED | OUTPATIENT
Start: 2022-01-04

## 2022-01-04 RX ORDER — RIMEGEPANT SULFATE 75 MG/75MG
75 TABLET, ORALLY DISINTEGRATING ORAL
Qty: 8 TABLET | Refills: 5 | Status: SHIPPED | OUTPATIENT
Start: 2022-01-04 | End: 2022-03-17 | Stop reason: DRUGHIGH

## 2022-01-04 NOTE — PROGRESS NOTES
Follow-up Visit    Name Ivette Oviedo Age 55 y.o. MRN [id number]  1975       Chief Complaint: Migraine headaches  Ms. Dakota Flores  returns for a follow up visit. Since last being seen she  has been compliant with medications. No new health conditions have arisen. No new medications have been  prescribed. She has no new symptoms to address today. She has breakthrough migraines the last several hours in spite of using Maxalt, Fioricet and sumatriptan. We discussed the available medications for treatment. She was leery of the CGRP medications on account of them being new. I discussed the side effects and administration of these medications and their efficacy. She became more comfortable after the discussion. Assesment and Plan  1. Migraine without aura and without status migrainosus, not intractable    - propranolol LA (INDERAL LA) 160 mg capsule; Take 1 Capsule by mouth nightly. Dispense: 90 Capsule; Refill: 3  - butalbital-acetaminophen-caffein wild e (FIORICET, ESGIC) -40 mg per tablet; Take one to two by mouth on the onset of migraine. Do not use more than 10 days in a 30 day period  Dispense: 90 Tablet; Refill: 3  Failed maxalt and failed sumatriptan would like to try [facility] for break thru      Allergies  Patient has no known allergies. Medications  Current Outpatient Medications   Medication Sig    propranolol LA (INDERAL LA) 160 mg capsule Take 1 Capsule by mouth nightly.  butalbital-acetaminophen-caffeine (FIORICET, ESGIC) -40 mg per tablet Take one to two by mouth on the onset of migraine. Do not use more than 10 days in a 30 day period    omega-3 fatty acids (FISH OIL CONCENTRATE PO) Take  by mouth daily.  coenzyme q10 (Co Q-10) 10 mg cap Take  by mouth.  cholecalciferol, vitamin D3, (VITAMIN D3) 2,000 unit tab Take 5,000 Units by mouth daily.  b complex vitamins tablet Take 1 Tab by mouth daily.     C/sourcherry/celery/grape seed (TART CHERRY PO) Take 2 Caps by mouth daily.  OTHER Take 1 Cap by mouth daily. Indications: POMEGRANTE COMPLETE    mv-mn/C/glutamin/lysin/ivxf529 (AIRBORNE, ASCORBATE SODIUM, PO) Take 1 Gum by mouth daily.  tamoxifen (NOLVADEX) 20 mg tablet Take 20 mg by mouth nightly.  multivitamin (ONE A DAY) tablet Take 1 Tab by mouth daily. No current facility-administered medications for this visit. Medical History  Past Medical History:   Diagnosis Date    Arrhythmia     PALPITATIONS    Cancer ([facility] [address] 2018    BREAST, Right    Gestational diabetes     Headache     Headache 4/8/2019    Hypertension     Ill-defined condition     MIGRAINES    Palpitations     Prediabetes        Review of Systems   Eyes: Negative for blurred vision and double vision. Respiratory: Negative for cough and shortness of breath. Cardiovascular: Negative for chest pain, palpitations and orthopnea. Gastrointestinal: Negative for nausea and vomiting. Neurological: Positive for headaches. Negative for dizziness. Psychiatric/Behavioral: Negative for depression. The patient is not nervous/anxious. Exam:  Visit Vitals  /87   Pulse 81   Temp 97.4 °F (36.3 °C) (Temporal)   Ht 5' (1.524 m)   Wt 126 lb 12.8 oz (57.5 kg)   SpO2 100%   BMI 24.76 kg/m²        General: Well developed, well nourished. Patient in no apparent distress   Head: Normocephalic, atraumatic, anicteric sclera   Neck Normal ROM, No thyromegally   Cardiac: Regular rate and rhythm   Ext: No pedal edema   Skin: Supple no rash     NeurologicExam:  Mental Status: Alert and oriented to person place and time   Speech: Fluent no aphasia or dysarthria. Cranial Nerves:  II - XII Intact   Motor:  Full and symmetric strength . Normal bulk and tone. Sensory:   Symmetric and intact    Gait:  Gait is balanced and fluid with normal arm swing. Tremor:   No tremor noted. Cerebellar:  Coordination intact.               Lab Review  Lab Results   Component Value Date/Time    WBC 6.9 12/29/2021 04:33 PM    HCT 42.1 12/29/2021 04:33 PM    HGB 14.4 12/29/2021 04:33 PM    PLATELET 131 (L) 93/62/5001 04:33 PM       Lab Results   Component Value Date/Time    Sodium 141 12/29/2021 04:33 PM    Potassium 4.8 12/29/2021 04:33 PM    Chloride 111 (H) 12/29/2021 04:33 PM    CO2 25 12/29/2021 04:33 PM    Glucose 103 (H) 12/29/2021 04:33 PM    BUN 14 12/29/2021 04:33 PM    Creatinine 1.07 (H) 12/29/2021 04:33 PM    Calcium 9.8 12/29/2021 04:33 PM         Lab Results   Component Value Date/Time    Cholesterol, total 156 12/29/2021 04:33 PM    HDL Cholesterol 48 12/29/2021 04:33 PM    LDL, calculated 92.4 12/29/2021 04:33 PM    VLDL, calculated 15.6 12/29/2021 04:33 PM    Triglyceride 78 12/29/2021 04:33 PM    CHOL/HDL Ratio 3.3 12/29/2021 04:33 PM

## 2022-01-06 DIAGNOSIS — G43.009 MIGRAINE WITHOUT AURA AND WITHOUT STATUS MIGRAINOSUS, NOT INTRACTABLE: Primary | ICD-10-CM

## 2022-01-07 ENCOUNTER — TELEPHONE (OUTPATIENT)
Dept: NEUROLOGY | Age: 47
End: 2022-01-07

## 2022-01-15 ENCOUNTER — TELEPHONE (OUTPATIENT)
Dept: NEUROLOGY | Age: 47
End: 2022-01-15

## 2022-03-17 ENCOUNTER — OFFICE VISIT (OUTPATIENT)
Dept: NEUROLOGY | Age: 47
End: 2022-03-17
Payer: COMMERCIAL

## 2022-03-17 VITALS
WEIGHT: 126 LBS | OXYGEN SATURATION: 99 % | HEIGHT: 60 IN | BODY MASS INDEX: 24.74 KG/M2 | HEART RATE: 85 BPM | DIASTOLIC BLOOD PRESSURE: 88 MMHG | RESPIRATION RATE: 16 BRPM | SYSTOLIC BLOOD PRESSURE: 132 MMHG | TEMPERATURE: 97.6 F

## 2022-03-17 DIAGNOSIS — G43.009 MIGRAINE WITHOUT AURA AND WITHOUT STATUS MIGRAINOSUS, NOT INTRACTABLE: Primary | ICD-10-CM

## 2022-03-17 PROCEDURE — 99215 OFFICE O/P EST HI 40 MIN: CPT | Performed by: PSYCHIATRY & NEUROLOGY

## 2022-03-17 RX ORDER — RIMEGEPANT SULFATE 75 MG/75MG
75 TABLET, ORALLY DISINTEGRATING ORAL EVERY OTHER DAY
Qty: 16 TABLET | Refills: 1 | Status: SHIPPED | OUTPATIENT
Start: 2022-03-17 | End: 2022-08-09 | Stop reason: SDUPTHER

## 2022-03-17 NOTE — PROGRESS NOTES
Chief Complaint   Patient presents with    Migraine     Referred by Dr. Mellissa Guevara for migraines; takes propanolol and nurtec but continues to get migraines monthly     Visit Vitals  /88 (BP 1 Location: Left arm, BP Patient Position: Sitting, BP Cuff Size: Large adult)   Pulse 85   Temp 97.6 °F (36.4 °C) (Temporal) Comment: N/A   Resp 16   Ht 5' (1.524 m)   Wt 57.2 kg (126 lb)   SpO2 99%   BMI 24.61 kg/m²

## 2022-03-17 NOTE — PROGRESS NOTES
Chief Complaint   Patient presents with    Migraine     Referred by Dr. Vira Thibodeaux for migraines; takes propanolol and nurtec but continues to get migraines monthly       HISTORY OF PRESENT ILLNESS  Fanny Goncalves is a 52 y.o. female who is coming in to establish care regarding migraines. She was seeing  who has moved out of the area. She has migraine headaches and has had them since age 15. She still gets them, at least 10 days out of the month, mainly centered around the menstrual cycle. Duration can be few hours to several days in a row. Headache is mainly retro-orbital, sharp throbbing pain, associated with photophobia, phonophobia and nausea. No other focal motor or sensory deficits. She has not found out any other triggers. She has tried sumatriptan and rizatriptan for abortive therapy and these have not helped. Lately she has been taking Nurtec which works. She has also taken Fioricet in the past but not anymore. Taking atenolol extended release 160 mg daily which is also for blood pressure control which he continues to get at least 9-10 headache days per month despite taking this dose. Past Medical History:   Diagnosis Date    Arrhythmia     PALPITATIONS    Cancer ([facility] [address] 2018    BREAST, Right    Gestational diabetes     Headache     Headache 4/8/2019    Hypertension     Ill-defined condition     MIGRAINES    Palpitations     Prediabetes      Current Outpatient Medications   Medication Sig    rimegepant (Nurtec ODT) 75 mg disintegrating tablet Take 1 Tablet by mouth every other day.  propranolol LA (INDERAL LA) 160 mg capsule Take 1 Capsule by mouth nightly.  omega-3 fatty acids (FISH OIL CONCENTRATE PO) Take  by mouth daily.  coenzyme q10 (Co Q-10) 10 mg cap Take  by mouth.  cholecalciferol, vitamin D3, (VITAMIN D3) 2,000 unit tab Take 5,000 Units by mouth daily.  b complex vitamins tablet Take 1 Tab by mouth daily.     C/sourcherry/celery/grape seed (TART CHERRY PO) Take 2 Caps by mouth daily.  OTHER Take 1 Cap by mouth daily. Indications: POMEGRANTE COMPLETE    mv-mn/C/glutamin/lysin/lxvd337 (AIRBORNE, ASCORBATE SODIUM, PO) Take 1 Gum by mouth daily.  tamoxifen (NOLVADEX) 20 mg tablet Take 20 mg by mouth nightly.  multivitamin (ONE A DAY) tablet Take 1 Tab by mouth daily. No current facility-administered medications for this visit.      No Known Allergies  Family History   Problem Relation Age of Onset    Diabetes Mother     Asthma Mother     Hypertension Mother     Diabetes Father     Heart Disease Father     Glaucoma Father     No Known Problems Sister     Diabetes Brother     No Known Problems Sister     Hypertension Brother     Breast Cancer Other         66's    Anesth Problems Neg Hx      Social History     Tobacco Use    Smoking status: Never Smoker    Smokeless tobacco: Never Used   Vaping Use    Vaping Use: Never used   Substance Use Topics    Alcohol use: No    Drug use: No     Past Surgical History:   Procedure Laterality Date    HX BREAST BIOPSY Right 02/2018    HX BREAST RECONSTRUCTION Bilateral 4/10/2018    BREAST RECONSTRUCTION performed by Shu De Jesus MD at [facility] AMBULATORY OR    HX BREAST RECONSTRUCTION Bilateral 6/18/2019    REMOVE BILATERAL BREAST TISSUE EXPANDERS, REVISION OF RECONSTRUCTED BREASTS,  PLACE BILATERAL BREAST IMPLANTS, FAT GRAFTING FROM ABDOMEN TO BILATERAL BREASTS  AND REMOVAL OF JOHAN CATH performed by Shu De Jesus MD at [facility] AMBULATORY OR    HX BREAST RECONSTRUCTION Bilateral 6/18/2019    BREAST RECONSTRUCTION performed by Shu De Jesus MD at [address] [place] [address] MASTECTOMY Bilateral 4/10/2018    BILATERAL BREAST MASTECTOMIES, RIGHT BREAST SENTINEL NODE BIOPSY (SN 4-9 3:00), BILATERAL PRE-PEC VS SUB-PEC TISSUE EXPANDERS AND ALLODERM WITH DERMAL MATRIX, PORTACATH INSERTION performed by Alanna Alegre MD at [facility] AMBULATORY OR    HX WISDOM TEETH EXTRACTION           REVIEW OF SYSTEMS  Review of Systems - History obtained from the patient  Psychological ROS: negative  ENT ROS: negative  Hematological and Lymphatic ROS: negative  Endocrine ROS: negative  Respiratory ROS: no cough, shortness of breath, or wheezing  Cardiovascular ROS: no chest pain or dyspnea on exertion  Gastrointestinal ROS: no abdominal pain, change in bowel habits, or black or bloody stools  Genito-Urinary ROS: no dysuria, trouble voiding, or hematuria  Musculoskeletal ROS: negative  Dermatological ROS: negative      PHYSICAL EXAMINATION:    Visit Vitals  /88 (BP 1 Location: Left arm, BP Patient Position: Sitting, BP Cuff Size: Large adult)   Pulse 85   Temp 97.6 °F (36.4 °C) (Temporal)   Resp 16   Ht 5' (1.524 m)   Wt 126 lb (57.2 kg)   SpO2 99%   BMI 24.61 kg/m²     General:  Well groomed individual in no acute distress. Neck: Supple, nontender, no bruits, no pain with resistance to active range of motion. Heart: Regular rate and rhythm. Normal S1S2. Lungs:  Equal chest expansion, no cough, no wheeze  Musculoskeletal:  Extremities revealed no edema and had full range of motion of joints. Psych:  Good mood and bright affect    NEUROLOGICAL EXAMINATION:     Mental Status:   Alert and oriented to person, place, and time with recent and remote memory intact. Attention span and concentration are normal. Speech is fluent. Cranial Nerves:    II, III, IV, VI:  Visual acuity grossly intact. Visual fields are normal.    Pupils are equal, round, and reactive to light. Extra-ocular movements are full and fluid. Fundoscopic exam was benign, no ptosis or nystagmus. V-XII: Hearing is grossly intact. Facial features are symmetric, with normal sensation and strength. The palate rises symmetrically and the tongue protrudes midline. Sternocleidomastoids 5/5. Motor Examination: Normal tone, bulk, and strength. 5/5 muscle strength throughout. No cogwheel rigidity or clonus present.       Sensory exam: Normal throughout to pinprick, temperature, and vibration sense. Normal proprioception. Coordination:  Finger to nose and rapid arm movement testing was normal.   No resting or intention tremor    Gait and Station:  Steady while walking on toes, heels, and with tandem walking. Normal arm swing. No Rhomberg or pronator drift. No muscle wasting or fasiculations noted. Reflexes:  DTRs 2+ throughout. Toes downgoing. LABS / IMAGING  Lab Results   Component Value Date/Time    WBC 6.9 12/29/2021 04:33 PM    HGB 14.4 12/29/2021 04:33 PM    HCT 42.1 12/29/2021 04:33 PM    PLATELET 468 (L) 63/19/4259 04:33 PM    MCV 93.3 12/29/2021 04:33 PM     Lab Results   Component Value Date/Time    Sodium 141 12/29/2021 04:33 PM    Potassium 4.8 12/29/2021 04:33 PM    Chloride 111 (H) 12/29/2021 04:33 PM    CO2 25 12/29/2021 04:33 PM    Anion gap 5 12/29/2021 04:33 PM    Glucose 103 (H) 12/29/2021 04:33 PM    BUN 14 12/29/2021 04:33 PM    Creatinine 1.07 (H) 12/29/2021 04:33 PM    BUN/Creatinine ratio 13 12/29/2021 04:33 PM    GFR est AA >60 12/29/2021 04:33 PM    GFR est non-AA 55 (L) 12/29/2021 04:33 PM    Calcium 9.8 12/29/2021 04:33 PM    Bilirubin, total 0.5 12/29/2021 04:33 PM    Alk. phosphatase 48 12/29/2021 04:33 PM    Protein, total 7.4 12/29/2021 04:33 PM    Albumin 4.0 12/29/2021 04:33 PM    Globulin 3.4 12/29/2021 04:33 PM    A-G Ratio 1.2 12/29/2021 04:33 PM    ALT (SGPT) 13 12/29/2021 04:33 PM    AST (SGOT) 18 12/29/2021 04:33 PM         ASSESSMENT    ICD-10-CM ICD-9-CM    1. Migraine without aura and without status migrainosus, not intractable  G43.009 346.10 rimegepant (Nurtec ODT) 75 mg disintegrating tablet       DISCUSSION  Ms. Ivy Brock has common migraines without aura and she also has menstrual migraines.   Treatment strategies for migraines were again reviewed including potential triggers and she has not identified any  She has tried sumatriptan and rizatriptan without benefit has not been taking Nurtec which works  However, she continues to get at least 10 migraine days per month.   I have recommended increasing Nurtec to 75 mg every other day which can work as an abortive and a prophylactic  May continue propanolol which seems to be controlling her blood pressure, tachycardia/palpitations well  Follow-up annually or earlier if needed      Kirby Porter MD  Diplomate, American Board of Psychiatry & Neurology (Neurology)  Diplomate, [address] [place] [address] Rd., [address] of Psychiatry & Neurology (Clinical Neurophysiology)  [facility] [employer]

## 2022-03-18 PROBLEM — E55.9 VITAMIN D DEFICIENCY: Status: ACTIVE | Noted: 2021-12-29

## 2022-03-19 PROBLEM — C50.411 MALIGNANT NEOPLASM OF UPPER-OUTER QUADRANT OF RIGHT BREAST IN FEMALE, ESTROGEN RECEPTOR POSITIVE (HCC): Status: ACTIVE | Noted: 2018-02-14

## 2022-03-19 PROBLEM — Z17.0 MALIGNANT NEOPLASM OF UPPER-OUTER QUADRANT OF RIGHT BREAST IN FEMALE, ESTROGEN RECEPTOR POSITIVE (HCC): Status: ACTIVE | Noted: 2018-02-14

## 2022-03-19 PROBLEM — R51.9 HEADACHE: Status: ACTIVE | Noted: 2019-04-08

## 2022-03-19 PROBLEM — I10 HYPERTENSION: Status: ACTIVE | Noted: 2019-04-08

## 2022-03-20 PROBLEM — Z90.13 S/P MASTECTOMY, BILATERAL: Status: ACTIVE | Noted: 2018-07-23

## 2022-03-20 PROBLEM — C50.411 BREAST CANCER OF UPPER-OUTER QUADRANT OF RIGHT FEMALE BREAST (HCC): Status: ACTIVE | Noted: 2018-04-10

## 2022-03-29 ENCOUNTER — OFFICE VISIT (OUTPATIENT)
Dept: SURGERY | Age: 47
End: 2022-03-29
Payer: COMMERCIAL

## 2022-03-29 VITALS
HEIGHT: 60 IN | WEIGHT: 126 LBS | DIASTOLIC BLOOD PRESSURE: 64 MMHG | HEART RATE: 85 BPM | SYSTOLIC BLOOD PRESSURE: 137 MMHG | BODY MASS INDEX: 24.74 KG/M2

## 2022-03-29 DIAGNOSIS — C50.411 MALIGNANT NEOPLASM OF UPPER-OUTER QUADRANT OF RIGHT FEMALE BREAST, UNSPECIFIED ESTROGEN RECEPTOR STATUS (HCC): Primary | ICD-10-CM

## 2022-03-29 DIAGNOSIS — Z85.3 HISTORY OF BREAST CANCER: ICD-10-CM

## 2022-03-29 DIAGNOSIS — Z90.13 S/P MASTECTOMY, BILATERAL: ICD-10-CM

## 2022-03-29 PROCEDURE — 99213 OFFICE O/P EST LOW 20 MIN: CPT | Performed by: NURSE PRACTITIONER

## 2022-03-29 NOTE — PROGRESS NOTES
HISTORY OF PRESENT ILLNESS  America Flores is a 52 y.o. female. HPI ESTABLISHED patient here for follow-up of RIGHT breast cancer, S/P BILATERAL mastectomies with reconstruction.  No breast complaints or breast pain.           History of breast cancer-   Referring - Dr. 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  HER2 positive by [facility]  4/2018- bilateral mastectomy, RIGHT SLNB with reconstruction and port insertion - Dr. Princess Muller with Dr. Amy Ch  Completed chemotherapy. Completed Herceptin in 4/2019. Currently taking Tamoxifen. Followed by Dr. Shelton Caba       FH: Maternal cousin diagnosed with breast cancer in her 66's. OB History    No obstetric history on file. Obstetric Comments   Menarche:  15. LMP: 2/8/18. # of Children:  1. Age at Delivery of First Child:  25.   Hysterectomy/oophorectomy:  NO/NO. Breast Bx:  yes. Hx of Breast Feeding:  no. BCP:  yes.  Hormone therapy:  no.                    Past Surgical History:   Procedure Laterality Date    HX BREAST BIOPSY Right 02/2018    HX BREAST RECONSTRUCTION Bilateral 4/10/2018    BREAST RECONSTRUCTION performed by Neil Auguste MD at [address] HX BREAST RECONSTRUCTION Bilateral 6/18/2019    REMOVE BILATERAL BREAST TISSUE EXPANDERS, REVISION OF RECONSTRUCTED BREASTS,  PLACE BILATERAL BREAST IMPLANTS, FAT GRAFTING FROM ABDOMEN TO BILATERAL BREASTS  AND REMOVAL OF JOHAN CATH performed by Neil Auguste MD at [address] HX BREAST RECONSTRUCTION Bilateral 6/18/2019    BREAST RECONSTRUCTION performed by Neil Auguste MD at [address] HX MASTECTOMY Bilateral 4/10/2018    BILATERAL BREAST MASTECTOMIES, RIGHT BREAST SENTINEL NODE BIOPSY (SN 4-9 3:00), BILATERAL PRE-PEC VS SUB-PEC TISSUE EXPANDERS AND ALLODERM WITH DERMAL MATRIX, PORTACATH INSERTION performed by Jono Vásquez MD at [address] [place] [address]           MRI Results (most recent):  Results from [place] encounter on 10/26/21    MRI BREAST BI W WO CONT    Narrative  INDICATION: Right breast carcinoma, post bilateral mastectomies. Right breast  lump. COMPARISON: 2/26/2018. TECHNIQUE:  Multisequence, multiplanar, bilateral breast MRI was performed in prone position  using a dedicated breast coil. Images were obtained without contrast and dynamic  postcontrast images were obtained in multiple phases. 10 mL IV gadoterate  meglumine (Dotarem) was administered. Subtraction images were reconstructed. Postcontrast images were reviewed with dedicated kinetic analysis software. FINDINGS:  There is mild background parenchymal enhancement and heterogeneous  fibroglandular tissue. Post bilateral mastectomies and saline implant  reconstructions. The implants are intact. No suspicious enhancing foci. No  axillary or internal mammary chain lymphadenopathy. Impression  1. No suspicious enhancement. 2. No lymphadenopathy. 3. BI-RADS Assessment Category 2: Benign finding. ROS    Physical Exam  Constitutional:       Appearance: Normal appearance. Chest:   Breasts:      Right: Absent. No axillary adenopathy or supraclavicular adenopathy. Left: Absent. No axillary adenopathy or supraclavicular adenopathy. Comments: Chest wall s/p BILATERAL mastectomy with implant reconstruction  Musculoskeletal:      Comments: FROM - UE x 2   Lymphadenopathy:      Upper Body:      Right upper body: No supraclavicular or axillary adenopathy. Left upper body: No supraclavicular or axillary adenopathy. Neurological:      Mental Status: She is alert.    Psychiatric:         Attention and Perception: Attention normal.         Mood and Affect: Mood normal.         Speech: Speech normal.         Behavior: Behavior normal.         Visit Vitals  /64 (BP 1 Location: Left upper arm, BP Patient Position: Sitting, BP Cuff Size: Adult)   Pulse 85   Ht 5' (1.524 m)   Wt 126 lb (57.2 kg)   BMI 24.61 kg/m²         ASSESSMENT and PLAN    ICD-10-CM ICD-9-CM    1. Malignant neoplasm of upper-outer quadrant of right female breast, unspecified estrogen receptor status (HCC)  C50.411 174.4    2. S/P mastectomy, bilateral  Z90.13 V45.71    3. History of breast cancer  Z85.3 V10.3         LEFT reconstructed breast - normal exam.  RIGHT reconstructed breast - puffy nodular area at center of breast - MRI in 10/2021 normal.   Stable on exam.   Continues care with Dr. Brynn Reyes. RTC in 1 year or sooner PRN. She is comfortable with this plan. All questions answered and she stated understanding.                       Total time spent for this patient - 20 minutes.

## 2022-03-29 NOTE — PATIENT INSTRUCTIONS
Eating Healthy Foods: Care Instructions  Your Care Instructions     Eating healthy foods can help lower your risk for disease. Healthy food gives you energy and keeps your heart strong, your brain active, your muscles working, and your bones strong. A healthy diet includes a variety of foods from the basic food groups: grains, vegetables, fruits, milk and milk products, and meat and beans. Some people may eat more of their favorite foods from only one food group and, as a result, miss getting the nutrients they need. So, it is important to pay attention not only to what you eat but also to what you are missing from your diet. You can eat a healthy, balanced diet by making a few small changes. Follow-up care is a key part of your treatment and safety. Be sure to make and go to all appointments, and call your doctor if you are having problems. It's also a good idea to know your test results and keep a list of the medicines you take. How can you care for yourself at home? Look at what you eat  · Keep a food diary for a week or two and record everything you eat or drink. Track the number of servings you eat from each food group. · For a balanced diet every day, eat a variety of:  ? 6 or more ounce-equivalents of grains, such as cereals, breads, crackers, rice, or pasta, every day. An ounce-equivalent is 1 slice of bread, 1 cup of ready-to-eat cereal, or ½ cup of cooked rice, cooked pasta, or cooked cereal.  ? 2½ cups of vegetables, especially:  § Dark-green vegetables such as broccoli and spinach. § Orange vegetables such as carrots and sweet potatoes. § Dry beans (such as sandoval and kidney beans) and peas (such as lentils). ? 2 cups of fresh, frozen, or canned fruit. A small apple or 1 banana or orange equals 1 cup. ? 3 cups of nonfat or low-fat milk, yogurt, or other milk products. ? 5½ ounces of meat and beans, such as chicken, fish, lean meat, beans, nuts, and seeds.  One egg, 1 tablespoon of peanut butter, ½ ounce nuts or seeds, or ¼ cup of cooked beans equals 1 ounce of meat. · Learn how to read food labels for serving sizes and ingredients. Fast-food and convenience-food meals often contain few or no fruits or vegetables. Make sure you eat some fruits and vegetables to make the meal more nutritious. · Look at your food diary. For each food group, add up what you have eaten and then divide the total by the number of days. This will give you an idea of how much you are eating from each food group. See if you can find some ways to change your diet to make it more healthy. Start small  · Do not try to make dramatic changes to your diet all at once. You might feel that you are missing out on your favorite foods and then be more likely to fail. · Start slowly, and gradually change your habits. Try some of the following:  ? Use whole wheat bread instead of white bread. ? Use nonfat or low-fat milk instead of whole milk. ? Eat brown rice instead of white rice, and eat whole wheat pasta instead of white-flour pasta. ? Try low-fat cheeses and low-fat yogurt. ? Add more fruits and vegetables to meals and have them for snacks. ? Add lettuce, tomato, cucumber, and onion to sandwiches. ? Add fruit to yogurt and cereal.  Enjoy food  · You can still eat your favorite foods. You just may need to eat less of them. If your favorite foods are high in fat, salt, and sugar, limit how often you eat them, but do not cut them out entirely. · Eat a wide variety of foods. Make healthy choices when eating out  · The type of restaurant you choose can help you make healthy choices. Even fast-food chains are now offering more low-fat or healthier choices on the menu. · Choose smaller portions, or take half of your meal home. · When eating out, try:  ? A veggie pizza with a whole wheat crust or grilled chicken (instead of sausage or pepperoni).   ? Pasta with roasted vegetables, grilled chicken, or marinara sauce instead of cream sauce. ? A vegetable wrap or grilled chicken wrap. ? Broiled or poached food instead of fried or breaded items. Make healthy choices easy  · Buy packaged, prewashed, ready-to-eat fresh vegetables and fruits, such as baby carrots, salad mixes, and chopped or shredded broccoli and cauliflower. · Buy packaged, presliced fruits, such as melon or pineapple. · Choose 100% fruit or vegetable juice instead of soda. Limit juice intake to 4 to 6 oz (½ to ¾ cup) a day. · Blend low-fat yogurt, fruit juice, and canned or frozen fruit to make a smoothie for breakfast or a snack. Where can you learn more? Go to http://www.cifuentes.com/  Enter T756 in the search box to learn more about \"Eating Healthy Foods: Care Instructions. \"  Current as of: September 8, 2021               Content Version: 13.2  © 2006-2022 [employer]. Care instructions adapted under license by [employer] (which disclaims liability or warranty for this information). If you have questions about a medical condition or this instruction, always ask your healthcare professional. [place] [address] any warranty or liability for your use of this information.

## 2022-05-02 ENCOUNTER — TELEPHONE (OUTPATIENT)
Dept: NEUROLOGY | Age: 47
End: 2022-05-02

## 2022-05-02 NOTE — TELEPHONE ENCOUNTER
Bryan Jarvis calling to see where the process for the patient plan. The key is [id number]    Please call.

## 2022-06-28 NOTE — PROGRESS NOTES
Subjective:     Chief Complaint   Patient presents with   Dru 52 is a 52 y.o. F.  She has a history of hypertension and headaches. Her previous primary care provider was Dr. Farnaz Oneal. I reviewed the medical record. She was last seen here in December for routine follow-up. Inderal was being used to manage her hypertension. She was otherwise feeling fine at the time. Physical examination at the time had been unremarkable. She was referred for routine laboratory studies. No changes were made to her medication regimen. She was seen in follow-up in the neurology clinic for her history of migraine headaches. Propranolol was felt to be insufficiently effective for her headaches, and they had discussed CGRP medications. Nurtec was added for breakthrough pain. More recently, the patient was seen in late March in the oncology clinic for her history of right-sided breast cancer. She was noted to be status post previous bilateral mastectomies with reconstruction. She was otherwise felt to be doing generally well without other symptoms at the time. She was advised to return to clinic in 1 year and was felt to be disease-free at that point. Today, the patient comes in for establishment, follow-up on her chronic medical concerns, and routine preventive care. She reports feeling great overall today and has no significant acute medical plaints. She reports having undergone colonoscopy back in January of this year, at the direction of her women's health specialist.  This, she says, was unremarkable except for a hyperplastic polyp, and a 10-year follow-up colonoscopy had been recommended at the time. She also continues to follow-up regularly with her breast surgeon, as noted above, and is felt to be in clinical remission. She continues on tamoxifen given the ER positive receptor positivity of her previous cancer.   She reports tolerating this well although she has had occasional hot flashes. She continues to follow-up regularly with her gynecologist for her women's health issues. She notes that with the recent addition of the Nurtec, she has had no significant headaches. Propranolol continues to be overall effective for her migraines. Her migraine burden overall is minimal at this point, about 2-3 headache days at most per month. These are well controlled with the Nurtec overall. She has follow-up pending with the neurology clinic in the next 4 months. No other recent change to her headache burden or quality of her headaches. She otherwise reports feeling generally well and has no further somatic complaints. She has a history of gestational diabetes, and as a result has been monitored for development of prediabetes over the past several years. Her most recent serum chemistries in December had demonstrated mild hyperglycemia. She denies any polyuria or polydipsia. Routine Healthcare Maintenance issues are reviewed and discussed with the patient as noted below. Orders to update gaps in healthcare maintenance were placed as noted below in the Assessment and Plan, where applicable. She is up-to-date with regard to routine screening examinations as reviewed with the patient above. Review of systems is otherwise negative. 10-year Cardiovascular Risk Corrina Marti, 2013): The 10-year ASCVD risk score (Venus Douglas et al., 2013) is: 2.7%    Values used to calculate the score:      Age: 52 years      Sex: Female      Is Non- : Yes      Diabetic: No      Tobacco smoker: No      Systolic Blood Pressure: 741 mmHg      Is BP treated: Yes      HDL Cholesterol: 48 MG/DL      Total Cholesterol: 156 MG/DL       Past Medical History:  Past Medical History:   Diagnosis Date    History of breast cancer 2018    T1 N0 IDC Grade 2 Er/Pr+ Her 2 foreign equivocal right breast cancer.   HER2 positive by FISH    History of palpitations     Hypertension     Long term (current) use of selective estrogen receptor modulators (serms)     Migraine headache     Prediabetes     Vitamin D deficiency        Past Surgical Histor:  Past Surgical History:   Procedure Laterality Date    HX BREAST BIOPSY Right 02/2018    HX BREAST RECONSTRUCTION Bilateral 4/10/2018    BREAST RECONSTRUCTION performed by Alex Montemayor MD at [address] HX BREAST RECONSTRUCTION Bilateral 6/18/2019    REMOVE BILATERAL BREAST TISSUE EXPANDERS, REVISION OF RECONSTRUCTED BREASTS,  PLACE BILATERAL BREAST IMPLANTS, FAT GRAFTING FROM ABDOMEN TO BILATERAL BREASTS  AND REMOVAL OF JOHAN CATH performed by Alex Montemayor MD at [address] HX BREAST RECONSTRUCTION Bilateral 6/18/2019    BREAST RECONSTRUCTION performed by Alex Montemayor MD at [address] HX MASTECTOMY Bilateral 4/10/2018    BILATERAL BREAST MASTECTOMIES, RIGHT BREAST SENTINEL NODE BIOPSY (SN 4-9 3:00), BILATERAL PRE-PEC VS SUB-PEC TISSUE EXPANDERS AND ALLODERM WITH DERMAL MATRIX, PORTACATH INSERTION performed by Warden Eisenmenger, MD at [facility] WISDOM TEETH EXTRACTION         Allergies:  No Known Allergies    Medications:  Current Outpatient Medications   Medication Sig Dispense Refill    rimegepant (Nurtec ODT) 75 mg disintegrating tablet Take 1 Tablet by mouth every other day. 16 Tablet 1    propranolol LA (INDERAL LA) 160 mg capsule Take 1 Capsule by mouth nightly. 90 Capsule 3    omega-3 fatty acids (FISH OIL CONCENTRATE PO) Take  by mouth daily.  coenzyme q10 (Co Q-10) 10 mg cap Take  by mouth.  cholecalciferol, vitamin D3, (VITAMIN D3) 2,000 unit tab Take 5,000 Units by mouth daily.  b complex vitamins tablet Take 1 Tab by mouth daily.  C/sourcherry/celery/grape seed (TART CHERRY PO) Take 2 Caps by mouth daily.  OTHER Take 1 Cap by mouth daily.  Indications: POMEGRANTE COMPLETE      mv-mn/C/glutamin/lysin/pwdn212 (AIRBORNE, ASCORBATE SODIUM, PO) Take 1 Gum by mouth daily.      tamoxifen (NOLVADEX) 20 mg tablet Take 20 mg by mouth nightly.  multivitamin (ONE A DAY) tablet Take 1 Tab by mouth daily. Social History:  Social History     Socioeconomic History    Marital status:     Number of children: 1   Occupational History    Occupation:    Tobacco Use    Smoking status: Never Smoker    Smokeless tobacco: Never Used   Vaping Use    Vaping Use: Never used   Substance and Sexual Activity    Alcohol use: No    Drug use: No     Social Determinants of Health     Physical Activity: Insufficiently Active    Days of Exercise per Week: 3 days    Minutes of Exercise per Session: 20 min       Family History:  Family History   Problem Relation Age of Onset    Diabetes Mother     Asthma Mother     Hypertension Mother     Diabetes Father     Heart Disease Father     Glaucoma Father     No Known Problems Sister     Diabetes Brother     No Known Problems Sister     Hypertension Brother     Breast Cancer Other         66's    Anesth Problems Neg Hx        Immunizations:  Immunization History   Administered Date(s) Administered    COVID-19, PFIZER PURPLE top, DILUTE for use, (age 15 y+), IM, 30mcg/0.3mL 04/10/2021, 05/01/2021, 01/22/2022        Healthcare Maintenance:  Health Maintenance   Topic Date Due    DTaP/Tdap/Td series (1 - Tdap) Never done    Cervical cancer screen  04/24/2022    A1C test (Diabetic or Prediabetic)  12/01/2022 (Originally 3/5/1985)    Flu Vaccine (Season Ended) 09/01/2022    Depression Screen  03/17/2023    Lipid Screen  12/29/2026    Colorectal Cancer Screening Combo  01/01/2032    Hepatitis C Screening  Completed    COVID-19 Vaccine  Completed    Pneumococcal 0-64 years  Aged Out        Review of Systems:  ROS:  Review of Systems   Constitutional: Negative. HENT: Negative. Eyes: Negative. Respiratory: Negative. Cardiovascular: Negative. Gastrointestinal: Negative.     Genitourinary: Negative. Musculoskeletal: Negative. Skin: Negative. Neurological: Negative. Endo/Heme/Allergies: Negative. Psychiatric/Behavioral: Negative. ROS otherwise negative      Objective:     Vital Signs:  Visit Vitals  /80 (BP 1 Location: Left upper arm, BP Patient Position: Sitting, BP Cuff Size: Adult)   Pulse 84   Temp 99.1 °F (37.3 °C) (Oral)   Resp 16   Ht 5' (1.524 m)   Wt 128 lb 12.8 oz (58.4 kg)   LMP 06/04/2022   SpO2 99%   BMI 25.15 kg/m²       BMI:  Body mass index is 25.15 kg/m². Physical Examination:  Physical Exam  Constitutional:       Appearance: Normal appearance. She is normal weight. HENT:      Head: Normocephalic and atraumatic. Right Ear: External ear normal.      Left Ear: External ear normal.      Nose: Nose normal.      Mouth/Throat:      Mouth: Mucous membranes are moist.      Pharynx: Oropharynx is clear. No oropharyngeal exudate or posterior oropharyngeal erythema. Cardiovascular:      Rate and Rhythm: Normal rate and regular rhythm. Pulses: Normal pulses. Heart sounds: Normal heart sounds. No murmur heard. No friction rub. No gallop. Pulmonary:      Effort: Pulmonary effort is normal. No respiratory distress. Breath sounds: Normal breath sounds. No wheezing, rhonchi or rales. Abdominal:      General: Abdomen is flat. Bowel sounds are normal. There is no distension. Palpations: Abdomen is soft. Tenderness: There is no abdominal tenderness. There is no guarding. Musculoskeletal:         General: No swelling, tenderness or deformity. Normal range of motion. Cervical back: Normal range of motion and neck supple. No rigidity or tenderness. Skin:     General: Skin is warm and dry. Findings: No erythema, lesion or rash. Neurological:      General: No focal deficit present. Mental Status: She is alert and oriented to person, place, and time. Mental status is at baseline. Sensory: No sensory deficit. Motor: No weakness. Gait: Gait normal.      Deep Tendon Reflexes: Reflexes normal.   Psychiatric:         Mood and Affect: Mood normal.         Behavior: Behavior normal.         Judgment: Judgment normal.          Physical exam otherwise negative    Diagnostic Testing:    Laboratory Studies:  Office Visit on 12/29/2021   Component Date Value Ref Range Status    Hep C virus Ab Interp. 12/29/2021 NONREACTIVE  NONREACTIVE   Final    Method used is Siemens Advia Centaur    Vitamin D 25-Hydroxy 12/29/2021 33.7  30 - 100 ng/mL Final    Comment: (NOTE)  Deficiency               <20 ng/mL  Insufficiency          20-30 ng/mL  Sufficient             ng/mL  Possible toxicity       >100 ng/mL    The Method used is Siemens Advia Centaur currently standardized to a   Center of Disease Control and Prevention (CDC) certified reference   [facility]. Samples containing fluorescein dye can produce falsely   elevated values when tested with the ADVIA Centaur Vitamin D Assay. It is recommended that results in the toxic range, >100 ng/mL, be   retested 72 hours post fluorescein exposure.       Color 12/29/2021 YELLOW/STRAW    Final    Color Reference Range: Straw, Yellow or Dark Yellow    Appearance 12/29/2021 CLEAR  CLEAR   Final    Specific gravity 12/29/2021 1.003  1.003 - 1.030   Final    pH (UA) 12/29/2021 5.0  5.0 - 8.0   Final    Protein 12/29/2021 Negative  Negative mg/dL Final    Glucose 12/29/2021 Negative  Negative mg/dL Final    Ketone 12/29/2021 Negative  Negative mg/dL Final    Bilirubin 12/29/2021 Negative  Negative   Final    Blood 12/29/2021 Negative  Negative   Final    Urobilinogen 12/29/2021 0.2  0.2 - 1.0 EU/dL Final    Nitrites 12/29/2021 Negative  Negative   Final    Leukocyte Esterase 12/29/2021 Negative  Negative   Final    WBC 12/29/2021 0-4  0 - 4 /hpf Final    RBC 12/29/2021 0-5  0 - 5 /hpf Final    Epithelial cells 12/29/2021 FEW  FEW /lpf Final    Comment: Epithelial cell category consists of squamous cells and /or transitional  urothelial cells. Renal tubular cells, if present, are separately identified as  such.  Bacteria 12/29/2021 1+* Negative /hpf Final    UA:UC IF INDICATED 12/29/2021 CULTURE NOT INDICATED BY UA RESULT  CULTURE NOT INDICATED BY UA RESULT   Final    Hyaline cast 12/29/2021 0-2  0 - 5 /lpf Final    TSH 12/29/2021 0.89  0.36 - 3.74 uIU/mL Final    Comment:   Due to TSH heterogeneity, both structurally and degree of glycosylation,  monoclonal antibodies used in the TSH assay may not accurately quantitate TSH. Therefore, this result should be correlated with clinical findings as well as  with other assessments of thyroid function, e.g., free T4, free T3.  Sodium 12/29/2021 141  136 - 145 mmol/L Final    Potassium 12/29/2021 4.8  3.5 - 5.1 mmol/L Final    Chloride 12/29/2021 111* 97 - 108 mmol/L Final    CO2 12/29/2021 25  21 - 32 mmol/L Final    Anion gap 12/29/2021 5  5 - 15 mmol/L Final    Glucose 12/29/2021 103* 65 - 100 mg/dL Final    BUN 12/29/2021 14  6 - 20 MG/DL Final    Creatinine 12/29/2021 1.07* 0.55 - 1.02 MG/DL Final    BUN/Creatinine ratio 12/29/2021 13  12 - 20   Final    GFR est AA 12/29/2021 >60  >60 ml/min/1.73m2 Final    GFR est non-AA 12/29/2021 55* >60 ml/min/1.73m2 Final    Comment: Estimated GFR is calculated using the IDMS-traceable Modification of Diet in  Renal Disease (MDRD) Study equation, reported for both  Americans  (GFRAA) and non- Americans (GFRNA), and normalized to 1.73m2 body  surface area. The physician must decide which value applies to the patient.  Calcium 12/29/2021 9.8  8.5 - 10.1 MG/DL Final    Bilirubin, total 12/29/2021 0.5  0.2 - 1.0 MG/DL Final    ALT (SGPT) 12/29/2021 13  12 - 78 U/L Final    AST (SGOT) 12/29/2021 18  15 - 37 U/L Final    Alk.  phosphatase 12/29/2021 48  45 - 117 U/L Final    Protein, total 12/29/2021 7.4  6.4 - 8.2 g/dL Final    Albumin 12/29/2021 4.0  3.5 - 5.0 g/dL Final    Globulin 12/29/2021 3.4  2.0 - 4.0 g/dL Final    A-G Ratio 12/29/2021 1.2  1.1 - 2.2   Final    Cholesterol, total 12/29/2021 156  <200 MG/DL Final    Triglyceride 12/29/2021 78  <150 MG/DL Final    Comment: Based on NCEP-ATP III:  Triglycerides <150 mg/dL  is considered normal, 150-199  mg/dL  borderline high,  200-499 mg/dL high and  greater than or equal to 500  mg/dL very high.  HDL Cholesterol 12/29/2021 48  MG/DL Final    Comment: Based on NCEP ATP III, HDL Cholesterol <40 mg/dL is considered low and >60  mg/dL is elevated.  LDL, calculated 12/29/2021 92.4  0 - 100 MG/DL Final    Comment: Based on the NCEP-ATP: LDL-C concentrations are considered  optimal <100 mg/dL,  near optimal/above Normal 100-129 mg/dL Borderline High: 130-159, High: 160-189  mg/dL Very High: Greater than or equal to 190 mg/dL      VLDL, calculated 12/29/2021 15.6  MG/DL Final    CHOL/HDL Ratio 12/29/2021 3.3  0.0 - 5.0   Final    WBC 12/29/2021 6.9  3.6 - 11.0 K/uL Final    RBC 12/29/2021 4.51  3.80 - 5.20 M/uL Final    HGB 12/29/2021 14.4  11.5 - 16.0 g/dL Final    HCT 12/29/2021 42.1  35.0 - 47.0 % Final    MCV 12/29/2021 93.3  80.0 - 99.0 FL Final    MCH 12/29/2021 31.9  26.0 - 34.0 PG Final    MCHC 12/29/2021 34.2  30.0 - 36.5 g/dL Final    RDW 12/29/2021 12.2  11.5 - 14.5 % Final    PLATELET 07/24/7623 582* 150 - 400 K/uL Final    NRBC 12/29/2021 0.0  0  WBC Final    ABSOLUTE NRBC 12/29/2021 0.00  0.00 - 0.01 K/uL Final    NEUTROPHILS 12/29/2021 66  32 - 75 % Final    LYMPHOCYTES 12/29/2021 26  12 - 49 % Final    MONOCYTES 12/29/2021 7  5 - 13 % Final    EOSINOPHILS 12/29/2021 1  0 - 7 % Final    BASOPHILS 12/29/2021 0  0 - 1 % Final    IMMATURE GRANULOCYTES 12/29/2021 0  0.0 - 0.5 % Final    ABS. NEUTROPHILS 12/29/2021 4.5  1.8 - 8.0 K/UL Final    ABS. LYMPHOCYTES 12/29/2021 1.8  0.8 - 3.5 K/UL Final    ABS. MONOCYTES 12/29/2021 0.5  0.0 - 1.0 K/UL Final    ABS.  EOSINOPHILS 12/29/2021 0.1  0.0 - 0.4 K/UL Final    ABS. BASOPHILS 12/29/2021 0.0  0.0 - 0.1 K/UL Final    ABS. IMM. GRANS. 12/29/2021 0.0  0.00 - 0.04 K/UL Final    DF 12/29/2021 SMEAR SCANNED    Final    RBC COMMENTS 12/29/2021 NORMOCYTIC, NORMOCHROMIC    Final         Radiographic Studies:  XR Results (most recent):  Results from Hospital Encounter encounter on 04/10/18    XR CHEST PORT    Narrative  INTERPRETATION PROVIDED FOR COMPLIANCE ONLY AT NO CHARGE    New bilateral breast surgery and chest wall drainage tubes. New left subclavian  port and catheter are in good position. No evidence of pneumothorax. However,  bilateral lung apices are lucent at the patient's baseline. See procedure report  for details. [facility] Results (most recent):  Results from Abstract encounter on 02/14/18    TED MAMMO BI SCREENING INCL CAD     CT Results (most recent):  No results found for this or any previous visit. DEXA Results (most recent):  No results found for this or any previous visit. MRI Results (most recent):  Results from [place] encounter on 10/26/21    MRI BREAST BI W WO CONT    Narrative  INDICATION: Right breast carcinoma, post bilateral mastectomies. Right breast  lump. COMPARISON: 2/26/2018. TECHNIQUE:  Multisequence, multiplanar, bilateral breast MRI was performed in prone position  using a dedicated breast coil. Images were obtained without contrast and dynamic  postcontrast images were obtained in multiple phases. 10 mL IV gadoterate  meglumine (Dotarem) was administered. Subtraction images were reconstructed. Postcontrast images were reviewed with dedicated kinetic analysis software. FINDINGS:  There is mild background parenchymal enhancement and heterogeneous  fibroglandular tissue. Post bilateral mastectomies and saline implant  reconstructions. The implants are intact. No suspicious enhancing foci. No  axillary or internal mammary chain lymphadenopathy. Impression  1.  No suspicious enhancement. 2. No lymphadenopathy. 3. BI-RADS Assessment Category 2: Benign finding. Assessment/Plan:       ICD-10-CM ICD-9-CM    1. Routine adult health maintenance  Z00.00 V70.0 CBC WITH AUTOMATED DIFF   2. Primary hypertension  O25 485.0 METABOLIC PANEL, COMPREHENSIVE   3. Prediabetes  R73.03 790.29 HEMOGLOBIN A1C WITH EAG      LIPID PANEL      MICROALBUMIN, UR, RAND W/ MICROALB/CREAT RATIO   4. Malignant neoplasm of upper-outer quadrant of right breast in female, estrogen receptor positive ([facility] [address]  C50.411 174.4     Z17.0 V86.0    5. Migraine without status migrainosus, not intractable, unspecified migraine type  G43.909 346.90          Healthcare Maintenance:  - Preventive measures are reviewed as per above  - Up to date on routine interventions except as noted above  - Orders placed to update gaps as noted  - Notes: UTD on routine screening including CSP. Will try to get records of her procedure. CCM. Essential Hypertension/Blood Pressure Management:   - Home BP Readings: not doing   - Current Control: optimal   - Target BP: <140/90 mmHg   - Relevant BP Meds:  Key CAD CHF Meds             propranolol LA (INDERAL LA) 160 mg capsule (Taking) Take 1 Capsule by mouth nightly. omega-3 fatty acids (FISH OIL CONCENTRATE PO) (Taking) Take  by mouth daily.           - Plan: continue current treatment regimen, continue current meds, dietary sodium restriction, regular aerobic exercise, weight loss   - Notes: ---      Migraine Headaches:   - Well controlled, asymptomatic, minimal headache burden. Nurtec effective. Continue propranolol. NO change in burden or quality. Follows with Neurology. CCM. Breast Cancer:  - S/p mastectomy and reconstruction. MRI Breast R 10/21 WNL w/o evidence of recurrence. Following regularly with breast surgeon and women's health. On Tamoxifen given ER+ status of prior malignancy. Tolerating well. Continue followup as currently scheduled. No changes to regimen today. Nora Hoffman MD    Please note that this dictation was completed with [employer], the computer voice recognition software. Quite often unanticipated grammatical, syntax, homophones, and other interpretive errors are inadvertently transcribed by the computer software. Please disregard these errors. Please excuse any errors that have escaped final proofreading. Follow-up and Dispositions    · Return in about 1 year (around 6/30/2023).

## 2022-06-30 ENCOUNTER — OFFICE VISIT (OUTPATIENT)
Dept: INTERNAL MEDICINE CLINIC | Age: 47
End: 2022-06-30
Payer: COMMERCIAL

## 2022-06-30 VITALS
RESPIRATION RATE: 16 BRPM | BODY MASS INDEX: 25.29 KG/M2 | HEART RATE: 84 BPM | OXYGEN SATURATION: 99 % | WEIGHT: 128.8 LBS | DIASTOLIC BLOOD PRESSURE: 80 MMHG | SYSTOLIC BLOOD PRESSURE: 128 MMHG | TEMPERATURE: 99.1 F | HEIGHT: 60 IN

## 2022-06-30 DIAGNOSIS — C50.411 MALIGNANT NEOPLASM OF UPPER-OUTER QUADRANT OF RIGHT BREAST IN FEMALE, ESTROGEN RECEPTOR POSITIVE (HCC): ICD-10-CM

## 2022-06-30 DIAGNOSIS — Z17.0 MALIGNANT NEOPLASM OF UPPER-OUTER QUADRANT OF RIGHT BREAST IN FEMALE, ESTROGEN RECEPTOR POSITIVE (HCC): ICD-10-CM

## 2022-06-30 DIAGNOSIS — I10 PRIMARY HYPERTENSION: ICD-10-CM

## 2022-06-30 DIAGNOSIS — Z00.00 ROUTINE ADULT HEALTH MAINTENANCE: Primary | ICD-10-CM

## 2022-06-30 DIAGNOSIS — R73.03 PREDIABETES: ICD-10-CM

## 2022-06-30 DIAGNOSIS — G43.909 MIGRAINE WITHOUT STATUS MIGRAINOSUS, NOT INTRACTABLE, UNSPECIFIED MIGRAINE TYPE: ICD-10-CM

## 2022-06-30 PROCEDURE — 99396 PREV VISIT EST AGE 40-64: CPT | Performed by: INTERNAL MEDICINE

## 2022-06-30 PROCEDURE — 99214 OFFICE O/P EST MOD 30 MIN: CPT | Performed by: INTERNAL MEDICINE

## 2022-06-30 NOTE — PROGRESS NOTES
Chief Complaint   Patient presents with   [facility] Establish Care       1. \"Have you been to the ER, urgent care clinic since your last visit? Hospitalized since your last visit? \" No    2. \"Have you seen or consulted any other health care providers outside of the [address] [place] [address] since your last visit? \" No     3. For patients aged 39-70: Has the patient had a colonoscopy / FIT/ Cologuard? No      If the patient is female:    4. For patients aged 41-77: Has the patient had a mammogram within the past 2 years? No      5. For patients aged 21-65: Has the patient had a pap smear?  No

## 2022-06-30 NOTE — PATIENT INSTRUCTIONS
Learning About the [address] [facility] Diet  What is the Mediterranean diet? The Mediterranean diet is a style of eating rather than a diet plan. It features foods eaten in [place], [place], [place] and [place], and other countries along the [facility]. It emphasizes eating foods like fish, fruits, vegetables, beans, high-fiber breads and whole grains, nuts, and olive oil. This style of eating includes limited red meat, cheese, and sweets. Why choose the Mediterranean diet? A Mediterranean-style diet may improve heart health. It contains more fat than other heart-healthy diets. But the fats are mainly from nuts, unsaturated oils (such as fish oils and olive oil), and certain nut or seed oils (such as canola, soybean, or flaxseed oil). These fats may help protect the heart and blood vessels. How can you get started on the Mediterranean diet? Here are some things you can do to switch to a more Mediterranean way of eating. What to eat  · Eat a variety of fruits and vegetables each day, such as grapes, blueberries, tomatoes, broccoli, peppers, figs, olives, spinach, eggplant, beans, lentils, and chickpeas. · Eat a variety of whole-grain foods each day, such as oats, brown rice, and whole wheat bread, pasta, and couscous. · Eat fish at least 2 times a week. Try tuna, salmon, mackerel, lake trout, herring, or sardines. · Eat moderate amounts of low-fat dairy products, such as milk, cheese, or yogurt. · Eat moderate amounts of poultry and eggs. · Choose healthy (unsaturated) fats, such as nuts, olive oil, and certain nut or seed oils like canola, soybean, and flaxseed. · Limit unhealthy (saturated) fats, such as butter, palm oil, and coconut oil. And limit fats found in animal products, such as meat and dairy products made with whole milk. Try to eat red meat only a few times a month in very small amounts. · Limit sweets and desserts to only a few times a week.  This includes sugar-sweetened drinks like soda. The Mediterranean diet may also include red wine with your meal1 glass each day for women and up to 2 glasses a day for men. Tips for eating at home  · Use herbs, spices, garlic, lemon zest, and citrus juice instead of salt to add flavor to foods. · Add avocado slices to your sandwich instead of raza. · Have fish for lunch or dinner instead of red meat. Brush the fish with olive oil, and broil or grill it. · Sprinkle your salad with seeds or nuts instead of cheese. · Cook with olive or canola oil instead of butter or oils that are high in saturated fat. · Switch from 2% milk or whole milk to 1% or fat-free milk. · Dip raw vegetables in a vinaigrette dressing or hummus instead of dips made from mayonnaise or sour cream.  · Have a piece of fruit for dessert instead of a piece of cake. Try baked apples, or have some dried fruit. Tips for eating out  · Try broiled, grilled, baked, or poached fish instead of having it fried or breaded. · Ask your  to have your meals prepared with olive oil instead of butter. · Order dishes made with marinara sauce or sauces made from olive oil. Avoid sauces made from cream or mayonnaise. · Choose whole-grain breads, whole wheat pasta and pizza crust, brown rice, beans, and lentils. · Cut back on butter or margarine on bread. Instead, you can dip your bread in a small amount of olive oil. · Ask for a side salad or grilled vegetables instead of french fries or chips. Where can you learn more? Go to http://www.cifuentes.com/  Enter O407 in the search box to learn more about \"Learning About the Mediterranean Diet. \"  Current as of: September 8, 2021               Content Version: 13.2  © 2340-0176 Healthwise, Incorporated. Care instructions adapted under license by [employer] (which disclaims liability or warranty for this information).  If you have questions about a medical condition or this instruction, always ask your healthcare professional. [address] any warranty or liability for your use of this information.

## 2022-07-22 NOTE — PROGRESS NOTES
The patient insurance is requiring a 90 day supply of her Ranolazine . Can an Rx be sent in for this? Only a 1 month supply was sent in with 1 refill.  Heather Del Rosario LPN..................7/22/2022   11:20 AM     Yumiko Dickey is a 40 y.o. female presenting for Hypertension Jerrod Booker 1. Have you been to the ER, urgent care clinic since your last visit? Hospitalized since your last visit? No 
 
2. Have you seen or consulted any other health care providers outside of the [address] [place] [address] since your last visit? Include any pap smears or colon screening. GYN annual check up 4-24-19 No flowsheet data found. Abuse Screening Questionnaire 9/22/2016 Do you ever feel afraid of your partner? Edel Cedillo Are you in a relationship with someone who physically or mentally threatens you? Edel Cedillo Is it safe for you to go home? Y  
 
 
3 most recent PHQ Screens 5/1/2019 Little interest or pleasure in doing things Not at all Feeling down, depressed, irritable, or hopeless Not at all Total Score PHQ 2 0 There are no discontinued medications.

## 2022-08-08 DIAGNOSIS — G43.009 MIGRAINE WITHOUT AURA AND WITHOUT STATUS MIGRAINOSUS, NOT INTRACTABLE: ICD-10-CM

## 2022-08-15 RX ORDER — RIMEGEPANT SULFATE 75 MG/75MG
75 TABLET, ORALLY DISINTEGRATING ORAL EVERY OTHER DAY
Qty: 16 TABLET | Refills: 1 | Status: SHIPPED | OUTPATIENT
Start: 2022-08-15

## 2022-08-31 ENCOUNTER — TELEPHONE (OUTPATIENT)
Dept: NEUROLOGY | Age: 47
End: 2022-08-31

## 2022-08-31 NOTE — TELEPHONE ENCOUNTER
Patient needs a PA for her medication NURTEC. Patient stated she would like a call from our office because she has been waiting since April.       Please contact

## 2022-09-03 NOTE — TELEPHONE ENCOUNTER
Re: [facility]    1st PA request just rcvd in aug. Located Cmm Key# [id number], submitted and awaiting update. Sent my-chart message to pt.

## 2022-12-15 NOTE — PROGRESS NOTES
HISTORY OF PRESENT ILLNESS  America Taylor is a 40 y.o. female. HPI ESTABLISHED patient here today for follow up RIGHT breast cancer. The patient is doing well and has no complaints. Completed chemotherapy and is tolerating tamoxifen. Accompanied by her . History of breast cancer-   T1 N0 IDC Grade 2 Er/Pr+ Her 2 foreign eq right breast cancer. Marian Joshua positive by [facility]  4/2018- bilateral mastectomy, RIGHT SLNB with reconstruction and port insertion  Completed chemotherapy. Completed Herceptin in 4/2019. Currently taking Tamoxifen. Followed by Dr. Nicole Montalvo  Review of Systems   All other systems reviewed and are negative. Physical Exam   Pulmonary/Chest:       Bilateral reconstructed breasts healing well  No masses or adenopathy   Nursing note and vitals reviewed.       ASSESSMENT and PLAN    ICD-10-CM ICD-9-CM    1. Malignant neoplasm of upper-outer quadrant of right female breast, unspecified estrogen receptor status (HCC) C50.411 174.4      - no evidence recurrence  - f/u in 6 months with NP Additional Notes: Patient consent was obtained to proceed with the visit and recommended plan of care after discussion of all risks and benefits, including the risks of COVID-19 exposure. Detail Level: Simple

## 2023-02-09 ENCOUNTER — DOCUMENTATION ONLY (OUTPATIENT)
Dept: NEUROLOGY | Age: 48
End: 2023-02-09

## 2023-02-09 NOTE — PROGRESS NOTES
Received fax from [address] [place] [address] for refill on Propranolol. Faxed note back stating  is no at this office any longer. Stated contact pt for new neurology office. Faxed to [phone] and received fax confirmation.

## 2023-02-20 ENCOUNTER — PATIENT MESSAGE (OUTPATIENT)
Dept: NEUROLOGY | Age: 48
End: 2023-02-20

## 2023-02-20 ENCOUNTER — TELEPHONE (OUTPATIENT)
Dept: NEUROLOGY | Age: 48
End: 2023-02-20

## 2023-02-20 DIAGNOSIS — G43.009 MIGRAINE WITHOUT AURA AND WITHOUT STATUS MIGRAINOSUS, NOT INTRACTABLE: ICD-10-CM

## 2023-02-21 RX ORDER — PROPRANOLOL HYDROCHLORIDE 160 MG/1
160 CAPSULE, EXTENDED RELEASE ORAL
Qty: 90 CAPSULE | Refills: 3 | Status: SHIPPED | OUTPATIENT
Start: 2023-02-21

## 2023-02-21 NOTE — TELEPHONE ENCOUNTER
Responding to [employer] message. Patient stated that she will need a refill on her propranolol. Originally was getting it from her PCP Dr. Ventura Madison for migraines.      LOV: 03/17/22  Last refill: 01/04/22 with 3 refills  Next follow up: 03/23/23

## 2023-03-23 ENCOUNTER — DOCUMENTATION ONLY (OUTPATIENT)
Dept: NEUROLOGY | Age: 48
End: 2023-03-23

## 2023-03-23 ENCOUNTER — OFFICE VISIT (OUTPATIENT)
Dept: NEUROLOGY | Age: 48
End: 2023-03-23
Payer: COMMERCIAL

## 2023-03-23 VITALS
HEART RATE: 84 BPM | OXYGEN SATURATION: 100 % | WEIGHT: 134 LBS | DIASTOLIC BLOOD PRESSURE: 80 MMHG | HEIGHT: 60 IN | BODY MASS INDEX: 26.31 KG/M2 | SYSTOLIC BLOOD PRESSURE: 132 MMHG

## 2023-03-23 DIAGNOSIS — G43.009 MIGRAINE WITHOUT AURA AND WITHOUT STATUS MIGRAINOSUS, NOT INTRACTABLE: Primary | ICD-10-CM

## 2023-03-23 PROCEDURE — 3079F DIAST BP 80-89 MM HG: CPT | Performed by: PSYCHIATRY & NEUROLOGY

## 2023-03-23 PROCEDURE — 3075F SYST BP GE 130 - 139MM HG: CPT | Performed by: PSYCHIATRY & NEUROLOGY

## 2023-03-23 PROCEDURE — 99214 OFFICE O/P EST MOD 30 MIN: CPT | Performed by: PSYCHIATRY & NEUROLOGY

## 2023-03-23 NOTE — PROGRESS NOTES
Given patient loading dose of Emgality 120mg/mL on her left arm and left abd. (subQ). Patient tolerated medication well. No adverse effects. Patient was given instructions on how to give to self and how to store the medication. Patient was informed of common side effects of Emgality.     LOT: [id number]  EXP: 12/12/24  MANU: Conor Mabry

## 2023-03-23 NOTE — PROGRESS NOTES
Chief Complaint   Patient presents with    Follow-up     Migraines \"still about the same\"       HISTORY OF PRESENT ILLNESS  Threeatt I Escobar Villalpando came back for follow-up. She states that her migraine frequency is about the same and she gets a headache at least 7 or to 8 days out of the month, more clustered around her menstrual cycle. Nurtec has made an overall difference. Still takes propanolol extended release 160 mg daily which has helped with palpitations. Does not use any other rescue medications. RECAP  She has migraine headaches and has had them since age 15. She still gets them, at least 10 days out of the month, mainly centered around the menstrual cycle. Duration can be few hours to several days in a row. Headache is mainly retro-orbital, sharp throbbing pain, associated with photophobia, phonophobia and nausea. No other focal motor or sensory deficits. She has not found out any other triggers. She has tried sumatriptan and rizatriptan for abortive therapy and these have not helped. Lately she has been taking Nurtec which works. She has also taken Fioricet in the past but not anymore. Taking atenolol extended release 160 mg daily which is also for blood pressure control which he continues to get at least 9-10 headache days per month despite taking this dose. Past Medical History:   Diagnosis Date    History of breast cancer 2018    T1 N0 IDC Grade 2 Er/Pr+ Her 2 foreign equivocal right breast cancer. HER2 positive by FISH    History of palpitations     Hypertension     Long term (current) use of selective estrogen receptor modulators (serms)     Migraine headache     Prediabetes     Vitamin D deficiency      Current Outpatient Medications   Medication Sig    propranolol LA (INDERAL LA) 160 mg capsule Take 1 Capsule by mouth nightly. rimegepant (Nurtec ODT) 75 mg disintegrating tablet Take 1 Tablet by mouth every other day.     omega-3 fatty acids (FISH OIL CONCENTRATE PO) Take  by mouth daily.    coenzyme q10 10 mg cap Take  by mouth. cholecalciferol, vitamin D3, 50 mcg (2,000 unit) tab Take 5,000 Units by mouth daily. b complex vitamins tablet Take 1 Tab by mouth daily. C/sourcherry/celery/grape seed (TART CHERRY PO) Take 2 Caps by mouth daily. OTHER Take 1 Cap by mouth daily. Indications: POMEGRANTE COMPLETE    mv-mn/C/glutamin/lysin/pvma500 (AIRBORNE, ASCORBATE SODIUM, PO) Take 1 Gum by mouth daily. tamoxifen (NOLVADEX) 20 mg tablet Take 20 mg by mouth nightly. multivitamin (ONE A DAY) tablet Take 1 Tab by mouth daily. No current facility-administered medications for this visit. No Known Allergies    PHYSICAL EXAMINATION:    Visit Vitals  /80 (BP 1 Location: Left upper arm, BP Patient Position: Sitting)   Pulse 84   Ht 5' (1.524 m)   Wt 134 lb (60.8 kg)   SpO2 100%   BMI 26.17 kg/m²       NEUROLOGICAL EXAMINATION:     Mental Status:   Alert and oriented to person, place, and time. Attention span and concentration are normal. Speech is fluent . Cranial Nerves:    II, III, IV, VI:  Visual acuity grossly intact. Visual fields are normal.    Pupils are equal, round, and reactive. Extra-ocular movements are full and fluid. V-XII: Hearing is grossly intact. Facial features are symmetric, with normal sensation and strength. The palate rises symmetrically and the tongue protrudes midline. Motor Examination: Normal tone, bulk, and strength. Sensory exam:  Normal throughout     Coordination:  Finger to nose and rapid arm movement testing was normal.   No resting or intention tremor    Gait and Station:  Steady. Normal arm swing. No muscle wasting or fasiculations noted.         LABS / IMAGING  Lab Results   Component Value Date/Time    WBC 6.9 12/29/2021 04:33 PM    HGB 14.4 12/29/2021 04:33 PM    HCT 42.1 12/29/2021 04:33 PM    PLATELET 977 (L) 60/33/3489 04:33 PM    MCV 93.3 12/29/2021 04:33 PM     Lab Results   Component Value Date/Time    Sodium 141 12/29/2021 04:33 PM    Potassium 4.8 12/29/2021 04:33 PM    Chloride 111 (H) 12/29/2021 04:33 PM    CO2 25 12/29/2021 04:33 PM    Anion gap 5 12/29/2021 04:33 PM    Glucose 103 (H) 12/29/2021 04:33 PM    BUN 14 12/29/2021 04:33 PM    Creatinine 1.07 (H) 12/29/2021 04:33 PM    BUN/Creatinine ratio 13 12/29/2021 04:33 PM    GFR est AA >60 12/29/2021 04:33 PM    GFR est non-AA 55 (L) 12/29/2021 04:33 PM    Calcium 9.8 12/29/2021 04:33 PM    Bilirubin, total 0.5 12/29/2021 04:33 PM    Alk. phosphatase 48 12/29/2021 04:33 PM    Protein, total 7.4 12/29/2021 04:33 PM    Albumin 4.0 12/29/2021 04:33 PM    Globulin 3.4 12/29/2021 04:33 PM    A-G Ratio 1.2 12/29/2021 04:33 PM    ALT (SGPT) 13 12/29/2021 04:33 PM    AST (SGOT) 18 12/29/2021 04:33 PM         ASSESSMENT    ICD-10-CM ICD-9-CM    1. Migraine without aura and without status migrainosus, not intractable  G43.009 346.10           DISCUSSION  Ms. Marlyn Figueroa has common migraines without aura and she also has menstrual migraines. She is currently taking Nurtec every other day for prophylaxis along with propanolol 160 mg extended release daily. Nurtec has made some difference but she still has at least 7 or 8 headache days per month. I have recommended trial of Emgality monthly injection for prophylaxis and see if it works better. A sample injection was given in the office today. She may use Nurtec as an abortive/as needed medication.     Follow-up annually or earlier if needed    Ginnie Councilman, MD  Diplomate, American Board of Psychiatry & Neurology (Neurology)  Fernanda Romero Board of Psychiatry & Neurology (Clinical Neurophysiology)  Diplomate, American Board of Electrodiagnostic Medicine

## 2023-03-23 NOTE — PROGRESS NOTES
Chief Complaint   Patient presents with    Follow-up     Migraines \"still about the same\"     Visit Vitals  /80 (BP 1 Location: Left upper arm, BP Patient Position: Sitting)   Pulse 84   Ht 5' (1.524 m)   Wt 134 lb (60.8 kg)   SpO2 100%   BMI 26.17 kg/m²

## 2023-03-28 ENCOUNTER — OFFICE VISIT (OUTPATIENT)
Dept: SURGERY | Age: 48
End: 2023-03-28
Payer: COMMERCIAL

## 2023-03-28 VITALS — HEIGHT: 60 IN | BODY MASS INDEX: 26.31 KG/M2 | WEIGHT: 134 LBS

## 2023-03-28 DIAGNOSIS — Z85.3 HISTORY OF BREAST CANCER: ICD-10-CM

## 2023-03-28 DIAGNOSIS — C50.411 MALIGNANT NEOPLASM OF UPPER-OUTER QUADRANT OF RIGHT FEMALE BREAST, UNSPECIFIED ESTROGEN RECEPTOR STATUS (HCC): Primary | ICD-10-CM

## 2023-03-28 DIAGNOSIS — Z90.13 S/P MASTECTOMY, BILATERAL: ICD-10-CM

## 2023-03-28 PROCEDURE — 99213 OFFICE O/P EST LOW 20 MIN: CPT | Performed by: NURSE PRACTITIONER

## 2023-03-28 NOTE — PROGRESS NOTES
HISTORY OF PRESENT ILLNESS  America Bruno is a 50 y.o. female. HPI ESTABLISHED patient here for follow-up of RIGHT breast cancer, S/P BILATERAL mastectomies with reconstruction. No breast complaints or breast pain. History of breast cancer-   Referring - Dr. Jian Escobar  T1 N0 IDC Grade 2 Er/Pr+ Her 2 foreign equivocal right breast cancer. HER2 positive by [facility]  4/2018- bilateral mastectomy, RIGHT SLNB with reconstruction and port insertion - Dr. Kapil Noriega with Dr. Ofe Swift. Completed chemotherapy. Completed Herceptin in 4/2019. Currently taking Tamoxifen. Followed by Dr. Tonya Crum  2021 - PE finding - RIGHT reconstructed breast - puffy nodular area at center of breast - MRI in 10/2021 normal.        Family history -   Maternal cousin diagnosed with breast cancer in her 66's. OB History    No obstetric history on file. Obstetric Comments   Menarche:  15. LMP: 2/8/18. # of Children:  1. Age at Delivery of First Child:  25.   Hysterectomy/oophorectomy:  NO/NO. Breast Bx:  yes. Hx of Breast Feeding:  no. BCP:  yes.  Hormone therapy:  no.                      Past Surgical History:   Procedure Laterality Date    HX BREAST BIOPSY Right 02/2018    HX BREAST RECONSTRUCTION Bilateral 4/10/2018    BREAST RECONSTRUCTION performed by Rajinder Colin MD at [place] [address]    HX BREAST RECONSTRUCTION Bilateral 6/18/2019    REMOVE BILATERAL BREAST TISSUE EXPANDERS, REVISION OF RECONSTRUCTED BREASTS,  PLACE BILATERAL BREAST IMPLANTS, FAT GRAFTING FROM ABDOMEN TO BILATERAL BREASTS  AND REMOVAL OF JOHAN CATH performed by Rajinder Colin MD at [place] [address]    HX BREAST RECONSTRUCTION Bilateral 6/18/2019    BREAST RECONSTRUCTION performed by Rajinder Colin MD at [place] [address]    HX MASTECTOMY Bilateral 4/10/2018    BILATERAL BREAST MASTECTOMIES, RIGHT BREAST SENTINEL NODE BIOPSY (SN 4-9 3:00), BILATERAL PRE-PEC VS SUB-PEC TISSUE EXPANDERS AND ALLODERM WITH DERMAL MATRIX, PORTACATH INSERTION performed by Gavino Bridges MD at [facility]    HX WISDOM TEETH EXTRACTION           MRI Results (most recent):  Results from Hospital Encounter encounter on 10/26/21    MRI BREAST BI W WO CONT    Narrative  INDICATION: Right breast carcinoma, post bilateral mastectomies. Right breast  lump. COMPARISON: 2/26/2018. TECHNIQUE:  Multisequence, multiplanar, bilateral breast MRI was performed in prone position  using a dedicated breast coil. Images were obtained without contrast and dynamic  postcontrast images were obtained in multiple phases. 10 mL IV gadoterate  meglumine (Dotarem) was administered. Subtraction images were reconstructed. Postcontrast images were reviewed with dedicated kinetic analysis software. FINDINGS:  There is mild background parenchymal enhancement and heterogeneous  fibroglandular tissue. Post bilateral mastectomies and saline implant  reconstructions. The implants are intact. No suspicious enhancing foci. No  axillary or internal mammary chain lymphadenopathy. Impression  1. No suspicious enhancement. 2. No lymphadenopathy. 3. BI-RADS Assessment Category 2: Benign finding. ROS    Physical Exam  Constitutional:       Appearance: Normal appearance. Chest:   Breasts:     Right: Absent. Left: Absent. Comments: Chest wall s/p BILATERAL mastectomies with reconstruction  Musculoskeletal:      Comments: FROM - UE x 2   Lymphadenopathy:      Upper Body:      Right upper body: No supraclavicular or axillary adenopathy. Left upper body: No supraclavicular or axillary adenopathy. Neurological:      Mental Status: She is alert.    Psychiatric:         Attention and Perception: Attention normal.         Mood and Affect: Mood normal.         Speech: Speech normal.         Behavior: Behavior normal.       Visit Vitals  Ht 5' (1.524 m)   Wt 134 lb (60.8 kg)   BMI 26.17 kg/m²       ASSESSMENT and PLAN    ICD-10-CM ICD-9-CM    1. Malignant neoplasm of upper-outer quadrant of right female breast, unspecified estrogen receptor status ([facility] [address]  C50.411 174.4       2. S/P mastectomy, bilateral  Z90.13 V45.71       3. History of breast cancer  Z85.3 V10.3           Normal exam with no evidence of local recurrence. Continues care with Dr. Graham Alegre. Evaluating whether to continue tamoxifen for more than 5 years. Would like to continue annual CBE here. RTC in 1 year or sooner PRN. She is comfortable with this plan. All questions answered and she stated understanding. Total time spent for this patient - 20 minutes.

## 2023-05-19 ENCOUNTER — TELEPHONE (OUTPATIENT)
Age: 48
End: 2023-05-19

## 2023-05-19 DIAGNOSIS — G43.009 MIGRAINE WITHOUT AURA, NOT INTRACTABLE, WITHOUT STATUS MIGRAINOSUS: Primary | ICD-10-CM

## 2023-05-19 RX ORDER — RIMEGEPANT SULFATE 75 MG/75MG
75 TABLET, ORALLY DISINTEGRATING ORAL EVERY OTHER DAY
Qty: 18 TABLET | Refills: 2 | Status: SHIPPED | OUTPATIENT
Start: 2023-05-19

## 2023-05-19 RX ORDER — RIMEGEPANT SULFATE 75 MG/75MG
TABLET, ORALLY DISINTEGRATING ORAL
Qty: 16 TABLET | Refills: 0 | OUTPATIENT
Start: 2023-05-19

## 2023-05-19 RX ORDER — RIMEGEPANT SULFATE 75 MG/75MG
75 TABLET, ORALLY DISINTEGRATING ORAL EVERY OTHER DAY
Qty: 30 TABLET | Refills: 2 | Status: SHIPPED | OUTPATIENT
Start: 2023-05-19 | End: 2023-05-19 | Stop reason: SDUPTHER

## 2023-05-19 NOTE — TELEPHONE ENCOUNTER
[facility], looked over chart and insurance plan is the same. PA is good through Sept 2023. Update sent to nurse in teams.

## 2023-05-19 NOTE — TELEPHONE ENCOUNTER
Pt requesting refill on Rimegepant Sulfate (NURTEC) 75 MG TBDP. Please send to [facility] on file. Pt states it is urgent as she will be leaving to go out of town tomorrow morning.

## 2023-05-19 NOTE — TELEPHONE ENCOUNTER
----- Message from Blanca Vegas sent at 5/19/2023  8:47 AM EDT -----  Regarding: Nurtec refill  Contact: [phone]  Good morning, Could you please refill my Nurtec prescription at [address] today, I will be traveling tomorrow and would like to take my medicine with me. I had a recent appointment already to get the prescription refill but the pharmacy at [facility] on [address] is stating the authorization for refill has not been completed. Please contact me on my cell if you need any additional information [phone]. Thanks in advance.     Threeatt

## 2024-01-31 RX ORDER — PROPRANOLOL HYDROCHLORIDE 160 MG/1
160 CAPSULE, EXTENDED RELEASE ORAL NIGHTLY
Qty: 90 CAPSULE | Refills: 0 | Status: SHIPPED | OUTPATIENT
Start: 2024-01-31

## 2024-02-07 ENCOUNTER — OFFICE VISIT (OUTPATIENT)
Age: 49
End: 2024-02-07
Payer: COMMERCIAL

## 2024-02-07 VITALS
HEART RATE: 84 BPM | HEIGHT: 60 IN | DIASTOLIC BLOOD PRESSURE: 84 MMHG | WEIGHT: 139 LBS | SYSTOLIC BLOOD PRESSURE: 128 MMHG | OXYGEN SATURATION: 99 % | BODY MASS INDEX: 27.29 KG/M2

## 2024-02-07 DIAGNOSIS — G43.009 MIGRAINE WITHOUT AURA, NOT INTRACTABLE, WITHOUT STATUS MIGRAINOSUS: Primary | ICD-10-CM

## 2024-02-07 PROCEDURE — 99213 OFFICE O/P EST LOW 20 MIN: CPT | Performed by: PSYCHIATRY & NEUROLOGY

## 2024-02-07 PROCEDURE — 3079F DIAST BP 80-89 MM HG: CPT | Performed by: PSYCHIATRY & NEUROLOGY

## 2024-02-07 PROCEDURE — 3074F SYST BP LT 130 MM HG: CPT | Performed by: PSYCHIATRY & NEUROLOGY

## 2024-02-07 RX ORDER — PROPRANOLOL HYDROCHLORIDE 160 MG/1
160 CAPSULE, EXTENDED RELEASE ORAL NIGHTLY
Qty: 90 CAPSULE | Refills: 3 | Status: SHIPPED | OUTPATIENT
Start: 2024-02-07

## 2024-02-07 RX ORDER — RIMEGEPANT SULFATE 75 MG/75MG
75 TABLET, ORALLY DISINTEGRATING ORAL EVERY OTHER DAY
Qty: 16 TABLET | Refills: 5 | Status: SHIPPED | OUTPATIENT
Start: 2024-02-07

## 2024-02-07 NOTE — PROGRESS NOTES
Chief Complaint   Patient presents with    Follow-up     migraines       HISTORY OF PRESENT ILLNESS  Stanford Carreno came back for follow-up.  She states that she has been doing quite well.  She may get a headache around her menstrual cycle and rarely outside of the cycle.  She is currently on Nurtec 75 mg every other day and propranolol extended release 160 mg daily.  Does not use any other rescue medications.    RECAP  She has migraine headaches and has had them since age 13.  She still gets them, at least 10 days out of the month, mainly centered around the menstrual cycle.  Duration can be few hours to several days in a row.  Headache is mainly retro-orbital, sharp throbbing pain, associated with photophobia, phonophobia and nausea.  No other focal motor or sensory deficits.  She has not found out any other triggers.   She has tried sumatriptan and rizatriptan for abortive therapy and these have not helped.  Lately she has been taking Nurtec which works.  She has also taken Fioricet in the past but not anymore.  Taking atenolol extended release 160 mg daily which is also for blood pressure control which he continues to get at least 9-10 headache days per month despite taking this dose.    Current Outpatient Medications   Medication Sig    Rimegepant Sulfate (NURTEC) 75 MG TBDP Take 75 mg by mouth every other day    propranolol (INDERAL LA) 160 MG extended release capsule Take 1 capsule by mouth nightly    Cholecalciferol 50 MCG (2000 UT) TABS Take 2.5 tablets by mouth daily    Coenzyme Q10 10 MG CAPS Take by mouth    tamoxifen (NOLVADEX) 20 MG tablet Take 1 tablet by mouth     No current facility-administered medications for this visit.     PHYSICAL EXAMINATION:    Vitals:    02/07/24 1134   BP: 128/84   Pulse: 84   SpO2: 99%       NEUROLOGICAL EXAMINATION:     Mental Status:   Alert and oriented to person, place, and time.  Attention span and concentration are normal. Speech is fluent .      Cranial Nerves:

## 2024-02-07 NOTE — PROGRESS NOTES
Chief Complaint   Patient presents with    Follow-up     migraines     Vitals:    02/07/24 1134   BP: 128/84   Pulse: 84   SpO2: 99%

## 2024-03-26 ENCOUNTER — OFFICE VISIT (OUTPATIENT)
Age: 49
End: 2024-03-26

## 2024-03-26 VITALS — WEIGHT: 138 LBS | HEIGHT: 60 IN | BODY MASS INDEX: 27.09 KG/M2

## 2024-03-26 DIAGNOSIS — Z90.13 STATUS POST BILATERAL MASTECTOMY: ICD-10-CM

## 2024-03-26 DIAGNOSIS — C50.411 MALIGNANT NEOPLASM OF UPPER-OUTER QUADRANT OF RIGHT BREAST IN FEMALE, ESTROGEN RECEPTOR POSITIVE (HCC): Primary | ICD-10-CM

## 2024-03-26 DIAGNOSIS — Z17.0 MALIGNANT NEOPLASM OF UPPER-OUTER QUADRANT OF RIGHT BREAST IN FEMALE, ESTROGEN RECEPTOR POSITIVE (HCC): Primary | ICD-10-CM

## 2024-03-26 DIAGNOSIS — Z85.3 PERSONAL HISTORY OF MALIGNANT NEOPLASM OF BREAST: ICD-10-CM

## 2024-03-26 NOTE — PROGRESS NOTES
HISTORY OF PRESENT ILLNESS  Stanford Carreno is a 49 y.o. female     HPI  ESTABLISHED patient here for follow-up of RIGHT breast cancer, S/P BILATERAL mastectomies with reconstruction.  No breast complaints or breast pain.            Breast history -   Referring - Dr. Bronson Jung  T1 N0 IDC Grade 2 Er/Pr+ Her 2 josi equivocal right breast cancer.  HER2 positive by FISH  4/2018 - BILATERAL mastectomy, RIGHT SLNB with reconstruction and port insertion - Dr. Huitron  Reconstruction with Dr. Nogueira.   T1cN0  Completed chemotherapy. Completed Herceptin in 4/2019.  Currently taking Tamoxifen. Followed by Dr. Riddle  2021 - PE finding - RIGHT reconstructed breast - puffy nodular area at center of breast - MRI in 10/2021 normal.         Family history -   Maternal cousin diagnosed with breast cancer in her 70's.        OB History    No obstetric history on file.      Obstetric Comments   Menarche:  13.   LMP: 2/8/18.  # of Children:  1.  Age at Delivery of First Child:  24.   Hysterectomy/oophorectomy:  NO/NO.  Breast Bx:  yes.  Hx of Breast Feeding:  no.  BCP:  yes. Hormone therapy:  no.                    Past Surgical History:   Procedure Laterality Date    BREAST BIOPSY Right 02/2018    BREAST RECONSTRUCTION Bilateral 6/18/2019    BREAST RECONSTRUCTION performed by Andrea Nogueira MD at [facility] OR    BREAST RECONSTRUCTION Bilateral 6/18/2019    REMOVE BILATERAL BREAST TISSUE EXPANDERS, REVISION OF RECONSTRUCTED BREASTS,  PLACE BILATERAL BREAST IMPLANTS, FAT GRAFTING FROM ABDOMEN TO BILATERAL BREASTS  AND REMOVAL OF KWAKU CATH performed by Andrea Nogueira MD at [facility] AMBUL[facility]ORY OR    BREAST RECONSTRUCTION Bilateral 4/10/2018    BREAST RECONSTRUCTION performed by Andrea Nogueira MD at [facility] AM[facility]UL[facility]ORY OR    MASTECTOMY Bilateral 4/10/2018    BILATERAL BREAST MASTECTOMIES, RIGHT BREAST SENTINEL NODE BIOPSY (SN 4-9 3:00), BILATERAL PRE-PEC VS SUB-PEC TISSUE EXPANDERS AND ALLODERM WITH DERMAL MATRIX, PORTACATH INSERTION

## 2024-07-30 DIAGNOSIS — G43.009 MIGRAINE WITHOUT AURA, NOT INTRACTABLE, WITHOUT STATUS MIGRAINOSUS: ICD-10-CM

## 2025-01-25 DIAGNOSIS — G43.009 MIGRAINE WITHOUT AURA, NOT INTRACTABLE, WITHOUT STATUS MIGRAINOSUS: ICD-10-CM

## 2025-01-27 RX ORDER — RIMEGEPANT SULFATE 75 MG/75MG
TABLET, ORALLY DISINTEGRATING ORAL
Qty: 16 TABLET | Refills: 0 | Status: ACTIVE | OUTPATIENT
Start: 2025-01-27

## 2025-01-30 DIAGNOSIS — G43.009 MIGRAINE WITHOUT AURA, NOT INTRACTABLE, WITHOUT STATUS MIGRAINOSUS: ICD-10-CM

## 2025-01-30 RX ORDER — RIMEGEPANT SULFATE 75 MG/75MG
TABLET, ORALLY DISINTEGRATING ORAL
Qty: 16 TABLET | Refills: 0 | OUTPATIENT
Start: 2025-01-30

## 2025-02-07 ENCOUNTER — OFFICE VISIT (OUTPATIENT)
Age: 50
End: 2025-02-07
Payer: COMMERCIAL

## 2025-02-07 VITALS
WEIGHT: 139 LBS | HEIGHT: 60 IN | SYSTOLIC BLOOD PRESSURE: 136 MMHG | OXYGEN SATURATION: 99 % | BODY MASS INDEX: 27.29 KG/M2 | DIASTOLIC BLOOD PRESSURE: 88 MMHG | HEART RATE: 84 BPM

## 2025-02-07 DIAGNOSIS — G43.009 MIGRAINE WITHOUT AURA, NOT INTRACTABLE, WITHOUT STATUS MIGRAINOSUS: Primary | ICD-10-CM

## 2025-02-07 PROCEDURE — 99213 OFFICE O/P EST LOW 20 MIN: CPT | Performed by: PSYCHIATRY & NEUROLOGY

## 2025-02-07 PROCEDURE — 3075F SYST BP GE 130 - 139MM HG: CPT | Performed by: PSYCHIATRY & NEUROLOGY

## 2025-02-07 PROCEDURE — 3079F DIAST BP 80-89 MM HG: CPT | Performed by: PSYCHIATRY & NEUROLOGY

## 2025-02-07 RX ORDER — PROPRANOLOL HYDROCHLORIDE 120 MG/1
120 CAPSULE, EXTENDED RELEASE ORAL NIGHTLY
Qty: 90 CAPSULE | Refills: 1 | Status: SHIPPED | OUTPATIENT
Start: 2025-02-07

## 2025-02-07 RX ORDER — RIMEGEPANT SULFATE 75 MG/75MG
75 TABLET, ORALLY DISINTEGRATING ORAL EVERY OTHER DAY
Qty: 16 TABLET | Refills: 3 | Status: ACTIVE | OUTPATIENT
Start: 2025-02-07

## 2025-02-07 NOTE — PROGRESS NOTES
Chief Complaint   Patient presents with    Neurologic Problem     Follow up migraines       HISTORY OF PRESENT ILLNESS  Stanford Carreno came back for follow-up.  She states that she has been doing quite well.  She may get a headache around her menstrual cycle and rarely outside of the cycle.  She is currently on Nurtec 75 mg every other day and propranolol extended release 160 mg daily.  Does not use any other rescue medications.    RECAP  She has migraine headaches and has had them since age 13.  She still gets them, at least 10 days out of the month, mainly centered around the menstrual cycle.  Duration can be few hours to several days in a row.  Headache is mainly retro-orbital, sharp throbbing pain, associated with photophobia, phonophobia and nausea.  No other focal motor or sensory deficits.  She has not found out any other triggers.   She has tried sumatriptan and rizatriptan for abortive therapy and these have not helped.  Lately she has been taking Nurtec which works.  She has also taken Fioricet in the past but not anymore.  Taking atenolol extended release 160 mg daily which is also for blood pressure control which he continues to get at least 9-10 headache days per month despite taking this dose.    Current Outpatient Medications   Medication Sig    propranolol (INDERAL LA) 120 MG extended release capsule Take 1 capsule by mouth nightly    rimegepant sulfate (NURTEC) 75 MG TBDP Take 75 mg by mouth every other day    Cholecalciferol 50 MCG (2000 UT) TABS Take 2.5 tablets by mouth daily    Coenzyme Q10 10 MG CAPS Take by mouth    tamoxifen (NOLVADEX) 20 MG tablet Take 1 tablet by mouth    norgestrel-ethinyl estradiol (CRYSELLE-28) 0.3-30 MG-MCG per tablet (Prior Auth: Rx Ref#:[id number]) Oral for 28 (Patient not taking: Reported on 2/7/2025)     No current facility-administered medications for this visit.     PHYSICAL EXAMINATION:    Vitals:    02/07/25 1510   BP: 136/88   Pulse: 84   SpO2: 99%

## 2025-02-28 ENCOUNTER — TELEPHONE (OUTPATIENT)
Age: 50
End: 2025-02-28

## 2025-02-28 ENCOUNTER — PATIENT MESSAGE (OUTPATIENT)
Age: 50
End: 2025-02-28

## 2025-02-28 NOTE — TELEPHONE ENCOUNTER
Received message from patient stating pharmacy gave her 8 tabs instead of her regular 16 tabs of nurtec. Saw in the [employer] that it is still approved. Called pharmacy about the approval but the system states that insurance is only willing to cover 8 instead of 16. Patient has always been on nurtec every other day. No change in insurance.

## 2025-03-07 NOTE — TELEPHONE ENCOUNTER
Pt called stating votrient was not sent to [facility]. I cannot verify in CC that it was.   Sent to [place] Sent message to pt

## 2025-03-26 ENCOUNTER — TELEPHONE (OUTPATIENT)
Facility: CLINIC | Age: 50
End: 2025-03-26

## 2025-03-26 NOTE — TELEPHONE ENCOUNTER
Patient had an appointment yesterday 3/25/25 at 4:00pm.  Per patient Dr. Cat was running behind so she had to leave and is requesting a refund.  Please advise.

## 2025-04-01 ENCOUNTER — OFFICE VISIT (OUTPATIENT)
Age: 50
End: 2025-04-01

## 2025-04-01 VITALS — BODY MASS INDEX: 27.09 KG/M2 | WEIGHT: 138 LBS | HEIGHT: 60 IN

## 2025-04-01 DIAGNOSIS — Z17.0 MALIGNANT NEOPLASM OF UPPER-OUTER QUADRANT OF RIGHT BREAST IN FEMALE, ESTROGEN RECEPTOR POSITIVE: Primary | ICD-10-CM

## 2025-04-01 DIAGNOSIS — Z90.13 STATUS POST BILATERAL MASTECTOMY: ICD-10-CM

## 2025-04-01 DIAGNOSIS — C50.411 MALIGNANT NEOPLASM OF UPPER-OUTER QUADRANT OF RIGHT BREAST IN FEMALE, ESTROGEN RECEPTOR POSITIVE: Primary | ICD-10-CM

## 2025-04-01 DIAGNOSIS — Z85.3 PERSONAL HISTORY OF MALIGNANT NEOPLASM OF BREAST: ICD-10-CM

## 2025-04-01 NOTE — PROGRESS NOTES
HISTORY OF PRESENT ILLNESS  Stanford Carreno is a 50 y.o. female     HPI  ESTABLISHED patient here for follow-up of RIGHT breast cancer, S/P BILATERAL mastectomies with reconstruction.  No breast complaints or breast pain.            Breast history -   Referring - Dr. Bronson Jung  T1 N0 IDC Grade 2 Er/Pr+ Her 2 josi equivocal right breast cancer.  HER2 positive by FISH  4/2018 - BILATERAL mastectomy, RIGHT SLNB with reconstruction and port insertion - Dr. Huitron  Reconstruction with Dr. Nogueira.   T1cN0  Completed chemotherapy. Completed Herceptin in 4/2019.  Currently taking Tamoxifen. Followed by Dr. Riddle  2021 - PE finding - RIGHT reconstructed breast - puffy nodular area at center of breast - MRI in 10/2021 normal.         Family history -   Maternal cousin diagnosed with breast cancer in her 70's.        OB History    No obstetric history on file.      Obstetric Comments   Menarche:  13.   LMP: 2/8/18.  # of Children:  1.  Age at Delivery of First Child:  24.   Hysterectomy/oophorectomy:  NO/NO.  Breast Bx:  yes.  Hx of Breast Feeding:  no.  BCP:  yes. Hormone therapy:  no.                    Past Surgical History:   Procedure Laterality Date    BREAST BIOPSY Right 02/2018    BREAST RECONSTRUCTION Bilateral 6/18/2019    BREAST RECONSTRUCTION performed by Andrea Nogueira MD at [facility]    BREAST RECONSTRUCTION Bilateral 6/18/2019    REMOVE BILATERAL BREAST TISSUE EXPANDERS, REVISION OF RECONSTRUCTED BREASTS,  PLACE BILATERAL BREAST IMPLANTS, FAT GRAFTING FROM ABDOMEN TO BILATERAL BREASTS  AND REMOVAL OF KWAKU CATH performed by Andrea Nogueira MD at [facility]    BREAST RECONSTRUCTION Bilateral 4/10/2018    BREAST RECONSTRUCTION performed by Andrea Nogueira MD at [facility]    MASTECTOMY Bilateral 4/10/2018    BILATERAL BREAST MASTECTOMIES, RIGHT BREAST SENTINEL NODE BIOPSY (SN 4-9 3:00), BILATERAL PRE-PEC VS SUB-PEC TISSUE EXPANDERS AND ALLODERM WITH DERMAL MATRIX, PORTACATH INSERTION

## 2025-04-07 RX ORDER — PROPRANOLOL HYDROCHLORIDE 160 MG/1
160 CAPSULE, EXTENDED RELEASE ORAL NIGHTLY
Qty: 90 CAPSULE | Refills: 0 | OUTPATIENT
Start: 2025-04-07

## 2025-06-26 DIAGNOSIS — G43.009 MIGRAINE WITHOUT AURA, NOT INTRACTABLE, WITHOUT STATUS MIGRAINOSUS: ICD-10-CM

## 2025-06-26 RX ORDER — RIMEGEPANT SULFATE 75 MG/75MG
TABLET, ORALLY DISINTEGRATING ORAL
Qty: 16 TABLET | Refills: 5 | Status: ACTIVE | OUTPATIENT
Start: 2025-06-26

## 2025-07-30 DIAGNOSIS — G43.009 MIGRAINE WITHOUT AURA, NOT INTRACTABLE, WITHOUT STATUS MIGRAINOSUS: ICD-10-CM

## 2025-07-30 RX ORDER — PROPRANOLOL HYDROCHLORIDE 120 MG/1
120 CAPSULE, EXTENDED RELEASE ORAL NIGHTLY
Qty: 90 CAPSULE | Refills: 1 | Status: SHIPPED | OUTPATIENT
Start: 2025-07-30

## (undated) DEVICE — 3M™ TEGADERM™ TRANSPARENT FILM DRESSING FRAME STYLE, 1628, 6 IN X 8 IN (15 CM X 20 CM), 10/CT 8CT/CASE: Brand: 3M™ TEGADERM™

## (undated) DEVICE — ROCKER SWITCH PENCIL BLADE ELECTRODE, HOLSTER: Brand: EDGE

## (undated) DEVICE — SUTURE VCRL SZ 3-0 L27IN ABSRB UD L36MM CT-1 1/2 CIR J258H

## (undated) DEVICE — Device

## (undated) DEVICE — NEEDLE HYPO 18GA L1.5IN PNK S STL HUB POLYPR SHLD REG BVL

## (undated) DEVICE — INTENDED FOR TISSUE SEPARATION, AND OTHER PROCEDURES THAT REQUIRE A SHARP SURGICAL BLADE TO PUNCTURE OR CUT.: Brand: BARD-PARKER ® CARBON RIB-BACK BLADES

## (undated) DEVICE — TOWEL SURG W17XL27IN STD BLU COT NONFENESTRATED PREWASHED

## (undated) DEVICE — INFECTION CONTROL KIT SYS

## (undated) DEVICE — Z CONVERTED USE 2271116 TUBING ASPIR 9 FT STRL ULTRA-LIMP

## (undated) DEVICE — BLADE ELECTRODE: Brand: EDGE

## (undated) DEVICE — SUTURE VCRL SZ 2-0 L27IN ABSRB UD L26MM SH 1/2 CIR J417H

## (undated) DEVICE — 3M™ TEGADERM™ TRANSPARENT FILM DRESSING FRAME STYLE, 1629, 8 IN X 12 IN (20 CM X 30 CM), 10/CT 8CT/CASE: Brand: 3M™ TEGADERM™

## (undated) DEVICE — REM POLYHESIVE ADULT PATIENT RETURN ELECTRODE: Brand: VALLEYLAB

## (undated) DEVICE — 1200 GUARD II KIT W/5MM TUBE W/O VAC TUBE: Brand: GUARDIAN

## (undated) DEVICE — SUTURE PDS II SZ 2-0 L27IN ABSRB VLT L36MM CT-1 1/2 CIR Z339H

## (undated) DEVICE — KIT TISS EXP W/ 60ML SYR 122CM TRNSF SET PIERCING DEV L25CM

## (undated) DEVICE — PACK,BASIC,SIRUS,V: Brand: MEDLINE

## (undated) DEVICE — KENDALL SCD EXPRESS SLEEVES, KNEE LENGTH, MEDIUM: Brand: KENDALL SCD

## (undated) DEVICE — BINDER ABD M/L H12IN FOR 46-62IN WHT 4 SLD PNL DSGN HOOP

## (undated) DEVICE — (D)PREP SKN CHLRAPRP APPL 26ML -- CONVERT TO ITEM 371833

## (undated) DEVICE — PAD,ABDOMINAL,5"X9",ST,LF,25/BX: Brand: MEDLINE INDUSTRIES, INC.

## (undated) DEVICE — SPONGE LAP 18X18IN STRL -- 5/PK

## (undated) DEVICE — SUTURE VCRL SZ 3-0 L27IN ABSRB UD L26MM SH 1/2 CIR J416H

## (undated) DEVICE — DERMABOND SKIN ADH 0.7ML -- DERMABOND ADVANCED 12/BX

## (undated) DEVICE — SIMPLICITY FLUFF UNDERPAD 23X36, MODERATE: Brand: SIMPLICITY

## (undated) DEVICE — APPLICATOR BNDG 1MM ADH PREMIERPRO EXOFIN

## (undated) DEVICE — SURGICAL PROCEDURE PACK BASIN MAJ SET CUST NO CAUT

## (undated) DEVICE — SLIM BODY SKIN STAPLER: Brand: APPOSE ULC

## (undated) DEVICE — Z DISCONTINUED USE 2744636  DRESSING AQUACEL 14 IN ALG W3.5XL14IN POLYUR FLM CVR W/ HYDRCOLL

## (undated) DEVICE — TUBING SUCT L9FT FOR AUTOFUSE INFLTR SYS

## (undated) DEVICE — INSULATED BLADE ELECTRODE: Brand: EDGE

## (undated) DEVICE — BRA SURG POST-OP MED 38IN --

## (undated) DEVICE — SYRINGE MED 20ML STD CLR PLAS LUERLOCK TIP N CTRL DISP

## (undated) DEVICE — GOWN,SIRUS,FABRNF,XL,20/CS: Brand: MEDLINE

## (undated) DEVICE — DRAPE,CHEST,FENES,15X10,STERIL: Brand: MEDLINE

## (undated) DEVICE — DRAPE,REIN 53X77,STERILE: Brand: MEDLINE

## (undated) DEVICE — DRSG PATCH ANTIMIC 1INX7.0MM -- CONVERT TO ITEM 356054

## (undated) DEVICE — HANDLE LT SNAP ON ULT DURABLE LENS FOR TRUMPF ALC DISPOSABLE

## (undated) DEVICE — SUT SLK 2-0SH 30IN BLK --

## (undated) DEVICE — STERILE POLYISOPRENE POWDER-FREE SURGICAL GLOVES WITH EMOLLIENT COATING: Brand: PROTEXIS

## (undated) DEVICE — SOLUTION LACTATED RINGERS INJECTION USP

## (undated) DEVICE — SMOKE EVACUATION PENCIL: Brand: VALLEYLAB

## (undated) DEVICE — STERILE POLYISOPRENE POWDER-FREE SURGICAL GLOVES: Brand: PROTEXIS

## (undated) DEVICE — DRAIN SURG 15FR L3/16IN DIA4.7MM SIL CHN RND FULL FLUT TRCR

## (undated) DEVICE — SPONGE GZ W4XL4IN COT 12 PLY TYP VII WVN C FLD DSGN

## (undated) DEVICE — BRA SURG POST-OP LG 42IN --

## (undated) DEVICE — BASIC PACK: Brand: CONVERTORS

## (undated) DEVICE — SKIN TEMPERATURE SENSOR: Brand: DEROYAL

## (undated) DEVICE — SUT PLN 5-0 18IN P3 YEL --

## (undated) DEVICE — WRAP SURG W1.31XL1.34M CARD FOR PT 165-172CM THERMOWRP

## (undated) DEVICE — Z DISCONTINUED USE 2716304 SUTURE STRATAFIX SPRL SZ 3-0 L12IN ABSRB UD FS-1 L24MM 3/8

## (undated) DEVICE — CURVED, SMALL JAW, OPEN SEALER/DIVIDER: Brand: LIGASURE

## (undated) DEVICE — APPLIER LIG CLP 115IN AUTO W SUP INTLOK 30 M TI CLP PREM

## (undated) DEVICE — SUTURE PDS II SZ 3-0 L27IN ABSRB CLR SH L26MM 1/2 CIR TAPR Z416H

## (undated) DEVICE — SUTURE ETHLN SZ 2-0 L18IN NONABSORBABLE BLK L26MM FS 3/8 664G

## (undated) DEVICE — SOLUTION IV 1000ML 0.9% SOD CHL

## (undated) DEVICE — COVER US PRB W12XL244CM FLD IORT STR TIP

## (undated) DEVICE — NEEDLE HYPO 22GA L1.5IN BLK POLYPR HUB S STL REG BVL STR

## (undated) DEVICE — NDL PRT INJ NSAF BLNT 18GX1.5 --

## (undated) DEVICE — SYR 10ML LUER LOK 1/5ML GRAD --

## (undated) DEVICE — GOWN,NON-REINFORCED,XXL: Brand: MEDLINE

## (undated) DEVICE — 3M™ TEGADERM™ TRANSPARENT FILM DRESSING FRAME STYLE, 1626, 4 IN X 4-3/4 IN (10 CM X 12 CM), 50/CT 4CT/CASE: Brand: 3M™ TEGADERM™

## (undated) DEVICE — TRAY CATH OD16FR SIL URIN M STATLOK STBL DEV SURSTP

## (undated) DEVICE — SYR 50ML LR LCK 1ML GRAD NSAF --

## (undated) DEVICE — ASTOUND STANDARD SURGICAL GOWN, XL: Brand: CONVERTORS

## (undated) DEVICE — MEDI-VAC NON-CONDUCTIVE SUCTION TUBING: Brand: CARDINAL HEALTH

## (undated) DEVICE — DRAPE XR C ARM 41X74IN LF --

## (undated) DEVICE — X-RAY SPONGES,16 PLY: Brand: DERMACEA

## (undated) DEVICE — UNDERPAD INCON STD 36X23IN --

## (undated) DEVICE — SUTURE VCRL SZ 2-0 L36IN ABSRB UD L36MM CT-1 1/2 CIR J945H

## (undated) DEVICE — NEEDLE HYPO 25GA L1.5IN BLU POLYPR HUB S STL REG BVL STR

## (undated) DEVICE — DRSG AQUACEL SURG 3.5X6IN -- CONVERT TO ITEM 369227

## (undated) DEVICE — SUTURE MCRYL SZ 4-0 L27IN ABSRB UD L19MM PS-2 1/2 CIR PRIM Y426H